# Patient Record
Sex: MALE | Race: BLACK OR AFRICAN AMERICAN
[De-identification: names, ages, dates, MRNs, and addresses within clinical notes are randomized per-mention and may not be internally consistent; named-entity substitution may affect disease eponyms.]

---

## 2018-03-05 ENCOUNTER — HOSPITAL ENCOUNTER (OUTPATIENT)
Dept: HOSPITAL 17 - NEPD | Age: 63
Setting detail: OBSERVATION
LOS: 1 days | Discharge: HOME | End: 2018-03-06
Attending: HOSPITALIST | Admitting: HOSPITALIST
Payer: OTHER GOVERNMENT

## 2018-03-05 VITALS
RESPIRATION RATE: 17 BRPM | SYSTOLIC BLOOD PRESSURE: 102 MMHG | OXYGEN SATURATION: 98 % | DIASTOLIC BLOOD PRESSURE: 82 MMHG | HEART RATE: 74 BPM

## 2018-03-05 VITALS — HEART RATE: 72 BPM | SYSTOLIC BLOOD PRESSURE: 94 MMHG | DIASTOLIC BLOOD PRESSURE: 73 MMHG

## 2018-03-05 VITALS — SYSTOLIC BLOOD PRESSURE: 90 MMHG | DIASTOLIC BLOOD PRESSURE: 61 MMHG

## 2018-03-05 VITALS
SYSTOLIC BLOOD PRESSURE: 215 MMHG | HEART RATE: 92 BPM | DIASTOLIC BLOOD PRESSURE: 110 MMHG | TEMPERATURE: 98.7 F | OXYGEN SATURATION: 96 % | RESPIRATION RATE: 18 BRPM

## 2018-03-05 VITALS
TEMPERATURE: 98.7 F | RESPIRATION RATE: 18 BRPM | SYSTOLIC BLOOD PRESSURE: 101 MMHG | OXYGEN SATURATION: 97 % | HEART RATE: 89 BPM | DIASTOLIC BLOOD PRESSURE: 64 MMHG

## 2018-03-05 VITALS — DIASTOLIC BLOOD PRESSURE: 72 MMHG | HEART RATE: 68 BPM | SYSTOLIC BLOOD PRESSURE: 113 MMHG

## 2018-03-05 VITALS
HEART RATE: 86 BPM | RESPIRATION RATE: 17 BRPM | DIASTOLIC BLOOD PRESSURE: 49 MMHG | SYSTOLIC BLOOD PRESSURE: 78 MMHG | OXYGEN SATURATION: 97 %

## 2018-03-05 VITALS
SYSTOLIC BLOOD PRESSURE: 108 MMHG | HEART RATE: 80 BPM | OXYGEN SATURATION: 99 % | DIASTOLIC BLOOD PRESSURE: 69 MMHG | RESPIRATION RATE: 16 BRPM

## 2018-03-05 DIAGNOSIS — E86.0: ICD-10-CM

## 2018-03-05 DIAGNOSIS — L84: ICD-10-CM

## 2018-03-05 DIAGNOSIS — Z86.73: ICD-10-CM

## 2018-03-05 DIAGNOSIS — E78.00: ICD-10-CM

## 2018-03-05 DIAGNOSIS — M21.612: ICD-10-CM

## 2018-03-05 DIAGNOSIS — B35.1: ICD-10-CM

## 2018-03-05 DIAGNOSIS — N40.0: ICD-10-CM

## 2018-03-05 DIAGNOSIS — E11.9: ICD-10-CM

## 2018-03-05 DIAGNOSIS — I25.10: ICD-10-CM

## 2018-03-05 DIAGNOSIS — Z95.5: ICD-10-CM

## 2018-03-05 DIAGNOSIS — Z23: ICD-10-CM

## 2018-03-05 DIAGNOSIS — I25.2: ICD-10-CM

## 2018-03-05 DIAGNOSIS — M79.671: ICD-10-CM

## 2018-03-05 DIAGNOSIS — M79.672: ICD-10-CM

## 2018-03-05 DIAGNOSIS — F10.10: ICD-10-CM

## 2018-03-05 DIAGNOSIS — I10: ICD-10-CM

## 2018-03-05 DIAGNOSIS — I95.9: Primary | ICD-10-CM

## 2018-03-05 LAB
BASOPHILS # BLD AUTO: 0.1 TH/MM3 (ref 0–0.2)
BASOPHILS NFR BLD: 0.8 % (ref 0–2)
BUN SERPL-MCNC: 24 MG/DL (ref 7–18)
CALCIUM SERPL-MCNC: 8.8 MG/DL (ref 8.5–10.1)
CHLORIDE SERPL-SCNC: 107 MEQ/L (ref 98–107)
CREAT SERPL-MCNC: 1.23 MG/DL (ref 0.6–1.3)
EOSINOPHIL # BLD: 0 TH/MM3 (ref 0–0.4)
EOSINOPHIL NFR BLD: 0.5 % (ref 0–4)
ERYTHROCYTE [DISTWIDTH] IN BLOOD BY AUTOMATED COUNT: 16.2 % (ref 11.6–17.2)
GFR SERPLBLD BASED ON 1.73 SQ M-ARVRAT: 72 ML/MIN (ref 89–?)
GLUCOSE SERPL-MCNC: 86 MG/DL (ref 74–106)
HCO3 BLD-SCNC: 29.2 MEQ/L (ref 21–32)
HCT VFR BLD CALC: 40 % (ref 39–51)
HGB BLD-MCNC: 13.1 GM/DL (ref 13–17)
LYMPHOCYTES # BLD AUTO: 2 TH/MM3 (ref 1–4.8)
LYMPHOCYTES NFR BLD AUTO: 24.1 % (ref 9–44)
MAGNESIUM SERPL-MCNC: 2.4 MG/DL (ref 1.5–2.5)
MCH RBC QN AUTO: 25 PG (ref 27–34)
MCHC RBC AUTO-ENTMCNC: 32.9 % (ref 32–36)
MCV RBC AUTO: 76.2 FL (ref 80–100)
MONOCYTE #: 0.7 TH/MM3 (ref 0–0.9)
MONOCYTES NFR BLD: 8.4 % (ref 0–8)
NEUTROPHILS # BLD AUTO: 5.5 TH/MM3 (ref 1.8–7.7)
NEUTROPHILS NFR BLD AUTO: 66.2 % (ref 16–70)
PLATELET # BLD: 232 TH/MM3 (ref 150–450)
PMV BLD AUTO: 7.8 FL (ref 7–11)
RBC # BLD AUTO: 5.24 MIL/MM3 (ref 4.5–5.9)
SODIUM SERPL-SCNC: 141 MEQ/L (ref 136–145)
WBC # BLD AUTO: 8.3 TH/MM3 (ref 4–11)

## 2018-03-05 PROCEDURE — 73630 X-RAY EXAM OF FOOT: CPT

## 2018-03-05 PROCEDURE — 80053 COMPREHEN METABOLIC PANEL: CPT

## 2018-03-05 PROCEDURE — 90686 IIV4 VACC NO PRSV 0.5 ML IM: CPT

## 2018-03-05 PROCEDURE — 90471 IMMUNIZATION ADMIN: CPT

## 2018-03-05 PROCEDURE — 99285 EMERGENCY DEPT VISIT HI MDM: CPT

## 2018-03-05 PROCEDURE — 96361 HYDRATE IV INFUSION ADD-ON: CPT

## 2018-03-05 PROCEDURE — 97162 PT EVAL MOD COMPLEX 30 MIN: CPT

## 2018-03-05 PROCEDURE — 85025 COMPLETE CBC W/AUTO DIFF WBC: CPT

## 2018-03-05 PROCEDURE — 96360 HYDRATION IV INFUSION INIT: CPT

## 2018-03-05 PROCEDURE — G0008 ADMIN INFLUENZA VIRUS VAC: HCPCS

## 2018-03-05 PROCEDURE — 80048 BASIC METABOLIC PNL TOTAL CA: CPT

## 2018-03-05 PROCEDURE — G0378 HOSPITAL OBSERVATION PER HR: HCPCS

## 2018-03-05 PROCEDURE — 96372 THER/PROPH/DIAG INJ SC/IM: CPT

## 2018-03-05 PROCEDURE — 83735 ASSAY OF MAGNESIUM: CPT

## 2018-03-05 RX ADMIN — PHENYTOIN SODIUM SCH MLS/HR: 50 INJECTION INTRAMUSCULAR; INTRAVENOUS at 20:07

## 2018-03-05 RX ADMIN — HEPARIN SODIUM SCH UNITS: 10000 INJECTION, SOLUTION INTRAVENOUS; SUBCUTANEOUS at 22:42

## 2018-03-05 RX ADMIN — Medication SCH ML: at 20:07

## 2018-03-05 RX ADMIN — STANDARDIZED SENNA CONCENTRATE AND DOCUSATE SODIUM SCH TAB: 8.6; 5 TABLET, FILM COATED ORAL at 20:07

## 2018-03-05 NOTE — PD
HPI


Chief Complaint:  Pain: Acute or Chronic


Time Seen by Provider:  13:04


Travel History


International Travel<30 days:  No


Contact w/Intl Traveler<30days:  No


Traveled to known affect area:  No





History of Present Illness


HPI


62-year-old male presents to the emergency department accompanied by his sister 

with complaint of discoloration in his toes and discoloration of his big 

toenails 5 months.  Also reports foot pain for the last 5 months.  [facility] and [facility] in regards to this complaint.  He 

said he received injections into his feet for his pain which helped.  He tried 

[facility] and was told to come to the ER.  

Denies injury.  Is ambulatory on the affected extremities.  Has a callus on his 

left foot at the joint of the first toe that he says is painful and worse when 

he wears shoes when it rubs up against the shoe.  He denies paresthesias, loss 

of sensation, decreased range of motion, decreased strength to bilateral lower 

extremities.  Denies history of neuropathy or diabetes.  Has not taken any 

medication or tried any treatments to alleviate his symptoms.  Worse with 

ambulation.  Better at rest.  Symptoms are mild in severity.  History of stroke 

4 years ago, cardiac stents, hypertension.  His blood pressure is elevated in 

triage and he denies chest pain, shortness of breath, abdominal pain, vomiting, 

change in vision, diaphoresis, syncope.  Says he has not been taking any of his 

medications for the past 5-6 months because his doctor told him to stop taking 

it.  His primary care [facility].  No known 

allergies.  Has no other medical complaints.  No other modifying factors or 

associated signs and symptoms.





PFSH


Past Medical History


Arthritis:  No


Asthma:  No


Autoimmune Disease:  No


Blood Disorders:  No


Heart Rhythm Problems:  No


Cancer:  No


Cardiac Catheterization:  Yes


Cardiovascular Problems:  Yes


High Cholesterol:  Yes


Chemotherapy:  No


Congestive Heart Failure:  No


COPD:  No


Cerebrovascular Accident:  Yes


Diabetes:  Yes


Diminished Hearing:  No


Endocrine:  No


Gastrointestinal Disorders:  Yes


GERD:  No


Genitourinary:  Yes (BPH)


Hiatal Hernia:  No


Hypertension:  Yes


Immune Disorder:  No


Kidney Stones:  No


Musculoskeletal:  No


Neurologic:  No


Psychiatric:  No


Reproductive:  No


Respiratory:  No


Myocardial Infarction:  Yes


Radiation Therapy:  No


Renal Failure:  No


Sickle Cell Disease:  No


Sleep Apnea:  No


Thyroid Disease:  No


Ulcer:  No





Past Surgical History


Abdominal Surgery:  No


AICD:  No


Cardiac Surgery:  Yes (angioplasty/stent)


Coronary Stent:  Yes


Ear Surgery:  No


Endocrine Surgery:  No


Eye Surgery:  No


Genitourinary Surgery:  No


Gynecologic Surgery:  No


Insulin Pump:  No


Joint Replacement:  No


Oral Surgery:  No


Pacemaker:  No


Thoracic Surgery:  No


Other Surgery:  Yes





Social History


Alcohol Use:  No


Tobacco Use:  No (QUIT OCTOBER 2013)


Substance Use:  No





Allergies-Medications


(Allergen,Severity, Reaction):  


Coded Allergies:  


     No Known Allergies (Verified  Allergy, Unknown, 3/5/18)


Reported Meds & Prescriptions





Reported Meds & Active Scripts


Active








Review of Systems


Except as stated in HPI:  all other systems reviewed are Neg





Physical Exam


Narrative


GENERAL: Thin, elderly, black male patient, in no acute distress


SKIN: Warm and dry.


HEAD: Atraumatic. Normocephalic. 


EYES: Pupils equal and round. No scleral icterus. No injection or drainage.


ENT: Mucosa pink and moist.  Airway patent.  


NECK: Trachea midline.  


CARDIOVASCULAR: Regular rate and rhythm.  No murmur appreciated. 


RESPIRATORY: No accessory muscle use. Clear to auscultation. Breath sounds 

equal bilaterally. 


GASTROINTESTINAL: Abdomen soft, non-tender, nondistended. Hepatic and splenic 

margins not palpable.  Bowel sounds are active 4 quadrants.  


MUSCULOSKELETAL: Bilateral feet are nonedematous, nonerythematous and without 

ecchymosis; without tenderness on palpation; toes are warm and was sensory 

intact; no cyanosis; 2+ pedal pulses; onychomycosis noted to bilateral great 

toenails. No obvious deformities. No clubbing.  No cyanosis.  No edema. 


NEUROLOGICAL: Awake and alert.  Oriented 3.  No obvious cranial nerve 

deficits.  Motor grossly within normal limits. Normal speech. 


PSYCHIATRIC: Appropriate mood and affect; insight and judgment normal.





Data


Data


Last Documented VS





Vital Signs








  Date Time  Temp Pulse Resp B/P (MAP) Pulse Ox O2 Delivery O2 Flow Rate FiO2


 


3/5/18 18:00  68  113/72 (86)    


 


3/5/18 17:00   17  97 Room Air  


 


3/5/18 12:19 98.7       








Orders





 Orders


Basic Metabolic Panel (Bmp) (3/5/18 13:47)


Complete Blood Count With Diff (3/5/18 13:47)


Iv Access Insert/Monitor (3/5/18 13:47)


Sodium Chloride 0.9% Flush (Ns Flush) (3/5/18 14:00)


Orthostatic Vital Signs (3/5/18 13:47)


Magnesium (Mg) (3/5/18 13:47)


Sodium Chlor 0.9% 1000 Ml Inj (Ns 1000 M (3/5/18 16:30)


Admit Order (Ed Use Only) (3/5/18 17:58)


Consult Podiatry (3/5/18 )





Labs





Laboratory Tests








Test


  3/5/18


14:45


 


White Blood Count 8.3 TH/MM3 


 


Red Blood Count 5.24 MIL/MM3 


 


Hemoglobin 13.1 GM/DL 


 


Hematocrit 40.0 % 


 


Mean Corpuscular Volume 76.2 FL 


 


Mean Corpuscular Hemoglobin 25.0 PG 


 


Mean Corpuscular Hemoglobin


Concent 32.9 % 


 


 


Red Cell Distribution Width 16.2 % 


 


Platelet Count 232 TH/MM3 


 


Mean Platelet Volume 7.8 FL 


 


Neutrophils (%) (Auto) 66.2 % 


 


Lymphocytes (%) (Auto) 24.1 % 


 


Monocytes (%) (Auto) 8.4 % 


 


Eosinophils (%) (Auto) 0.5 % 


 


Basophils (%) (Auto) 0.8 % 


 


Neutrophils # (Auto) 5.5 TH/MM3 


 


Lymphocytes # (Auto) 2.0 TH/MM3 


 


Monocytes # (Auto) 0.7 TH/MM3 


 


Eosinophils # (Auto) 0.0 TH/MM3 


 


Basophils # (Auto) 0.1 TH/MM3 


 


CBC Comment DIFF FINAL 


 


Differential Comment  


 


Blood Urea Nitrogen 24 MG/DL 


 


Creatinine 1.23 MG/DL 


 


Random Glucose 86 MG/DL 


 


Calcium Level 8.8 MG/DL 


 


Magnesium Level 2.4 MG/DL 


 


Sodium Level 141 MEQ/L 


 


Potassium Level 4.0 MEQ/L 


 


Chloride Level 107 MEQ/L 


 


Carbon Dioxide Level 29.2 MEQ/L 


 


Anion Gap 5 MEQ/L 


 


Estimat Glomerular Filtration


Rate 72 ML/MIN 


 











[facility]


Medical Decision Making


Medical Screen Exam Complete:  Yes


Emergency Medical Condition:  Yes


Medical Record Reviewed:  Yes


Differential Diagnosis


neuropathy, plantar fasciitis, callus, bunion, onychomycosis, orthostatic 

hypotension


Narrative Course


62-year-old male with bilateral foot pain, onychomycosis, and a callus to the 

left foot.  His blood pressure is elevated in triage and he is asymptomatic.  

On blood pressure recheck his blood pressure readings were right upper 

extremity 82/52; left upper extremity 71/43.  He reports history of 

hypertension and says he has not taken any of his medications for the past 5-6 

months.  He denies lightheadedness, dizziness, syncope.  I discussed the 

patient with Dr. Enriquez and he recommended CBC, BMP, orthostatic vital 

signs.  With orthostatic vital signs the patient blood pressure remains low and 

when he stands up reports dizziness.  This was discussed with Dr. Enriquez and 

he recommended a fluid bolus and recheck orthostatic vital signs after bolus 

and if continues to feel dizzy with standing up then to admit the patient for 

observation.  Normal saline bolus ordered.


1614: CBC unremarkable.  BMP unremarkable.  BUN 24.  Magnesium 2.4.


1757: I spoke with KINGSTON Rinaldi and the patient will be admitted for 23 

observation for hypotension.  Report given.  Consult podiatry ordered.





Physician Communication


Physician Communication


KINGSTON Rinaldi





Diagnosis





 Primary Impression:  


 Hypotension


 Qualified Codes:  I95.9 - Hypotension, unspecified


 Additional Impressions:  


 Foot pain, bilateral


 Callus of foot


 Onychomycosis of left great toe


 Onychomycosis of right great toe





Admitting Information


Admitting Physician Requests:  Observation


Referrals:  


Podiatrist











Mable Reno Mar 5, 2018 13:06

## 2018-03-05 NOTE — HHI.HP
__________________________________________________





[facility]


Service


[facility]


Primary Care Physician


[facility]


Admission Diagnosis





Hypotension, bilateral foot pain


Diagnoses:  


Travel History


International Travel<30 Days:  No


Contact w/Intl Traveler <30 Da:  No


Traveled to Known Affected Are:  No


History of Present Illness


62-year-old male with a past medical history of previous CVA, CAD, history of 

hypertension presents to the emergency department for evaluation of bilateral 

foot pain.  The patient reports that he has pain on the medial side of his left 

foot, worse near his toe and on the fifth digit of his right foot.  He states 

the pain has gotten so great that he came to the emergency department for 

further evaluation.  Upon arrival to the emergency department he was found to 

be hypertensive in triage.  The patient was previously treated for hypertension 

however he reports he has not taken any antihypertensives and 4-5 months.  In 

the emergency department he was found to be hypotensive with a blood pressure 

of 78/49.  Multiple repeated measures were in the 80s systolic.  The patient 

reports dizziness upon standing.  He denies any chest pain, shortness of breath

, nausea/vomiting, lateralizing signs/symptoms.  He reports some residual hand 

 weakness in his left hand.  No fever/chills.





Review of Systems


Except as stated in HPI:  all other systems reviewed are Neg





Past Family Social History


Past Medical History


CVA in 2014


Coronary artery disease 


History of hypertension


Past Surgical History


Stent placement in 


Reported Medications





Reported Meds & Active Scripts


Active


Allergies:  


Coded Allergies:  


     No Known Allergies (Verified  Allergy, Unknown, 3/5/18)


Family History


Mother with diabetes mellitus


Social History


Sox proximally 1.5 packs per day.  Drinks a 4 pack of 16 ounce beers daily.  

Denies illicit drugs.





Physical Exam


Vital Signs





Vital Signs








  Date Time  Temp Pulse Resp B/P (MAP) Pulse Ox O2 Delivery O2 Flow Rate FiO2


 


3/5/18 19:41  80 16 108/69 (82) 99   


 


3/5/18 18:00  68  113/72 (86)    


 


3/5/18 17:30  72  94/73 (80)    


 


3/5/18 17:00  86 17 78/49 (59) 97 Room Air  


 


3/5/18 16:00  74 17 102/82 (89) 98 Room Air  


 


3/5/18 14:45  86  90/65 (73)    





  82  88/61 (70)    





  103  80/62 (68)    


 


3/5/18 12:19 98.7 92 18 215/110 (145) 96   








Physical Exam


GENERAL:  male lying in bed


SKIN: No rashes, ecchymoses or lesions. Cool and dry.


HEAD: Atraumatic. Normocephalic. No temporal or scalp tenderness.


EYES: Pupils equal round and reactive. Extraocular motions intact. No scleral 

icterus. No injection or drainage. 


ENT: Nose without bleeding, purulent drainage or septal hematoma. Throat 

without erythema, tonsillar hypertrophy or exudate. Uvula midline. Airway 

patent.


NECK: Trachea midline. No JVD or lymphadenopathy. Supple, nontender, no 

meningeal signs.


CARDIOVASCULAR: Regular rate and rhythm without murmurs, gallops, or rubs. 


RESPIRATORY: Clear to auscultation. Breath sounds equal bilaterally. No wheezes

, rales, or rhonchi.  


GASTROINTESTINAL: Abdomen soft, non-tender, nondistended. No hepato-splenomegaly

, or palpable masses. No guarding.


MUSCULOSKELETAL: Extremities without clubbing, cyanosis, or edema. No joint 

tenderness, effusion, or edema noted. No calf tenderness. Negative Homans sign 

bilaterally.


NEUROLOGICAL: Awake and alert. Cranial nerves II through XII intact.  Motor and 

sensory grossly within normal limits. Five out of 5 muscle strength in all 

muscle groups.  Slurred speech, at baseline.


Laboratory





Laboratory Tests








Test


  3/5/18


14:45


 


White Blood Count 8.3 


 


Red Blood Count 5.24 


 


Hemoglobin 13.1 


 


Hematocrit 40.0 


 


Mean Corpuscular Volume 76.2 


 


Mean Corpuscular Hemoglobin 25.0 


 


Mean Corpuscular Hemoglobin


Concent 32.9 


 


 


Red Cell Distribution Width 16.2 


 


Platelet Count 232 


 


Mean Platelet Volume 7.8 


 


Neutrophils (%) (Auto) 66.2 


 


Lymphocytes (%) (Auto) 24.1 


 


Monocytes (%) (Auto) 8.4 


 


Eosinophils (%) (Auto) 0.5 


 


Basophils (%) (Auto) 0.8 


 


Neutrophils # (Auto) 5.5 


 


Lymphocytes # (Auto) 2.0 


 


Monocytes # (Auto) 0.7 


 


Eosinophils # (Auto) 0.0 


 


Basophils # (Auto) 0.1 


 


CBC Comment DIFF FINAL 


 


Differential Comment  


 


Blood Urea Nitrogen 24 


 


Creatinine 1.23 


 


Random Glucose 86 


 


Calcium Level 8.8 


 


Magnesium Level 2.4 


 


Sodium Level 141 


 


Potassium Level 4.0 


 


Chloride Level 107 


 


Carbon Dioxide Level 29.2 


 


Anion Gap 5 


 


Estimat Glomerular Filtration


Rate 72 


 








Result Diagram:  


3/5/18 1445                                                                    

            3/5/18 1445








Caprini VTE Risk Assessment


Caprini VTE Risk Assessment:  Mod/High Risk (score >= 2)


Caprini Risk Assessment Model











 Point Value = 1          Point Value = 2  Point Value = 3  Point Value = 5


 


Age 41-60


Minor surgery


BMI > 25 kg/m2


Swollen legs


Varicose veins


Pregnancy or postpartum


History of unexplained or recurrent


   spontaneous 


Oral contraceptives or hormone


   replacement


Sepsis (< 1 month)


Serious lung disease, including


   pneumonia (< 1 month)


Abnormal pulmonary function


Acute myocardial infarction


Congestive heart failure (< 1 month)


History of inflammatory bowel disease


Medical patient at bed rest Age 61-74


Arthroscopic surgery


Major open surgery (> 45 min)


Laparoscopic surgery (> 45 min)


Malignancy


Confined to bed (> 72 hours)


Immobilizing plaster cast


Central venous access Age >= 75


History of VTE


Family history of VTE


Factor V Leiden


Prothrombin 10401O


Lupus anticoagulant


Anticardiolipin antibodies


Elevated serum homocysteine


Heparin-induced thrombocytopenia


Other congenital or acquired


   thrombophilia Stroke (< 1 month)


Elective arthroplasty


Hip, pelvis, or leg fracture


Acute spinal cord injury (< 1 month)








Prophylaxis Regimen











   Total Risk


Factor Score Risk Level Prophylaxis Regimen


 


0-1      Low Early ambulation


 


2 Moderate Order ONE of the following:


*Sequential Compression Device (SCD)


*Heparin 5000 units SQ BID


 


3-4 Higher Order ONE of the following medications:


*Heparin 5000 units SQ TID


*Enoxaparin/Lovenox 40 mg SQ daily (WT < 150 kg, CrCl > 30 mL/min)


*Enoxaparin/Lovenox 30 mg SQ daily (WT < 150 kg, CrCl > 10-29 mL/min)


*Enoxaparin/Lovenox 30 mg SQ BID (WT < 150 kg, CrCl > 30 mL/min)


AND/OR


*Sequential Compression Device (SCD)


 


5 or more Highest Order ONE of the following medications:


*Heparin 5000 units SQ TID (Preferred with Epidurals)


*Enoxaparin/Lovenox 40 mg SQ daily (WT < 150 kg, CrCl > 30 mL/min)


*Enoxaparin/Lovenox 30 mg SQ daily (WT < 150 kg, CrCl > 10-29 mL/min)


*Enoxaparin/Lovenox 30 mg SQ BID (WT < 150 kg, CrCl > 30 mL/min)


AND


*Sequential Compression Device (SCD)











Assessment and Plan


Assessment and Plan


Assessment/plan:





1.  Symptomatic hypotension


Unclear etiology - may be secondary to alcohol abuse and dehydration


Orthostatics pending


PT consulted, appreciate recommendations


IV fluids





2.  Bilateral foot pain


X-rays pending


Podiatry consulted, appreciate recommendations





3.  CAD


Patient will follow-up with his PCP





4.  Alcohol abuse


Cessation counseling provided


Thiamine/folate/multivitamin


CIWA protocol





FEN


NS at 100 cc/hr


Heart healthy diet


Electrolytes: monitor and replete prn


Heparin











Ashley Chow MD Mar 5, 2018 21:51

## 2018-03-05 NOTE — RADRPT
EXAM DATE/TIME:  03/05/2018 21:57 

 

[facility] COMPARISON:     

No previous studies available for comparison.

 

                     

INDICATIONS :     

Right foot pain with no injury for 2 week.

                     

 

MEDICAL HISTORY :     

Hypertension.  Hypercholesterolemia.  Myocardial infarction.   CVA.   

 

SURGICAL HISTORY :     

Coronary artery stent.   

 

ENCOUNTER:     

Initial                                        

 

ACUITY:     

2 weeks      

 

PAIN SCORE:     

4/10

 

LOCATION:     

Right  foot.

 

FINDINGS:     

Three view examination of the right foot demonstrates no soft tissue swelling, dislocation, or fractu
re.   The tarsal bones appear intact.  The interphalangeal and metatarsophalangeal joints are intact.
  The calcaneus is intact.  Bony mineralization is mildly decreased.

 

CONCLUSION:     Negative exam.

 

 

 

 Santiago Vasquez MD on March 05, 2018 at 22:39           

Board Certified Radiologist.

 This report was verified electronically.

## 2018-03-06 VITALS
DIASTOLIC BLOOD PRESSURE: 73 MMHG | TEMPERATURE: 98.3 F | OXYGEN SATURATION: 98 % | RESPIRATION RATE: 20 BRPM | HEART RATE: 81 BPM | SYSTOLIC BLOOD PRESSURE: 114 MMHG

## 2018-03-06 VITALS
HEART RATE: 88 BPM | DIASTOLIC BLOOD PRESSURE: 86 MMHG | SYSTOLIC BLOOD PRESSURE: 110 MMHG | RESPIRATION RATE: 18 BRPM | OXYGEN SATURATION: 99 % | TEMPERATURE: 98.5 F

## 2018-03-06 VITALS
OXYGEN SATURATION: 99 % | HEART RATE: 74 BPM | DIASTOLIC BLOOD PRESSURE: 58 MMHG | TEMPERATURE: 98.7 F | SYSTOLIC BLOOD PRESSURE: 106 MMHG | RESPIRATION RATE: 20 BRPM

## 2018-03-06 LAB
ALBUMIN SERPL-MCNC: 3.2 GM/DL (ref 3.4–5)
ALP SERPL-CCNC: 96 U/L (ref 45–117)
ALT SERPL-CCNC: 13 U/L (ref 12–78)
AST SERPL-CCNC: 10 U/L (ref 15–37)
BASOPHILS # BLD AUTO: 0.1 TH/MM3 (ref 0–0.2)
BASOPHILS NFR BLD: 1.4 % (ref 0–2)
BILIRUB SERPL-MCNC: 0.3 MG/DL (ref 0.2–1)
BUN SERPL-MCNC: 18 MG/DL (ref 7–18)
CALCIUM SERPL-MCNC: 8.4 MG/DL (ref 8.5–10.1)
CHLORIDE SERPL-SCNC: 112 MEQ/L (ref 98–107)
CREAT SERPL-MCNC: 1.03 MG/DL (ref 0.6–1.3)
EOSINOPHIL # BLD: 0.1 TH/MM3 (ref 0–0.4)
EOSINOPHIL NFR BLD: 0.9 % (ref 0–4)
ERYTHROCYTE [DISTWIDTH] IN BLOOD BY AUTOMATED COUNT: 15.8 % (ref 11.6–17.2)
GFR SERPLBLD BASED ON 1.73 SQ M-ARVRAT: 88 ML/MIN (ref 89–?)
GLUCOSE SERPL-MCNC: 84 MG/DL (ref 74–106)
HCO3 BLD-SCNC: 26.4 MEQ/L (ref 21–32)
HCT VFR BLD CALC: 36.1 % (ref 39–51)
HGB BLD-MCNC: 11.8 GM/DL (ref 13–17)
LYMPHOCYTES # BLD AUTO: 2.6 TH/MM3 (ref 1–4.8)
LYMPHOCYTES NFR BLD AUTO: 35.1 % (ref 9–44)
MCH RBC QN AUTO: 24.6 PG (ref 27–34)
MCHC RBC AUTO-ENTMCNC: 32.7 % (ref 32–36)
MCV RBC AUTO: 75.2 FL (ref 80–100)
MONOCYTE #: 0.7 TH/MM3 (ref 0–0.9)
MONOCYTES NFR BLD: 9.5 % (ref 0–8)
NEUTROPHILS # BLD AUTO: 3.9 TH/MM3 (ref 1.8–7.7)
NEUTROPHILS NFR BLD AUTO: 53.1 % (ref 16–70)
PLATELET # BLD: 193 TH/MM3 (ref 150–450)
PMV BLD AUTO: 7.3 FL (ref 7–11)
PROT SERPL-MCNC: 6.5 GM/DL (ref 6.4–8.2)
RBC # BLD AUTO: 4.8 MIL/MM3 (ref 4.5–5.9)
SODIUM SERPL-SCNC: 143 MEQ/L (ref 136–145)
WBC # BLD AUTO: 7.3 TH/MM3 (ref 4–11)

## 2018-03-06 RX ADMIN — Medication SCH ML: at 09:22

## 2018-03-06 RX ADMIN — PHENYTOIN SODIUM SCH MLS/HR: 50 INJECTION INTRAMUSCULAR; INTRAVENOUS at 05:06

## 2018-03-06 RX ADMIN — STANDARDIZED SENNA CONCENTRATE AND DOCUSATE SODIUM SCH TAB: 8.6; 5 TABLET, FILM COATED ORAL at 09:21

## 2018-03-06 RX ADMIN — HEPARIN SODIUM SCH UNITS: 10000 INJECTION, SOLUTION INTRAVENOUS; SUBCUTANEOUS at 05:08

## 2018-03-06 NOTE — HHI.PR
Subjective


Remarks


Follow-up foot pain and hypotension.  Patient has chronic bilateral foot pain 

from left bunion and right fifth toe. Dw RN





Objective


Vitals





Vital Signs








  Date Time  Temp Pulse Resp B/P (MAP) Pulse Ox O2 Delivery O2 Flow Rate FiO2


 


3/6/18 12:08 98.7 74 20 106/58 (74) 99   


 


3/6/18 07:25 98.3 81 20 114/73 (87) 98   


 


3/6/18 03:21 98.5 88 18 110/86 (94) 99   


 


3/5/18 23:22 98.7 89 18 101/64 (76) 97   


 


3/5/18 19:41  80 16 108/69 (82) 99   


 


3/5/18 18:00  68  113/72 (86)    


 


3/5/18 17:30  72  94/73 (80)    


 


3/5/18 17:00  86 17 78/49 (59) 97 Room Air  


 


3/5/18 16:00  74 17 102/82 (89) 98 Room Air  


 


3/5/18 14:45  86  90/65 (73)    





  82  88/61 (70)    





  103  80/62 (68)    








Result Diagram:  


3/6/18 0351                                                                    

            3/6/18 0351





Imaging





Last Impressions








Foot X-Ray 3/5/18 0000 Signed





Impressions: 





 Service Date/Time:  Monday, March 5, 2018 21:57 - CONCLUSION: Negative exam.  

   





 Santiago Vasquez MD 








Objective Remarks


GENERAL:  male lying in bed


SKIN: No rashes, ecchymoses or lesions. Cool and dry.


CARDIOVASCULAR: Regular rate and rhythm without murmurs, gallops, or rubs. 


RESPIRATORY: Clear to auscultation. Breath sounds equal bilaterally. No wheezes

, rales, or rhonchi.  


GASTROINTESTINAL: Abdomen soft, non-tender, nondistended. No guarding.


MUSCULOSKELETAL: Extremities without clubbing, cyanosis, or edema. No joint 

tenderness, effusion, or edema noted. No calf tenderness. Negative Homans sign 

bilaterally.  Bunion left foot and onychomycosis right fifth toenail which is 

digging into the skin no signs of cellulitis


NEUROLOGICAL: Awake and alert. Cranial nerves II through XII intact.  Motor and 

sensory grossly within normal limits. Five out of 5 muscle strength in all 

muscle groups.  Slurred speech, at baseline.


Procedures


none





A/P


Problem List:  


(1) Hypotension


ICD Code:  I95.9 - Hypotension


Status:  Acute


Assessment and Plan


1.  Symptomatic hypotension from dehydration secondary to intermittent loose 

stools for 1 month.  This is improved after IV hydration.  Obtain stool 

studies.  PT has cleared patient for discharge


2.  Bilateral foot pain.  X-rays negative for bony injuries.  He has bunion on 

the left and onychomycosis involving the right fifth small toe.  Will cancel 

podiatry consult patient can be seen outpatient


3.  CAD.  Stable


4.  Alcohol abuse.  Counseled


Discharge Planning


Discharge patient to home if stool studies negative and no diarrhea for 24 hours

(none overnight)


Condition on discharge: Improved


Regular Diet as tolerated


Ad Alis activity


Rx written: None


Follow-up with primary care physician and podiatry





Problem Qualifiers





(1) Hypotension:  


Qualified Codes:  I95.9 - Hypotension, unspecified








Naman Ku MD Mar 6, 2018 13:47

## 2018-03-06 NOTE — HHI.DCPOC
Discharge Care Plan


Diagnosis:  


(1) Foot pain, bilateral


(2) Hypotension


Goals to Promote Your Health


* To prevent worsening of your condition and complications


* To maintain your health at the optimal level


Directions to Meet Your Goals


*** Take your medications as prescribed


*** Follow your dietary instruction


*** Follow activity as directed








*** Keep your appointments as scheduled


*** Take your immunizations and boosters as scheduled


*** If your symptoms worsen call your PCP, if no PCP go to Urgent Care Center 

or Emergency Room***


*** Smoking is Dangerous to Your Health. Avoid second hand smoke***


***Call the 24-hour hour crisis hotline for domestic abuse at 1-572.922.8577***











Suad Mary PA-C Mar 6, 2018 09:00

## 2018-05-18 ENCOUNTER — HOSPITAL ENCOUNTER (INPATIENT)
Dept: HOSPITAL 17 - NEPD | Age: 63
LOS: 22 days | Discharge: NURSING, REHAB OR LONG-TERM CARE | DRG: 854 | End: 2018-06-09
Attending: HOSPITALIST | Admitting: HOSPITALIST
Payer: OTHER GOVERNMENT

## 2018-05-18 VITALS
OXYGEN SATURATION: 99 % | SYSTOLIC BLOOD PRESSURE: 100 MMHG | DIASTOLIC BLOOD PRESSURE: 54 MMHG | HEART RATE: 98 BPM | TEMPERATURE: 98.6 F | RESPIRATION RATE: 18 BRPM

## 2018-05-18 VITALS — WEIGHT: 128.53 LBS | HEIGHT: 65 IN | BODY MASS INDEX: 21.41 KG/M2

## 2018-05-18 DIAGNOSIS — L97.529: ICD-10-CM

## 2018-05-18 DIAGNOSIS — Z95.5: ICD-10-CM

## 2018-05-18 DIAGNOSIS — I25.2: ICD-10-CM

## 2018-05-18 DIAGNOSIS — B96.5: ICD-10-CM

## 2018-05-18 DIAGNOSIS — R39.12: ICD-10-CM

## 2018-05-18 DIAGNOSIS — R31.0: ICD-10-CM

## 2018-05-18 DIAGNOSIS — E11.51: ICD-10-CM

## 2018-05-18 DIAGNOSIS — I70.202: ICD-10-CM

## 2018-05-18 DIAGNOSIS — I74.5: ICD-10-CM

## 2018-05-18 DIAGNOSIS — R47.81: ICD-10-CM

## 2018-05-18 DIAGNOSIS — I95.9: ICD-10-CM

## 2018-05-18 DIAGNOSIS — I70.291: ICD-10-CM

## 2018-05-18 DIAGNOSIS — L03.116: ICD-10-CM

## 2018-05-18 DIAGNOSIS — E11.69: ICD-10-CM

## 2018-05-18 DIAGNOSIS — E11.621: ICD-10-CM

## 2018-05-18 DIAGNOSIS — M86.60: ICD-10-CM

## 2018-05-18 DIAGNOSIS — I10: ICD-10-CM

## 2018-05-18 DIAGNOSIS — M10.9: ICD-10-CM

## 2018-05-18 DIAGNOSIS — N40.1: ICD-10-CM

## 2018-05-18 DIAGNOSIS — N32.0: ICD-10-CM

## 2018-05-18 DIAGNOSIS — E78.5: ICD-10-CM

## 2018-05-18 DIAGNOSIS — I25.10: ICD-10-CM

## 2018-05-18 DIAGNOSIS — F17.210: ICD-10-CM

## 2018-05-18 DIAGNOSIS — I69.354: ICD-10-CM

## 2018-05-18 DIAGNOSIS — A41.01: Primary | ICD-10-CM

## 2018-05-18 DIAGNOSIS — N39.0: ICD-10-CM

## 2018-05-18 DIAGNOSIS — J44.9: ICD-10-CM

## 2018-05-18 LAB
ALBUMIN SERPL-MCNC: 4.4 GM/DL (ref 3.4–5)
ALP SERPL-CCNC: 80 U/L (ref 45–117)
ALT SERPL-CCNC: 19 U/L (ref 12–78)
AST SERPL-CCNC: 32 U/L (ref 15–37)
BASOPHILS # BLD AUTO: 0.1 TH/MM3 (ref 0–0.2)
BASOPHILS NFR BLD: 0.8 % (ref 0–2)
BILIRUB SERPL-MCNC: 1 MG/DL (ref 0.2–1)
BUN SERPL-MCNC: 14 MG/DL (ref 7–18)
CALCIUM SERPL-MCNC: 9.2 MG/DL (ref 8.5–10.1)
CHLORIDE SERPL-SCNC: 105 MEQ/L (ref 98–107)
CREAT SERPL-MCNC: 1.18 MG/DL (ref 0.6–1.3)
CRP SERPL-MCNC: 2 MG/DL (ref 0–0.3)
EOSINOPHIL # BLD: 0 TH/MM3 (ref 0–0.4)
EOSINOPHIL NFR BLD: 0.2 % (ref 0–4)
ERYTHROCYTE [DISTWIDTH] IN BLOOD BY AUTOMATED COUNT: 16.5 % (ref 11.6–17.2)
GFR SERPLBLD BASED ON 1.73 SQ M-ARVRAT: 76 ML/MIN (ref 89–?)
GLUCOSE SERPL-MCNC: 80 MG/DL (ref 74–106)
HCO3 BLD-SCNC: 27.2 MEQ/L (ref 21–32)
HCT VFR BLD CALC: 42.2 % (ref 39–51)
HGB BLD-MCNC: 13.5 GM/DL (ref 13–17)
INR PPP: 1.1 RATIO
LYMPHOCYTES # BLD AUTO: 2.4 TH/MM3 (ref 1–4.8)
LYMPHOCYTES NFR BLD AUTO: 16.6 % (ref 9–44)
MCH RBC QN AUTO: 23.8 PG (ref 27–34)
MCHC RBC AUTO-ENTMCNC: 32.1 % (ref 32–36)
MCV RBC AUTO: 74.1 FL (ref 80–100)
MONOCYTE #: 1.7 TH/MM3 (ref 0–0.9)
MONOCYTES NFR BLD: 11.8 % (ref 0–8)
NEUTROPHILS # BLD AUTO: 10.3 TH/MM3 (ref 1.8–7.7)
NEUTROPHILS NFR BLD AUTO: 70.6 % (ref 16–70)
PLATELET # BLD: 204 TH/MM3 (ref 150–450)
PMV BLD AUTO: 8.4 FL (ref 7–11)
PROT SERPL-MCNC: 8 GM/DL (ref 6.4–8.2)
PROTHROMBIN TIME: 10.9 SEC (ref 9.8–11.6)
RBC # BLD AUTO: 5.7 MIL/MM3 (ref 4.5–5.9)
SODIUM SERPL-SCNC: 143 MEQ/L (ref 136–145)
WBC # BLD AUTO: 14.5 TH/MM3 (ref 4–11)

## 2018-05-18 PROCEDURE — 87493 C DIFF AMPLIFIED PROBE: CPT

## 2018-05-18 PROCEDURE — 93923 UPR/LXTR ART STDY 3+ LVLS: CPT

## 2018-05-18 PROCEDURE — 86920 COMPATIBILITY TEST SPIN: CPT

## 2018-05-18 PROCEDURE — 88307 TISSUE EXAM BY PATHOLOGIST: CPT

## 2018-05-18 PROCEDURE — 86901 BLOOD TYPING SEROLOGIC RH(D): CPT

## 2018-05-18 PROCEDURE — A9579 GAD-BASE MR CONTRAST NOS,1ML: HCPCS

## 2018-05-18 PROCEDURE — 83036 HEMOGLOBIN GLYCOSYLATED A1C: CPT

## 2018-05-18 PROCEDURE — 86403 PARTICLE AGGLUT ANTBDY SCRN: CPT

## 2018-05-18 PROCEDURE — 87077 CULTURE AEROBIC IDENTIFY: CPT

## 2018-05-18 PROCEDURE — 87641 MR-STAPH DNA AMP PROBE: CPT

## 2018-05-18 PROCEDURE — 87206 SMEAR FLUORESCENT/ACID STAI: CPT

## 2018-05-18 PROCEDURE — 88305 TISSUE EXAM BY PATHOLOGIST: CPT

## 2018-05-18 PROCEDURE — 86430 RHEUMATOID FACTOR TEST QUAL: CPT

## 2018-05-18 PROCEDURE — 80053 COMPREHEN METABOLIC PANEL: CPT

## 2018-05-18 PROCEDURE — 87015 SPECIMEN INFECT AGNT CONCNTJ: CPT

## 2018-05-18 PROCEDURE — 86140 C-REACTIVE PROTEIN: CPT

## 2018-05-18 PROCEDURE — P9045 ALBUMIN (HUMAN), 5%, 250 ML: HCPCS

## 2018-05-18 PROCEDURE — 85027 COMPLETE CBC AUTOMATED: CPT

## 2018-05-18 PROCEDURE — 87040 BLOOD CULTURE FOR BACTERIA: CPT

## 2018-05-18 PROCEDURE — 85025 COMPLETE CBC W/AUTO DIFF WBC: CPT

## 2018-05-18 PROCEDURE — 87070 CULTURE OTHR SPECIMN AEROBIC: CPT

## 2018-05-18 PROCEDURE — L3260 AMBULATORY SURGICAL BOOT EAC: HCPCS

## 2018-05-18 PROCEDURE — 36569 INSJ PICC 5 YR+ W/O IMAGING: CPT

## 2018-05-18 PROCEDURE — 85610 PROTHROMBIN TIME: CPT

## 2018-05-18 PROCEDURE — 87116 MYCOBACTERIA CULTURE: CPT

## 2018-05-18 PROCEDURE — 86900 BLOOD TYPING SEROLOGIC ABO: CPT

## 2018-05-18 PROCEDURE — 73630 X-RAY EXAM OF FOOT: CPT

## 2018-05-18 PROCEDURE — 86038 ANTINUCLEAR ANTIBODIES: CPT

## 2018-05-18 PROCEDURE — 80048 BASIC METABOLIC PNL TOTAL CA: CPT

## 2018-05-18 PROCEDURE — 76937 US GUIDE VASCULAR ACCESS: CPT

## 2018-05-18 PROCEDURE — 87086 URINE CULTURE/COLONY COUNT: CPT

## 2018-05-18 PROCEDURE — 81001 URINALYSIS AUTO W/SCOPE: CPT

## 2018-05-18 PROCEDURE — 84439 ASSAY OF FREE THYROXINE: CPT

## 2018-05-18 PROCEDURE — 75635 CT ANGIO ABDOMINAL ARTERIES: CPT

## 2018-05-18 PROCEDURE — 84550 ASSAY OF BLOOD/URIC ACID: CPT

## 2018-05-18 PROCEDURE — 93005 ELECTROCARDIOGRAM TRACING: CPT

## 2018-05-18 PROCEDURE — 83735 ASSAY OF MAGNESIUM: CPT

## 2018-05-18 PROCEDURE — 88304 TISSUE EXAM BY PATHOLOGIST: CPT

## 2018-05-18 PROCEDURE — 86850 RBC ANTIBODY SCREEN: CPT

## 2018-05-18 PROCEDURE — 87102 FUNGUS ISOLATION CULTURE: CPT

## 2018-05-18 PROCEDURE — 73720 MRI LWR EXTREMITY W/O&W/DYE: CPT

## 2018-05-18 PROCEDURE — 93306 TTE W/DOPPLER COMPLETE: CPT

## 2018-05-18 PROCEDURE — 85730 THROMBOPLASTIN TIME PARTIAL: CPT

## 2018-05-18 PROCEDURE — 83605 ASSAY OF LACTIC ACID: CPT

## 2018-05-18 PROCEDURE — 84443 ASSAY THYROID STIM HORMONE: CPT

## 2018-05-18 PROCEDURE — 85652 RBC SED RATE AUTOMATED: CPT

## 2018-05-18 PROCEDURE — 84100 ASSAY OF PHOSPHORUS: CPT

## 2018-05-18 PROCEDURE — 87186 SC STD MICRODIL/AGAR DIL: CPT

## 2018-05-18 PROCEDURE — 87147 CULTURE TYPE IMMUNOLOGIC: CPT

## 2018-05-18 PROCEDURE — 88311 DECALCIFY TISSUE: CPT

## 2018-05-18 PROCEDURE — 87205 SMEAR GRAM STAIN: CPT

## 2018-05-18 NOTE — PD
HPI


Chief Complaint:  Skin Problem


Time Seen by Provider:  21:20


Travel History


International Travel<30 days:  No


Contact w/Intl Traveler<30days:  No


Traveled to known affect area:  No





History of Present Illness


HPI


62-year-old male with history of CVA with left-sided weakness, here with his 

home health caretaker for evaluation of pain and swelling to left foot with 

purulent drainage.  The patient has had pain to the base of his left great toe 

for over a month now.  Pain significantly worsened since yesterday and today 

there is purulent drainage at the site.  [facility] 

to present to [facility] emergency department for evaluation.  Patient rates the pain 

is 15 out of 10, constant, sharp/pressure, worse with movements and palpation.  

He denies fevers or chills.  No known trauma.  He smokes about half pack of 

cigarettes daily.  He was on antihypertensives, however no longer takes any 

medications.





PFSH


Past Medical History


Arthritis:  No


Asthma:  No


Autoimmune Disease:  No


Blood Disorders:  No


Heart Rhythm Problems:  No


Cancer:  No


Cardiac Catheterization:  Yes


Cardiovascular Problems:  Yes


High Cholesterol:  Yes


Chemotherapy:  No


Chest Pain:  Yes (few months ago)


Congestive Heart Failure:  No


COPD:  No


Cerebrovascular Accident:  Yes


Diabetes:  Yes


Diminished Hearing:  No


Endocrine:  No


Gastrointestinal Disorders:  Yes


GERD:  No


Genitourinary:  Yes (BPH)


Headaches:  Yes


Hiatal Hernia:  No


Hypertension:  Yes


Immune Disorder:  No


Kidney Stones:  No


Musculoskeletal:  No


Neurologic:  No


Psychiatric:  No


Reproductive:  No


Respiratory:  No


Myocardial Infarction:  Yes


Radiation Therapy:  No


Renal Failure:  No


Seizures:  Yes (cva)


Sickle Cell Disease:  No


Sleep Apnea:  No


Thyroid Disease:  No


Ulcer:  No





Past Surgical History


Abdominal Surgery:  No


AICD:  No


Arteriovenous Shunt:  Yes (cardiac stent 1992)


Cardiac Surgery:  Yes (angioplasty/stent)


Coronary Stent:  Yes


Ear Surgery:  No


Endocrine Surgery:  No


Eye Surgery:  No


Genitourinary Surgery:  No


Gynecologic Surgery:  No


Insulin Pump:  No


Joint Replacement:  No


Oral Surgery:  No


Pacemaker:  No


Thoracic Surgery:  No


Other Surgery:  Yes





Social History


Alcohol Use:  No


Tobacco Use:  No (QUIT OCTOBER 2013)


Substance Use:  No





Allergies-Medications


(Allergen,Severity, Reaction):  


Coded Allergies:  


     No Known Allergies (Verified  Allergy, Unknown, 3/5/18)


Reported Meds & Prescriptions





Reported Meds & Active Scripts


Active


Hydrocodone-Acetaminophen 5-325 mg Tab 1 Tab PO Q6H PRN


Clindamycin (Clindamycin HCl) 150 Mg Cap 450 Mg PO Q6H 10 Days








Review of Systems


Except as stated in HPI:  all other systems reviewed are Neg





Physical Exam


Narrative


GENERAL: Well-developed, well-nourished, awake, alert, comfortable, no apparent 

distress.


SKIN: Focused skin assessment warm/dry.  Medial aspect of the base of the left 

great toe there is moderate edema with overlying warmth with moderate purulent 

drainage.  This area is significantly tender.  There is no crepitus.


HEAD: Atraumatic. Normocephalic. 


EYES: Pupils equal and round. No scleral icterus. No injection or drainage. 


ENT: Mucous membranes pink and moist.


NECK: Trachea midline. No JVD. 


CARDIOVASCULAR: Regular rate and rhythm.  1+ bilateral dorsalis pedis pulses.


RESPIRATORY: No accessory muscle use. Clear to auscultation. Breath sounds 

equal bilaterally. 


GASTROINTESTINAL: Abdomen soft, non-tender, nondistended. 


MUSCULOSKELETAL: Skin exam as above.  The rest of his joints and extremities 

are without deformity, without tenderness, with normal range of motion.


NEUROLOGICAL: Awake and alert. No obvious cranial nerve deficits.  Motor 

grossly within normal limits. Normal speech.


PSYCHIATRIC: Appropriate mood and affect; insight and judgment normal.





Data


Data


Last Documented VS





Vital Signs








  Date Time  Temp Pulse Resp B/P (MAP) Pulse Ox O2 Delivery O2 Flow Rate FiO2


 


5/19/18 01:32 98.9 88 20 170/68 (102) 99 Room Air  








Orders





 Orders


Complete Blood Count With Diff (5/18/18 21:30)


Comprehensive Metabolic Panel (5/18/18 21:30)


Prothrombin Time / Inr (Pt) (5/18/18 21:30)


Act Partial Throm Time (Ptt) (5/18/18 21:30)


Iv Access Insert/Monitor (5/18/18 21:30)


Ecg Monitoring (5/18/18 21:30)


Oximetry (5/18/18 21:30)


Sodium Chloride 0.9% Flush (Ns Flush) (5/18/18 21:30)


Westergren Sedimentation Rate (5/18/18 21:30)


C-Reactive Protein (Crp) (5/18/18 21:30)


Blood Culture (5/18/18 21:30)


Wound Culture And Gram Stain (5/18/18 21:30)


Foot, Complete ([id number]) (5/18/18 )


Clindamycin 900 Mg/Ns Premix (Cleocin 90 (5/18/18 21:30)


Morphine Inj (Morphine Inj) (5/18/18 22:00)


Sodium Chlor 0.9% 1000 Ml Inj (Ns 1000 M (5/18/18 22:00)


Mri Foot W&W/O Contrast (5/18/18 )


Morphine Inj (Morphine Inj) (5/18/18 23:45)


Gadodiamide Pf Inj (Omniscan Pf Inj) (5/18/18 18:00)





Labs





Laboratory Tests








Test


  5/18/18


21:40


 


White Blood Count 14.5 TH/MM3 


 


Red Blood Count 5.70 MIL/MM3 


 


Hemoglobin 13.5 GM/DL 


 


Hematocrit 42.2 % 


 


Mean Corpuscular Volume 74.1 FL 


 


Mean Corpuscular Hemoglobin 23.8 PG 


 


Mean Corpuscular Hemoglobin


Concent 32.1 % 


 


 


Red Cell Distribution Width 16.5 % 


 


Platelet Count 204 TH/MM3 


 


Mean Platelet Volume 8.4 FL 


 


Neutrophils (%) (Auto) 70.6 % 


 


Lymphocytes (%) (Auto) 16.6 % 


 


Monocytes (%) (Auto) 11.8 % 


 


Eosinophils (%) (Auto) 0.2 % 


 


Basophils (%) (Auto) 0.8 % 


 


Neutrophils # (Auto) 10.3 TH/MM3 


 


Lymphocytes # (Auto) 2.4 TH/MM3 


 


Monocytes # (Auto) 1.7 TH/MM3 


 


Eosinophils # (Auto) 0.0 TH/MM3 


 


Basophils # (Auto) 0.1 TH/MM3 


 


CBC Comment DIFF FINAL 


 


Differential Comment  


 


Erythrocyte Sedimentation Rate 4 mm/hr 


 


Prothrombin Time 10.9 SEC 


 


Prothromb Time International


Ratio 1.1 RATIO 


 


 


Activated Partial


Thromboplast Time 23.6 SEC 


 


 


Blood Urea Nitrogen 14 MG/DL 


 


Creatinine 1.18 MG/DL 


 


Random Glucose 80 MG/DL 


 


Total Protein 8.0 GM/DL 


 


Albumin 4.4 GM/DL 


 


Calcium Level 9.2 MG/DL 


 


Alkaline Phosphatase 80 U/L 


 


Aspartate Amino Transf


(AST/SGOT) 32 U/L 


 


 


Alanine Aminotransferase


(ALT/SGPT) 19 U/L 


 


 


Total Bilirubin 1.0 MG/DL 


 


Sodium Level 143 MEQ/L 


 


Potassium Level 4.0 MEQ/L 


 


Chloride Level 105 MEQ/L 


 


Carbon Dioxide Level 27.2 MEQ/L 


 


Anion Gap 11 MEQ/L 


 


Estimat Glomerular Filtration


Rate 76 ML/MIN 


 


 


C-Reactive Protein 2.00 MG/DL 











MDM


Medical Decision Making


Medical Screen Exam Complete:  Yes


Emergency Medical Condition:  Yes


Differential Diagnosis


Osteomyelitis, abscess, cellulitis, gouty arthritis


Narrative Course


Initial vital signs show heart rate 98, blood pressure 100/54, pulse ox 99% on 

room air, oral temperature 98.6F.





CBC: WBC 14.5, hemoglobin 13.5, hematocrit 42.2, platelets 204, neutrophils 71%.


CMP is unremarkable.





CRP is 2.


ESR is 4.





Left foot x-ray:


CONCLUSION:     


Suspected chronic deformity of the hindfoot.





Patient was made aware of all findings.  He still complaining of significant 

pain in his left forefoot medially at the base of his great toe despite 

receiving 4 mg of IV morphine.  He was also provided 600 mg of clindamycin.  He 

has significant tenderness and swelling with purulent drainage and a small open 

wound.  MRI will be performed to evaluate for possible osteomyelitis.





MRI of the left foot:


CONCLUSION:     


1. No definite evidence for osteomyelitis. No discrete abscess.


2. Unusual multifocal subchondral marrow edema throughout the left foot and 

ankle as above. The finding is nonspecific. It can be related to immobilization 

of the foot or limited use of the foot.





Patient and the patient's caretaker were made aware of all findings.  He is 

resting comfortably.  Pain is improved with morphine.  He was given a dose of 

900 mg of clindamycin here in the emergency department.  On initial 

presentation the patient has an area of tenderness and mild edema to the left 

medial foot at the base of the first toe with small amount of purulent drainage 

from a superficial/circular wound that is approximately 2 mm in diameter.  

There is no crepitus.  No fluctuance or induration.  The left foot seems well 

perfused.  The patient and the patient's caretaker report that this drainage 

started today.  He is afebrile.  His ESR is less than 4.  At this point he more 

than likely has cellulitis and less likely has osteomyelitis.  The multifocal 

subchondral marrow edema makes sense as the patient has history of CVA with 

left upper and left lower extremity weakness as well as limited mobility 

secondary to the stroke, requiring home health care.  Patient is stable for 

discharge home, the patient's caretaker feels comfortable taking care of him at 

home with strict return instructions.  I will give her the information to the on

-call podiatrist with him to follow-up with this week.  The patient can also 

follow[facility]  They 

were advised on when to return to the emergency department.  They verbalized 

understanding and agreement with plan.





Upon discharge the patient complains of worsening pain to his left foot 

particularly over the first MTP joint medially where he has the warmth and 

erythema as well as small open wound that presented initially with purulent 

drainage.  Because of ongoing pain, the patient will be admitted for further 

antibiotic therapy for left foot infection/cellulitis, rule out osteomyelitis.  

Case discussed with hospitalist Dr. Mcdonald who will admit the patient to her 

service.





Diagnosis





 Primary Impression:  


 Cellulitis of left foot


 Additional Impressions:  


 Intractable pain


 R/O Osteomyelitis





Admitting Information


Admitting Physician Requests:  Observation


Referrals:  


Ad Gracia DPM


3 days


Podiatrist





Primary Care Physician


3 days





***Additional Instructions:  


Follow-up with your primary care physician this week.


Follow-up with podiatrist Dr. Gracia or a podiatrist of your choice this week.


Take antibiotic as prescribed.


Return to the emergency department for worsening symptoms or any other concerns.


Scripts


Hydrocodone-Acetaminophen (Hydrocodone-Acetaminophen) 5-325 mg Tab


1 TAB PO Q6H Y for PAIN, #15 TAB 0 Refills


   Prov: Reece Mulligan MD         5/19/18 


Clindamycin (Clindamycin) 150 Mg Cap


450 MG PO Q6H for Infection for 10 Days, #120 CAP 0 Refills


   Prov: Reece Mulligan MD         5/19/18











Reece Mulligan MD May 18, 2018 21:35

## 2018-05-18 NOTE — RADRPT
EXAM DATE/TIME:  05/18/2018 21:46 

 

[facility] COMPARISON:     

FOOT LEFT COMPLETE ([id number]), March 05, 2018, 21:52.

 

                     

INDICATIONS :     

Pain.

                     

 

MEDICAL HISTORY :            

Hypertension. Hypercholesterolemia. Myocardial infarction. CVA.   

 

SURGICAL HISTORY :        

Coronary artery stent.

 

ENCOUNTER:     

Initial                                        

 

ACUITY:     

1 week      

 

PAIN SCORE:     

5/10

 

LOCATION:     

Left  entire foot.

 

FINDINGS:     

The sub-talar joint is not visualized. Some degree of collapse of the calcaneus may be present. The c
onfiguration of the hindfoot is unchanged from the prior exam. The mid and forefoot appear grossly no
rmal. There is a minimal hallux valgus deformity.

 

CONCLUSION:     

Suspected chronic deformity of the hindfoot.

 

 

 

 Iván Shields MD on May 18, 2018 at 22:27           

Board Certified Radiologist.

 This report was verified electronically.

## 2018-05-19 VITALS
DIASTOLIC BLOOD PRESSURE: 69 MMHG | HEART RATE: 80 BPM | SYSTOLIC BLOOD PRESSURE: 96 MMHG | OXYGEN SATURATION: 98 % | TEMPERATURE: 99.7 F | RESPIRATION RATE: 18 BRPM

## 2018-05-19 VITALS
DIASTOLIC BLOOD PRESSURE: 68 MMHG | SYSTOLIC BLOOD PRESSURE: 170 MMHG | RESPIRATION RATE: 20 BRPM | OXYGEN SATURATION: 99 % | HEART RATE: 88 BPM | TEMPERATURE: 98.9 F

## 2018-05-19 VITALS
TEMPERATURE: 99.1 F | HEART RATE: 68 BPM | DIASTOLIC BLOOD PRESSURE: 68 MMHG | RESPIRATION RATE: 18 BRPM | SYSTOLIC BLOOD PRESSURE: 116 MMHG

## 2018-05-19 VITALS
OXYGEN SATURATION: 98 % | TEMPERATURE: 98 F | RESPIRATION RATE: 16 BRPM | HEART RATE: 68 BPM | SYSTOLIC BLOOD PRESSURE: 124 MMHG | DIASTOLIC BLOOD PRESSURE: 66 MMHG

## 2018-05-19 VITALS
OXYGEN SATURATION: 95 % | DIASTOLIC BLOOD PRESSURE: 59 MMHG | TEMPERATURE: 99.4 F | SYSTOLIC BLOOD PRESSURE: 105 MMHG | RESPIRATION RATE: 18 BRPM | HEART RATE: 92 BPM

## 2018-05-19 VITALS
HEART RATE: 105 BPM | RESPIRATION RATE: 16 BRPM | TEMPERATURE: 98 F | SYSTOLIC BLOOD PRESSURE: 95 MMHG | DIASTOLIC BLOOD PRESSURE: 51 MMHG | OXYGEN SATURATION: 98 %

## 2018-05-19 LAB
CRP SERPL-MCNC: 12.1 MG/DL (ref 0–0.3)
RHEUMATOID FACT SER QL LA: NEGATIVE

## 2018-05-19 RX ADMIN — Medication SCH ML: at 20:37

## 2018-05-19 RX ADMIN — VANCOMYCIN HYDROCHLORIDE SCH MLS/HR: 1 INJECTION, SOLUTION INTRAVENOUS at 12:07

## 2018-05-19 RX ADMIN — HYDROCODONE BITARTRATE AND ACETAMINOPHEN PRN TAB: 5; 325 TABLET ORAL at 15:38

## 2018-05-19 RX ADMIN — HYDROCODONE BITARTRATE AND ACETAMINOPHEN PRN TAB: 5; 325 TABLET ORAL at 21:58

## 2018-05-19 RX ADMIN — STANDARDIZED SENNA CONCENTRATE AND DOCUSATE SODIUM SCH TAB: 8.6; 5 TABLET, FILM COATED ORAL at 20:36

## 2018-05-19 RX ADMIN — Medication SCH ML: at 12:06

## 2018-05-19 RX ADMIN — STANDARDIZED SENNA CONCENTRATE AND DOCUSATE SODIUM SCH TAB: 8.6; 5 TABLET, FILM COATED ORAL at 09:00

## 2018-05-19 NOTE — HHI.HP
__________________________________________________





[facility]


Service


[facility]


Primary Care Physician


[facility]


Admission Diagnosis





Left foot cellulitis, intractable foot pain, R/O osteomyelitis


Diagnoses:  


(1) Cellulitis of left foot


Diagnosis:  Principal





(2) Intractable pain


Diagnosis:  Principal





Travel History


International Travel<30 Days:  No


Contact w/Intl Traveler <30 Da:  No


Traveled to Known Affected Are:  No


History of Present Illness


This is a 62-year-old male with a PMH of HTN, Hyperlipidemia, BPH and h/o CVA w

/ Left-Sided Weakness who was brought to the ER for left foot pain and 

swelling.  Notes ongoing swelling and tenderness to left great toe for approx 

1mo, however symptoms progressively worse since yesterday.  Now w/ foul-

smelling drainage.  Pain is constant, severe, 8/10, non-radiating, worse w/ 

ambulation.  [facility].  Denies 

fever or chills.  On arrival, /54, HR 98, O2 sat 99% RA, Afebrile.  WBC 

14.5.  Chemistry unremarkable.  CRP 2.0.  INR 1.1.  Foot X-ray suspected 

chronic deformity of hindfoot.  MRI Foot with no definitive evidence of 

osteomyelitis however unusual multifocal marrow edema throughout left foot and 

ankle, nonspecific.  S/p Clinda in ER in addition to multiple doses of pain 

medication w/ minimal improvement.





Review of Systems


Except as stated in HPI:  all other systems reviewed are Neg


ROS: 14 point review of systems otherwise negative.





Past Family Social History


Past Medical History


PMH:  HTN, Hyperlipidemia, BPH and h/o CVA w/ Left-Sided Weakness


Past Surgical History


PAST SURGICAL HISTORY: Cardiac Stent


Allergies:  


Coded Allergies:  


     No Known Allergies (Verified  Allergy, Unknown, 3/5/18)


Family History


PAST FAMILY HISTORY:  Reviewed.  No h/o DM or CAD


Social History


PAST SOCIAL HISTORY: Negative for alcohol, tobacco or drugs.





Physical Exam


Vital Signs





Vital Signs








  Date Time  Temp Pulse Resp B/P (MAP) Pulse Ox O2 Delivery O2 Flow Rate FiO2


 


18 02:42        


 


18 01:32 98.9 88 20 170/68 (102) 99 Room Air  


 


18 18:16 98.6 98 18 100/54 (69) 99   








Physical Exam


PE:


GENERAL: Very pleasant middle-aged black male in no acute distress.


HEENT: PERRLA, EOMI. No scleral icterus or conjunctival pallor. No lid lag or 

facial droop.  


CARDIOVASCULAR: Regular rate and rhythm.  No obvious murmurs to auscultation. 

No chest tenderness to palpation. 


RESPIRATORY: No obvious rhonchi or wheezing. Clear to auscultation. Breath 

sounds equal bilaterally. 


GASTROINTESTINAL: Abdomen soft, non-tender, nondistended. BS normal. 


MUSCULOSKELETAL: Extremities without clubbing, cyanosis, or edema. No obvious 

deformities. Left foot w/ +edema/warmth, +purulent drainage, +tenderness to 

palpation.


NEUROLOGICAL: Awake, alert and oriented x4. No focal neurologic deficits. 

Moving both upper and lower extremities spontaneously.


Laboratory





Laboratory Tests








Test


  18


21:40


 


White Blood Count 14.5 


 


Red Blood Count 5.70 


 


Hemoglobin 13.5 


 


Hematocrit 42.2 


 


Mean Corpuscular Volume 74.1 


 


Mean Corpuscular Hemoglobin 23.8 


 


Mean Corpuscular Hemoglobin


Concent 32.1 


 


 


Red Cell Distribution Width 16.5 


 


Platelet Count 204 


 


Mean Platelet Volume 8.4 


 


Neutrophils (%) (Auto) 70.6 


 


Lymphocytes (%) (Auto) 16.6 


 


Monocytes (%) (Auto) 11.8 


 


Eosinophils (%) (Auto) 0.2 


 


Basophils (%) (Auto) 0.8 


 


Neutrophils # (Auto) 10.3 


 


Lymphocytes # (Auto) 2.4 


 


Monocytes # (Auto) 1.7 


 


Eosinophils # (Auto) 0.0 


 


Basophils # (Auto) 0.1 


 


CBC Comment DIFF FINAL 


 


Differential Comment  


 


Erythrocyte Sedimentation Rate 4 


 


Prothrombin Time 10.9 


 


Prothromb Time International


Ratio 1.1 


 


 


Activated Partial


Thromboplast Time 23.6 


 


 


Blood Urea Nitrogen 14 


 


Creatinine 1.18 


 


Random Glucose 80 


 


Total Protein 8.0 


 


Albumin 4.4 


 


Calcium Level 9.2 


 


Alkaline Phosphatase 80 


 


Aspartate Amino Transf


(AST/SGOT) 32 


 


 


Alanine Aminotransferase


(ALT/SGPT) 19 


 


 


Total Bilirubin 1.0 


 


Sodium Level 143 


 


Potassium Level 4.0 


 


Chloride Level 105 


 


Carbon Dioxide Level 27.2 


 


Anion Gap 11 


 


Estimat Glomerular Filtration


Rate 76 


 


 


C-Reactive Protein 2.00 














 Date/Time


Source Procedure


Growth Status


 


 


 18 21:40


Blood Peripheral Aerobic Blood Culture


Pending Received


 


 18 21:40


Blood Peripheral Anaerobic Blood Culture


Pending Received





 18 21:30


Wound Foot Gram Stain


Pending Received


 


 18 21:30


Wound Foot Wound Culture


Pending Received








Result Diagram:  


18








Caprini VTE Risk Assessment


Caprini VTE Risk Assessment:  No/Low Risk (score <= 1)


Caprini Risk Assessment Model











 Point Value = 1          Point Value = 2  Point Value = 3  Point Value = 5


 


Age 41-60


Minor surgery


BMI > 25 kg/m2


Swollen legs


Varicose veins


Pregnancy or postpartum


History of unexplained or recurrent


   spontaneous 


Oral contraceptives or hormone


   replacement


Sepsis (< 1 month)


Serious lung disease, including


   pneumonia (< 1 month)


Abnormal pulmonary function


Acute myocardial infarction


Congestive heart failure (< 1 month)


History of inflammatory bowel disease


Medical patient at bed rest Age 61-74


Arthroscopic surgery


Major open surgery (> 45 min)


Laparoscopic surgery (> 45 min)


Malignancy


Confined to bed (> 72 hours)


Immobilizing plaster cast


Central venous access Age >= 75


History of VTE


Family history of VTE


Factor V Leiden


Prothrombin 49151V


Lupus anticoagulant


Anticardiolipin antibodies


Elevated serum homocysteine


Heparin-induced thrombocytopenia


Other congenital or acquired


   thrombophilia Stroke (< 1 month)


Elective arthroplasty


Hip, pelvis, or leg fracture


Acute spinal cord injury (< 1 month)








Prophylaxis Regimen











   Total Risk


Factor Score Risk Level Prophylaxis Regimen


 


0-1      Low Early ambulation


 


2 Moderate Order ONE of the following:


*Sequential Compression Device (SCD)


*Heparin 5000 units SQ BID


 


3-4 Higher Order ONE of the following medications:


*Heparin 5000 units SQ TID


*Enoxaparin/Lovenox 40 mg SQ daily (WT < 150 kg, CrCl > 30 mL/min)


*Enoxaparin/Lovenox 30 mg SQ daily (WT < 150 kg, CrCl > 10-29 mL/min)


*Enoxaparin/Lovenox 30 mg SQ BID (WT < 150 kg, CrCl > 30 mL/min)


AND/OR


*Sequential Compression Device (SCD)


 


5 or more Highest Order ONE of the following medications:


*Heparin 5000 units SQ TID (Preferred with Epidurals)


*Enoxaparin/Lovenox 40 mg SQ daily (WT < 150 kg, CrCl > 30 mL/min)


*Enoxaparin/Lovenox 30 mg SQ daily (WT < 150 kg, CrCl > 10-29 mL/min)


*Enoxaparin/Lovenox 30 mg SQ BID (WT < 150 kg, CrCl > 30 mL/min)


AND


*Sequential Compression Device (SCD)











Assessment and Plan


Problem List:  


(1) Cellulitis of left foot


ICD Code:  L03.116 - Cellulitis of left lower limb


Status:  Acute


(2) Intractable pain


ICD Code:  R52 - Pain, unspecified


Status:  Acute


Assessment and Plan


A/P:


1.  Left Foot Cellulitis:  x1 month, X-ray w/ suspected chronic deformity 

hindfoot, MRI Foot w/ no definite osteomyelitis, however unusual marrow edema 

throughout left foot and ankle, images reviewed by me.  Consult Podiatry for 

further evaluation, possible intervention.  S/p Clinda in ER, will continue w/ 

IV Abx. 


2.  Intractable Pain:  secondary to above, continue analgesics/antiemetics as 

needed. 


3.  DVT Prophylaxis:  Mechanical contraindication secondary to wound/cellulitis


4.  Social work for d/c planning as needed.  


5.  Case discussed w/ ER physician at length, labs/records/imaging reviewed by 

me.











Macie Mcdonald MD May 19, 2018 03:11

## 2018-05-19 NOTE — MB
cc:

Ad Gracia DPM

****

 

 

DATE:

05/19/2018

 

REASON FOR CONSULTATION:

Left first MPJ pain, cellulitis, rule out osteomyelitis.

 

HISTORY OF PRESENT ILLNESS:

This is a 62-year-old male who has a history of increasing pain for 

the greater part of 1 month of the left great toe.  The pain is 

constant; however, it is decreased over the last 24 hours since he has

been in the hospital.  The patient's caregiver serves as a historian. 

Apparently, they were worked up for gout, cannot remember if there is 

any blood work, but that the pain has only been worsening up until 

just recently.

 

PAST MEDICAL HISTORY:

Positive for left-sided weakness, cardiac stenting, BPH, and CVA.

 

ALLERGIES:

NO KNOWN DRUG ALLERGIES.

 

INPATIENT MEDICATIONS:

Reviewed.  The patient is receiving Clindamycin.

 

PHYSICAL EXAMINATION:

VITAL SIGNS:  Temperature 98, pulse rate 68, respiratory rate 16, 

blood pressure 124/66.  He is sating 98% on room air.

GENERAL:  This is an alert and oriented gentleman.  It appears that he

has somewhat slurred speech, but per caregiver, this is baseline.

EXTREMITIES:  The patient has mild contractures of the left foot and 

ankle.  There appears to be significant pain.  Mild induration of skin

and a shallow scabbed superficial ulcer of the medial aspect of the 

first MPJ.  Upon trying to touch this area, it is significantly 

tender.  Upon palpating the midfoot, hindfoot and ankle, there is no 

obvious focal tenderness, erythema, inflammation or ulceration.  

Pulses are hard to palpate, but they are audible via Doppler.  

Sensation appears to be intact to light touch and deep pressure.  

There is limited range of motion of the hindfoot and ankle.  Right 

lower extremity, there are no obvious open lesions of concern.

 

LABORATORY STUDIES:

WBC 14.  Hemoglobin and hematocrit, 13 and 42.  Platelet count 204.  

Chem-7:  Sodium 143, potassium 4.0, chloride 105, CO2 of 27.2.  BUN is

14.  Random glucose is 80, creatinine 1.18.  Coagulation profile:  PT 

10.9, INR 1.1.   Microbial findings:  Wound culture appears to show 

gram-positive cocci in pairs and clusters.  Blood culture ordered and 

pending.

 

IMAGING FINDINGS:

Foot x-ray appears to be relatively within normal limits, without any 

obvious bony erosive process; however, there is obliteration of the 

subtalar joint, possible some kind of hindfoot fusion or coalition of 

some kind.  MRI is more impressive.  It shows significant edema, more 

multifocal within the distal tibia, base of the first metatarsal; 

metatarsal heads 2, 3, and 4.  At the level of concern clinically, the

first MPJ there is a very small pinhole ulcer that may communicate 

with the joint capsule, but at this level there is no obvious 

significant enhancement to show signs of a septic first MPJ or bony 

edema of the first MPJ.  It is more a finding consistent with 

arthritis.

 

ASSESSMENT AND PLAN:

Left first metatarsophalangeal joint cellulitis, superficial wound.  

My recommendation is routine blood work, rule out gout and other 

rheumatoid type process.  Continue IV antibiotics.  Consider switching

to vancomycin.  We will discuss with medicine.  A segmental arterial 

Doppler ordered.  I do not anticipate the patient will need any 

debridement; however, early capsulitis or capsular infection or early 

septic first MPJ cannot be excluded at this point.  I will continue to

follow the patient.  I will see him within the next day or so to 

monitor his clinical progress and advise.

 

 

__________________________________

JENIFFER Lopez/CRYSTAL

D: 05/19/2018, 09:58 AM

T: 05/19/2018, 10:56 AM

Visit #: [id number]

Job #: [id number]

SHAAN

## 2018-05-19 NOTE — RADRPT
EXAM DATE/TIME:  05/19/2018 00:00 

 

[facility] COMPARISON:     

No previous studies available for comparison.

        

 

INDICATIONS :     

Left foot cellulitis

 

 

TECHNIQUE:    

Five-station segmental examination of the lower extremities was performed.

Pulsed-cuff waveform tracings and pressures were recorded.  Ankle-brachial indices and

toe-brachial indices were calculated.

 

PRESSURES (mmHg):     

 

Brachial (arm):         

Right  IV SITE     Left   71

 

Lower Thigh:                       

Right  60     Left   74

 

Calf:             

Right  32     Left   63

 

Ankle:     

Right  40     Left   54

 

Toe:      

Right  0     Left   0

 

JUVENAL:     

Right  0.56     Left   0.76

 

TBI:     

Right  0.00     Left   0.00

 

PULSED CUFF WAVEFORMS:     

Severely reduced amplitude throughout the lower extremities, more pronounced on the right.

 

CONCLUSION:     

1. Findings consistent with severe right and moderate left lower extremity peripheral arterial disejimbo Sanon MD on May 19, 2018 at 12:39           

Board Certified Radiologist.

 This report was verified electronically.

## 2018-05-19 NOTE — HHI.PR
Subjective


Remarks


Follow up for left foot wound/infection. The patient reports continue pain 

throughout the entire left foot, worse at the wound on the medial first MTP 

joint. Denies fevers/chills. He reports some white purulent drainage from the 

wound yesterday but none today. He reports some associated swelling and warmth 

of the left foot. Denies any other medical complaints.





Objective


Vitals





Vital Signs








  Date Time  Temp Pulse Resp B/P (MAP) Pulse Ox O2 Delivery O2 Flow Rate FiO2


 


5/19/18 08:30 98.0 68 16 124/66 (85) 98   


 


5/19/18 03:14 99.1 68 18 116/68 (84)    


 


5/19/18 02:42        


 


5/19/18 01:32 98.9 88 20 170/68 (102) 99 Room Air  


 


5/18/18 18:16 98.6 98 18 100/54 (69) 99   








Result Diagram:  


5/18/18 2140 5/18/18 2140





Imaging





Last Impressions








Foot X-Ray 5/18/18 0000 Signed





Impressions: 





 Service Date/Time:  Friday, May 18, 2018 21:46 - CONCLUSION:  Suspected 

chronic 





 deformity of the hindfoot.     Iván Shields MD 


 


Foot MRI 5/18/18 0000 Signed





Impressions: 





 Service Date/Time:  Saturday, May 19, 2018 00:12 - CONCLUSION:  1. No definite 





 evidence for osteomyelitis. No discrete abscess. 2. Unusual multifocal 





 subchondral marrow edema throughout the left foot and ankle as above. The 





 finding is nonspecific. It can be related to immobilization of the foot or 





 limited use of the foot.     Sumit Arteaga MD 








Objective Remarks


GENERAL: Well-nourished, well-developed male patient in NAD.


SKIN: Warm and dry. No rash.


HEENT:  Normocephalic. Atraumatic. Pupils equal and round.  Mucous membranes 

pink and moist.


CARDIOVASCULAR: Regular rate and rhythm.  No murmur appreciated. 


RESPIRATORY: No accessory muscle use. Clear to auscultation. Breath sounds 

equal bilaterally.  


GASTROINTESTINAL: Abdomen soft, non-tender, nondistended. Normoactive bowel 

sounds x4.


MUSCULOSKELETAL: Superficial 1.5cm ulcer at left first medial MTP with 

induration and scabbed centrally. Left foot diffusely tender to touch with mild 

diffuse warmth and edema. 


NEUROLOGICAL: Awake and alert. No obvious cranial nerve deficits.  Motor 

grossly within normal limits. Moving all extremities spontaneously. Normal 

speech.


PSYCHIATRIC: Appropriate mood and affect; insight and judgment normal.


Medications and IVs





Current Medications








 Medications


  (Trade)  Dose


 Ordered  Sig/Antony


 Route  Start Time


 Stop Time Status Last Admin


 


  (NS Flush)  2 ml  UNSCH  PRN


 IV FLUSH  5/19/18 02:00


     


 


 


  (NS Flush)  2 ml  BID


 IV FLUSH  5/19/18 09:00


    5/19/18 12:06


 


 


  (Reglan Inj)  5 mg  Q6H  PRN


 IV PUSH  5/19/18 02:00


     


 


 


  (Tylenol)  650 mg  Q6H  PRN


 PO  5/19/18 02:00


     


 


 


  (Norco  5-325 Mg)  1 tab  Q4H  PRN


 PO  5/19/18 02:00


     


 


 


  (Morphine Inj)  2 mg  Q3H  PRN


 IV PUSH  5/19/18 02:15


    5/19/18 12:07


 


 


  (Yeimi-Colace)  1 tab  BID


 PO  5/19/18 09:00


     


 


 


  (Milk Of


 Magnesia Liq)  30 ml  Q12H  PRN


 PO  5/19/18 02:00


     


 


 


  (Senokot)  17.2 mg  Q12H  PRN


 PO  5/19/18 02:00


     


 


 


  (Dulcolax Supp)  10 mg  DAILY  PRN


 RECTAL  5/19/18 02:00


     


 


 


  (Lactulose Liq)  30 ml  DAILY  PRN


 PO  5/19/18 02:00


     


 


 


 Pharmacy Profile


 Note  0 ml @ 0


 mls/hr  UNSCH


 OTHER  5/19/18 10:45


     


 


 


 Vancomycin HCl


 1000 mg/Sodium


 Chloride  250 ml @ 


 250 mls/hr  Q24H


 IV  5/19/18 12:00


    5/19/18 12:07


 


 


  ([facility]


 Ordered Lab Info)  SPECIFIC


 LAB TO BE


 ...  ONCE  ONCE


 .XX  5/22/18 11:45


 5/22/18 11:46   


 











A/P


Problem List:  


(1) Cellulitis of left foot


ICD Code:  L03.116 - Cellulitis of left lower limb


Status:  Acute


(2) Intractable pain


ICD Code:  R52 - Pain, unspecified


Status:  Acute


Assessment and Plan


62-year-old male with a PMH of HTN, HLD, BPH and h/o CVA w/ Left-Sided Weakness 

who was brought to the ER for left foot pain and swelling. 





Sepsis with Left Foot Cellulitis: Concern for septic joint vs osteomyelitis. 

Symptoms x1 month with worsening wound at left medial MTP and diffuse pain/

warmth/edema. WBC 14.5K, tachycardic, with suspected source-cellulitis vs 

septic joint. 


   -X-ray reviewed, shows suspected chronic deformity hindfoot


   -MRI Foot reviewed, no definite osteomyelitis, however unusual marrow edema 

throughout left foot and ankle


   -Blood cultures collect with NGTD


   -Wound culture collected and pending


   -Consult Podiatry for further evaluation, possible intervention.  


   -Discussed with Dr. Gracia, recommends changing antibiotics from IV Clinda 

to IV Vanco (pharmacy consulted)


   -Check arterial doppler, uric acid, RF, TOMY screen


   -Admit to inpatient, will continue to monitor





Intractable Pain:  secondary to above


   -continue analgesics/antiemetics as needed. 





All other medical conditions stable, continue home meds as appropriate. 





DVT Prophylaxis:  Mechanical contraindication secondary to wound/cellulitis. 

Chemoprophylaxis on hold with possible upcoming procedure











Suad Mary PA-C May 19, 2018 09:13

## 2018-05-19 NOTE — RADRPT
EXAM DATE/TIME:  05/19/2018 00:12 

 

[facility] COMPARISON:     

No previous studies available for comparison.

       

 

 

INDICATIONS :     

Osteomyelitis. Left medial great toe wound by MTPJ.

                     

 

CONTRAST:     

10 cc Omniscan (gadodiamide) IV

                     

 

MEDICAL HISTORY :     

Myocardial infarction. Stroke Hypertension. 

 

SURGICAL HISTORY :     

Coronary artery stent.     

 

ENCOUNTER:     

Initial

 

ACUITY:     

2 day

 

PAIN SCORE:     

10/10

 

LOCATION:     

Left   great toe

 

TECHNIQUE:     

Multiplanar, multisequence MRI examination was performed without contrast and after the intravenous a
dministration of gadolinium.

 

FINDINGS:     

There is multifocal subchondral bone marrow edema in the ankle and left foot. This includes in the di
stal tibia and fibula and to a lesser extent in the talar dome. There is also focal involvement of th
e proximal first metatarsal and minimally in the proximal second metatarsal. There is also multifocal
 edema involving the phalanges in the subchondral regions and in the second, third, fourth and fifth 
metatarsal heads. 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

 

 Postcontrast images reveal some very minimal contrast enhancement in these areas of edema. No focal 
intense contrast-enhancement is identified at the first MCP. No definite evidence for osteomyelitis.

 

CONCLUSION:     

1. No definite evidence for osteomyelitis. No discrete abscess.

2. Unusual multifocal subchondral marrow edema throughout the left foot and ankle as above. The findi
ng is nonspecific. It can be related to immobilization of the foot or limited use of the foot.

 

 

 

 Sumit Arteaga MD on May 19, 2018 at 0:52           

Board Certified Radiologist.

 This report was verified electronically.

## 2018-05-20 VITALS
HEART RATE: 94 BPM | DIASTOLIC BLOOD PRESSURE: 52 MMHG | TEMPERATURE: 98.7 F | OXYGEN SATURATION: 97 % | SYSTOLIC BLOOD PRESSURE: 83 MMHG | RESPIRATION RATE: 20 BRPM

## 2018-05-20 VITALS
HEART RATE: 67 BPM | RESPIRATION RATE: 20 BRPM | SYSTOLIC BLOOD PRESSURE: 113 MMHG | DIASTOLIC BLOOD PRESSURE: 64 MMHG | TEMPERATURE: 98.6 F | OXYGEN SATURATION: 98 %

## 2018-05-20 VITALS
OXYGEN SATURATION: 97 % | DIASTOLIC BLOOD PRESSURE: 67 MMHG | TEMPERATURE: 100.5 F | HEART RATE: 75 BPM | SYSTOLIC BLOOD PRESSURE: 108 MMHG | RESPIRATION RATE: 20 BRPM

## 2018-05-20 VITALS
RESPIRATION RATE: 19 BRPM | TEMPERATURE: 99.6 F | DIASTOLIC BLOOD PRESSURE: 67 MMHG | HEART RATE: 88 BPM | OXYGEN SATURATION: 97 % | SYSTOLIC BLOOD PRESSURE: 111 MMHG

## 2018-05-20 VITALS
SYSTOLIC BLOOD PRESSURE: 97 MMHG | TEMPERATURE: 100 F | HEART RATE: 81 BPM | RESPIRATION RATE: 17 BRPM | OXYGEN SATURATION: 98 % | DIASTOLIC BLOOD PRESSURE: 68 MMHG

## 2018-05-20 VITALS
OXYGEN SATURATION: 97 % | HEART RATE: 93 BPM | RESPIRATION RATE: 19 BRPM | TEMPERATURE: 99.3 F | SYSTOLIC BLOOD PRESSURE: 109 MMHG | DIASTOLIC BLOOD PRESSURE: 68 MMHG

## 2018-05-20 VITALS — SYSTOLIC BLOOD PRESSURE: 108 MMHG | DIASTOLIC BLOOD PRESSURE: 67 MMHG

## 2018-05-20 LAB
ALBUMIN SERPL-MCNC: 3.6 GM/DL (ref 3.4–5)
ALP SERPL-CCNC: 65 U/L (ref 45–117)
ALT SERPL-CCNC: 15 U/L (ref 12–78)
AST SERPL-CCNC: 19 U/L (ref 15–37)
BASOPHILS # BLD AUTO: 0.1 TH/MM3 (ref 0–0.2)
BASOPHILS NFR BLD: 0.4 % (ref 0–2)
BILIRUB SERPL-MCNC: 1.1 MG/DL (ref 0.2–1)
BUN SERPL-MCNC: 16 MG/DL (ref 7–18)
CALCIUM SERPL-MCNC: 8.5 MG/DL (ref 8.5–10.1)
CHLORIDE SERPL-SCNC: 105 MEQ/L (ref 98–107)
CREAT SERPL-MCNC: 1.08 MG/DL (ref 0.6–1.3)
EOSINOPHIL # BLD: 0 TH/MM3 (ref 0–0.4)
EOSINOPHIL NFR BLD: 0.2 % (ref 0–4)
ERYTHROCYTE [DISTWIDTH] IN BLOOD BY AUTOMATED COUNT: 16.7 % (ref 11.6–17.2)
GFR SERPLBLD BASED ON 1.73 SQ M-ARVRAT: 84 ML/MIN (ref 89–?)
GLUCOSE SERPL-MCNC: 87 MG/DL (ref 74–106)
HCO3 BLD-SCNC: 25.5 MEQ/L (ref 21–32)
HCT VFR BLD CALC: 35.5 % (ref 39–51)
HGB BLD-MCNC: 11.7 GM/DL (ref 13–17)
LYMPHOCYTES # BLD AUTO: 2.4 TH/MM3 (ref 1–4.8)
LYMPHOCYTES NFR BLD AUTO: 15 % (ref 9–44)
MCH RBC QN AUTO: 24.4 PG (ref 27–34)
MCHC RBC AUTO-ENTMCNC: 33 % (ref 32–36)
MCV RBC AUTO: 73.8 FL (ref 80–100)
MONOCYTE #: 1.8 TH/MM3 (ref 0–0.9)
MONOCYTES NFR BLD: 11.2 % (ref 0–8)
NEUTROPHILS # BLD AUTO: 11.7 TH/MM3 (ref 1.8–7.7)
NEUTROPHILS NFR BLD AUTO: 73.2 % (ref 16–70)
PLATELET # BLD: 162 TH/MM3 (ref 150–450)
PMV BLD AUTO: 8.2 FL (ref 7–11)
PROT SERPL-MCNC: 7.1 GM/DL (ref 6.4–8.2)
RBC # BLD AUTO: 4.8 MIL/MM3 (ref 4.5–5.9)
SODIUM SERPL-SCNC: 140 MEQ/L (ref 136–145)
WBC # BLD AUTO: 16 TH/MM3 (ref 4–11)

## 2018-05-20 RX ADMIN — CEFAZOLIN SODIUM IN SODIUM CHLORIDE 0.9% IV SOLN 2 GM/100ML SCH MLS/HR: 2-0.9/1 SOLUTION at 22:23

## 2018-05-20 RX ADMIN — HYDROCODONE BITARTRATE AND ACETAMINOPHEN PRN TAB: 5; 325 TABLET ORAL at 04:52

## 2018-05-20 RX ADMIN — Medication SCH ML: at 08:38

## 2018-05-20 RX ADMIN — STANDARDIZED SENNA CONCENTRATE AND DOCUSATE SODIUM SCH TAB: 8.6; 5 TABLET, FILM COATED ORAL at 08:38

## 2018-05-20 RX ADMIN — VANCOMYCIN HYDROCHLORIDE SCH MLS/HR: 1 INJECTION, SOLUTION INTRAVENOUS at 11:48

## 2018-05-20 RX ADMIN — STANDARDIZED SENNA CONCENTRATE AND DOCUSATE SODIUM SCH TAB: 8.6; 5 TABLET, FILM COATED ORAL at 21:00

## 2018-05-20 RX ADMIN — HYDROCODONE BITARTRATE AND ACETAMINOPHEN PRN TAB: 5; 325 TABLET ORAL at 10:55

## 2018-05-20 RX ADMIN — Medication SCH ML: at 22:22

## 2018-05-20 NOTE — HHI.PR
Subjective


Remarks


This is a 62-year-old male with a PMH of HTN, Hyperlipidemia, BPH and h/o CVA w

/ Left-Sided Weakness who was brought to the ER for left foot pain and 

swelling.  Notes ongoing swelling and tenderness to left great toe for approx 

1mo, however symptoms progressively worse since yesterday.  Now w/ foul-

smelling drainage.  Pain is constant, severe, 8/10, non-radiating, worse w/ 

ambulation.  [facility].  Denies 

fever or chills.  On arrival, /54, HR 98, O2 sat 99% RA, Afebrile.  WBC 

14.5.  Chemistry unremarkable.  CRP 2.0.  INR 1.1.  Foot X-ray suspected 

chronic deformity of hindfoot.  MRI Foot with no definitive evidence of 

osteomyelitis however unusual multifocal marrow edema throughout left foot and 

ankle, nonspecific.  S/p Clinda in ER in addition to multiple doses of pain 

medication w/ minimal improvement.





5-19 Follow up for left foot wound/infection. The patient reports continue pain 

throughout the entire left foot, worse at the wound on the medial first MTP 

joint. Denies fevers/chills. He reports some white purulent drainage from the 

wound yesterday but none today. He reports some associated swelling and warmth 

of the left foot. Denies any other medical complaints.





5-20 SEEN BY PODIATRY YESTERDAY


[facility] AND RN AND PT


[facility]


STILL HAS PAIN IN LEFT FOOT AREA


COMPLAINS OF LEFT FOOT PAIN





Objective


Vitals





Vital Signs








  Date Time  Temp Pulse Resp B/P (MAP) Pulse Ox O2 Delivery O2 Flow Rate FiO2


 


5/20/18 08:56      Room Air  


 


5/20/18 08:00 98.6 67 20 113/64 (80) 98   


 


5/20/18 04:00 99.3 93 19 109/68 (82) 97   


 


5/20/18 00:01 99.6 88 19 111/67 (82) 97   


 


5/20/18 00:00      Room Air  


 


5/19/18 20:18 99.4 92 18 105/59 (74) 95   


 


5/19/18 20:01 99.7 80 18 96/69 (78) 98   


 


5/19/18 15:30 98.0 105 16 95/51 (66) 98   














I/O      


 


 5/19/18 5/19/18 5/19/18 5/20/18 5/20/18 5/20/18





 07:00 15:00 23:00 07:00 15:00 23:00


 


Intake Total   500 ml   


 


Balance   500 ml   


 


      


 


Intake Oral   500 ml   


 


# Voids   4 3  








Result Diagram:  


5/20/18 0416                                                                   

             5/20/18 0416





Other Results





 Laboratory Tests








Test


  5/18/18


21:40 5/19/18


13:54 5/20/18


04:16


 


White Blood Count 14.5 TH/MM3   16.0 TH/MM3 


 


Red Blood Count 5.70 MIL/MM3   4.80 MIL/MM3 


 


Hemoglobin 13.5 GM/DL   11.7 GM/DL 


 


Hematocrit 42.2 %   35.5 % 


 


Mean Corpuscular Volume 74.1 FL   73.8 FL 


 


Mean Corpuscular Hemoglobin 23.8 PG   24.4 PG 


 


Mean Corpuscular Hemoglobin


Concent 32.1 % 


  


  33.0 % 


 


 


Red Cell Distribution Width 16.5 %   16.7 % 


 


Platelet Count 204 TH/MM3   162 TH/MM3 


 


Mean Platelet Volume 8.4 FL   8.2 FL 


 


Neutrophils (%) (Auto) 70.6 %   73.2 % 


 


Lymphocytes (%) (Auto) 16.6 %   15.0 % 


 


Monocytes (%) (Auto) 11.8 %   11.2 % 


 


Eosinophils (%) (Auto) 0.2 %   0.2 % 


 


Basophils (%) (Auto) 0.8 %   0.4 % 


 


Neutrophils # (Auto) 10.3 TH/MM3   11.7 TH/MM3 


 


Lymphocytes # (Auto) 2.4 TH/MM3   2.4 TH/MM3 


 


Monocytes # (Auto) 1.7 TH/MM3   1.8 TH/MM3 


 


Eosinophils # (Auto) 0.0 TH/MM3   0.0 TH/MM3 


 


Basophils # (Auto) 0.1 TH/MM3   0.1 TH/MM3 


 


CBC Comment DIFF FINAL   DIFF FINAL 


 


Differential Comment     


 


Erythrocyte Sedimentation Rate 4 mm/hr   


 


Prothrombin Time 10.9 SEC   


 


Prothromb Time International


Ratio 1.1 RATIO 


  


  


 


 


Activated Partial


Thromboplast Time 23.6 SEC 


  


  


 


 


Blood Urea Nitrogen 14 MG/DL   16 MG/DL 


 


Creatinine 1.18 MG/DL   1.08 MG/DL 


 


Random Glucose 80 MG/DL   87 MG/DL 


 


Total Protein 8.0 GM/DL   7.1 GM/DL 


 


Albumin 4.4 GM/DL   3.6 GM/DL 


 


Calcium Level 9.2 MG/DL   8.5 MG/DL 


 


Alkaline Phosphatase 80 U/L   65 U/L 


 


Aspartate Amino Transf


(AST/SGOT) 32 U/L 


  


  19 U/L 


 


 


Alanine Aminotransferase


(ALT/SGPT) 19 U/L 


  


  15 U/L 


 


 


Total Bilirubin 1.0 MG/DL   1.1 MG/DL 


 


Sodium Level 143 MEQ/L   140 MEQ/L 


 


Potassium Level 4.0 MEQ/L   3.9 MEQ/L 


 


Chloride Level 105 MEQ/L   105 MEQ/L 


 


Carbon Dioxide Level 27.2 MEQ/L   25.5 MEQ/L 


 


Anion Gap 11 MEQ/L   10 MEQ/L 


 


Estimat Glomerular Filtration


Rate 76 ML/MIN 


  


  84 ML/MIN 


 


 


C-Reactive Protein 2.00 MG/DL  12.10 MG/DL  


 


Uric Acid  5.3 MG/DL  


 


Rheumatoid Factor Screen  NEGATIVE  


 


Rheumatoid Factor Titer   IU/ML  








Imaging





Last Impressions








Foot X-Ray 5/18/18 0000 Signed





Impressions: 





 Service Date/Time:  Friday, May 18, 2018 21:46 - CONCLUSION:  Suspected 

chronic 





 deformity of the hindfoot.     Iván Shields MD 


 


Foot MRI 5/18/18 0000 Signed





Impressions: 





 Service Date/Time:  Saturday, May 19, 2018 00:12 - CONCLUSION:  1. No definite 





 evidence for osteomyelitis. No discrete abscess. 2. Unusual multifocal 





 subchondral marrow edema throughout the left foot and ankle as above. The 





 finding is nonspecific. It can be related to immobilization of the foot or 





 limited use of the foot.     Sumit Arteaga MD 








Objective Remarks


GENERAL: AWAKE alert and oriented 3 talkative and cooperative


SKIN: Warm and dry.  Some darker spots on the medial malleoli of the left foot


HEAD: Atraumatic. Normocephalic. 


EYES: Pupils equal and round. No scleral icterus. No injection or drainage.  

Extraocular muscles intact


ENT: No nasal bleeding or discharge.  Mucous membranes pink and moist.  Tongue 

is midline


NECK: Trachea midline. No JVD.  Supple


CARDIOVASCULAR: Regular rate and rhythm.  S1-S2 no S3 or S4


RESPIRATORY: No accessory muscle use. Clear to auscultation. Breath sounds 

equal bilaterally. 


GASTROINTESTINAL: Abdomen soft, non-tender, nondistended. Hepatic and splenic 

margins not palpable. 


MUSCULOSKELETAL: Extremities without clubbing, cyanosis, or edema. No obvious 

deformities.  Tender left foot area


NEUROLOGICAL: Awake and alert. No obvious cranial nerve deficits.  Motor 

grossly within normal limits. Five out of 5 muscle strength in the arms and 

legs.  Normal speech.


PSYCHIATRIC: Appropriate mood and affect; insight and judgment normal.


Medications and IVs





Current Medications


Sodium Chloride (NS Flush) 2 ml UNSCH  PRN IV FLUSH FLUSH AFTER USING IV ACCESS

;  Start 5/18/18 at 21:30;  Stop 5/19/18 at 02:05;  Status DC


Clindamycin/ Sodium Chloride 50 ml @  100 mls/hr ONCE  ONCE IV  Last 

administered on 5/18/18at 22:28;  Start 5/18/18 at 21:30;  Stop 5/18/18 at 21:59

;  Status DC


Morphine Sulfate (Morphine Inj) 4 mg ONCE  ONCE IV PUSH  Last administered on 5/ 18/18at 22:18;  Start 5/18/18 at 22:00;  Stop 5/18/18 at 22:01;  Status DC


Sodium Chloride 1,000 ml @  999 mls/hr BOLUS  ONCE IV  Last administered on 5/18 /18at 22:18;  Start 5/18/18 at 22:00;  Stop 5/18/18 at 23:01;  Status DC


Morphine Sulfate (Morphine Inj) 4 mg ONCE  ONCE IV PUSH  Last administered on 5/ 18/18at 23:35;  Start 5/18/18 at 23:45;  Stop 5/18/18 at 23:46;  Status DC


Gadodiamide (Omniscan Pf Inj) 10 ml STK-MED ONCE IVCONTRAST  Last administered 

on 5/19/18at 00:36;  Start 5/18/18 at 18:00;  Stop 5/19/18 at 00:35;  Status DC


Clindamycin/ Sodium Chloride 50 ml @  100 mls/hr Q8H IV  Last administered on 5/ 19/18at 05:50;  Start 5/19/18 at 06:00;  Stop 5/19/18 at 10:37;  Status DC


Sodium Chloride (NS Flush) 2 ml UNSCH  PRN IV FLUSH FLUSH AFTER USING IV ACCESS

;  Start 5/19/18 at 02:00


Sodium Chloride (NS Flush) 2 ml BID IV FLUSH  Last administered on 5/20/18at 08:

38;  Start 5/19/18 at 09:00


Metoclopramide HCl (Reglan Inj) 5 mg Q6H  PRN IV PUSH NAUSEA OR VOMITING;  

Start 5/19/18 at 02:00


Acetaminophen (Tylenol) 650 mg Q6H  PRN PO FEVER/PAIN SCALE 1 TO 2;  Start 5/19/ 18 at 02:00


Acetaminophen/ Hydrocodone Bitart (Norco  5-325 Mg) 1 tab Q4H  PRN PO PAIN 

SCALE 3 TO 5 Last administered on 5/20/18at 10:55;  Start 5/19/18 at 02:00


Morphine Sulfate (Morphine Inj) 2 mg Q3H  PRN IV PUSH Pain 6-10 Last 

administered on 5/19/18at 12:07;  Start 5/19/18 at 02:15


Senna/Docusate Sodium (Yeimi-Colace) 1 tab BID PO ;  Start 5/19/18 at 09:00


Magnesium Hydroxide (Milk Of Magnesia Liq) 30 ml Q12H  PRN PO Mild constipation

;  Start 5/19/18 at 02:00


Sennosides (Senokot) 17.2 mg Q12H  PRN PO Moderate constipation;  Start 5/19/18 

at 02:00


Bisacodyl (Dulcolax Supp) 10 mg DAILY  PRN RECTAL SEVERE CONSITIPATION;  Start 5 /19/18 at 02:00


Lactulose (Lactulose Liq) 30 ml DAILY  PRN PO SEVERE CONSITIPATION;  Start 5/19/ 18 at 02:00


Pharmacy Profile Note 0 ml @ 0 mls/hr UNSCH OTHER ;  Start 5/19/18 at 10:45


Vancomycin HCl 1000 mg/Sodium Chloride 250 ml @  250 mls/hr Q24H IV  Last 

administered on 5/19/18at 12:07;  Start 5/19/18 at 12:00


Miscellaneous Information ([facility] Ordered Lab Info) SPECIFIC LAB TO BE 

... ONCE  ONCE .XX ;  Start 5/22/18 at 11:45;  Stop 5/22/18 at 11:46





A/P


Problem List:  


(1) Cellulitis of left foot


ICD Code:  L03.116 - Cellulitis of left lower limb


Status:  Acute


(2) Intractable pain


ICD Code:  R52 - Pain, unspecified


Status:  Acute


Assessment and Plan


62-year-old male with a PMH of HTN, HLD, BPH and h/o CVA w/ Left-Sided Weakness 

who was brought to the ER for left foot pain and swelling. 





Sepsis with Left Foot Cellulitis: Concern for septic joint vs osteomyelitis. 

Symptoms x1 month with worsening wound at left medial MTP and diffuse pain/

warmth/edema. WBC 14.5K, tachycardic, with suspected source-cellulitis vs 

septic joint. 


   -X-ray reviewed, shows suspected chronic deformity hindfoot


   -MRI Foot reviewed, no definite osteomyelitis, however unusual marrow edema 

throughout left foot and ankle


   -Blood cultures collect with NGTD


   -Wound culture collected and pending


   -Consult Podiatry for further evaluation, possible intervention.  


   -Discussed with Dr. Gracia, recommends changing antibiotics from IV Clinda 

to IV Vanco (pharmacy consulted)


   -Check arterial doppler, uric acid, RF, TOMY screen


   -Admit to inpatient, will continue to monitor





Intractable Pain:  secondary to above


   -continue analgesics/antiemetics as needed. 





All other medical conditions stable, continue home meds as appropriate. 





DVT Prophylaxis:  Mechanical contraindication secondary to wound/cellulitis. 

Chemoprophylaxis on hold with possible upcoming procedure





Staph aureus.  Per cultures await sensitivities





Not seen by infectious disease yet we await their  evaluate


Discharge Planning


Pending podiatry clearance and improvement of the foot











Marin Zamora DO May 20, 2018 11:20

## 2018-05-20 NOTE — PD.ID.CON
History of Present Illness


Service


ID


Consult Requested By


Dr Mary


Reason for Consult


MSSA bacteremia and foot infection


Primary Care Physician


[facility]


Diagnoses:  


History of Present Illness


61 yo male tobacco+,  poor historian, h/o stroke


he told me haley t he has a wound on L 1st metatarsal area for a while 


he presented with worsnening pain, swelling redness and inability to weight 

bear on his L foot


He grew MSSA from the wound asnd from 1/4 blood clx


MRI  not cw osteo


Arterila studies  with severe right and moderate left lower extremity 

peripheral arterial disease.


Dr Andrade is planning CTA





Review of Systems


ROS Limitations:  Poor Historian





Past Family Social History


Allergies:  


Coded Allergies:  


     No Known Allergies (Verified  Allergy, Unknown, 3/5/18)


Past Medical History


CVA with residual  left-sided weakness, cardiac stenting, BPH, and CVA.


Past Surgical History


cardiac stents


Active Ordered Medications


Medications where reviewed in EMR


Antibiotics Include:  vancomycin


Family History


poor historian


Social History


+ tobacco positive 1/2 ppd


no ETOH'


no drugs  no IVDU





Physical Exam


Vital Signs





Vital Signs








  Date Time  Temp Pulse Resp B/P (MAP) Pulse Ox O2 Delivery O2 Flow Rate FiO2


 


5/20/18 16:56    108/67 (81)    


 


5/20/18 16:00 100.5 75 20 108/67 (81) 97   


 


5/20/18 12:00 98.7 94 20 83/52 (62) 97   


 


5/20/18 08:56      Room Air  


 


5/20/18 08:00 98.6 67 20 113/64 (80) 98   


 


5/20/18 04:00 99.3 93 19 109/68 (82) 97   


 


5/20/18 00:01 99.6 88 19 111/67 (82) 97   


 


5/20/18 00:00      Room Air  


 


5/19/18 20:18 99.4 92 18 105/59 (74) 95   


 


5/19/18 20:01 99.7 80 18 96/69 (78) 98   








Physical Exam


CONSTITUTIONAL/GENERAL: This is a thin elderly male  patient, in no apparent 

distress.


TUBES/LINES/DRAINS:


SKIN: No jaundice, rashes, or lesions. Ecchymoses on upper extremities. No 

wounds seen anteriorly. Skin temperature appropriate. Not diaphoretic. 


HEAD: Atraumatic. Normocephalic.


EYES: Pupils equal and round and reactive. Extraocular motions intact. No 

scleral icterus. No injection or drainage. Fundi not examined.


ENT: Hearing grossly normal. Nose without bleeding or purulent drainage. Throat 

without visible erythema, exudates, masses, or lesions.


NECK: Trachea midline. Supple, nontender. No palpable thyroid enlargement or 

nodularity. 


CARDIOVASCULAR: Regular rate and rhythm without murmurs, gallops, or rubs. No 

JVD. Peripheral pulses symmetric.


RESPIRATORY/CHEST: Symmetric, unlabored respirations. Clear to auscultation. 

Breath sounds equal bilaterally. No wheezes, rales, or rhonchi.  


GASTROINTESTINAL: Abdomen soft, non-tender, nondistended. No hepato-splenomegaly

, or palpable masses. No guarding. Bowel sounds present.


GENITOURINARY: Without palpable bladder distension.  


MUSCULOSKELETAL: Extremities without clubbing, cyanosis, 


L foot with  prominent edema of great toe and 1 st MT area, dark discoloratiom, 

prominent tenderness to palpatio and open wound with no active draiange. 

Forefoot is milfldly edematous, but no ascending cellulitis, lymphangitis


Pedal pulses non palpable


No joint tenderness or effusion noted. No calf tenderness. No mottling or 

clubbing.


LYMPHATICS: No palpable cervical or supraclavicular adenopathy.


NEUROLOGICAL: Awake and alert. Baseline L hemiparesis . Follows commands. 

Confused. Moves all extremities.


PSYCHIATRIC: No obvious anxiety/depression. no apparent hallucinations or other 

psychotic thought process.


Laboratory





Laboratory Tests








Test


  5/20/18


04:16


 


White Blood Count 16.0 


 


Red Blood Count 4.80 


 


Hemoglobin 11.7 


 


Hematocrit 35.5 


 


Mean Corpuscular Volume 73.8 


 


Mean Corpuscular Hemoglobin 24.4 


 


Mean Corpuscular Hemoglobin


Concent 33.0 


 


 


Red Cell Distribution Width 16.7 


 


Platelet Count 162 


 


Mean Platelet Volume 8.2 


 


Neutrophils (%) (Auto) 73.2 


 


Lymphocytes (%) (Auto) 15.0 


 


Monocytes (%) (Auto) 11.2 


 


Eosinophils (%) (Auto) 0.2 


 


Basophils (%) (Auto) 0.4 


 


Neutrophils # (Auto) 11.7 


 


Lymphocytes # (Auto) 2.4 


 


Monocytes # (Auto) 1.8 


 


Eosinophils # (Auto) 0.0 


 


Basophils # (Auto) 0.1 


 


CBC Comment DIFF FINAL 


 


Differential Comment  


 


Blood Urea Nitrogen 16 


 


Creatinine 1.08 


 


Random Glucose 87 


 


Total Protein 7.1 


 


Albumin 3.6 


 


Calcium Level 8.5 


 


Alkaline Phosphatase 65 


 


Aspartate Amino Transf


(AST/SGOT) 19 


 


 


Alanine Aminotransferase


(ALT/SGPT) 15 


 


 


Total Bilirubin 1.1 


 


Sodium Level 140 


 


Potassium Level 3.9 


 


Chloride Level 105 


 


Carbon Dioxide Level 25.5 


 


Anion Gap 10 


 


Estimat Glomerular Filtration


Rate 84 


 














 Date/Time


Source Procedure


Growth Status


 


 


 5/19/18 17:24


Blood Peripheral Aerobic Blood Culture - Preliminary


NO GROWTH IN 1 DAY Resulted


 


 5/19/18 17:24


Blood Peripheral Anaerobic Blood Culture - Preliminary


NO GROWTH IN 1 DAY Resulted





 5/18/18 21:30


Wound Foot Gram Stain - Final Complete


 


 5/18/18 21:30 Wound Culture - Final


Staphylococcus Aureus Complete








Result Diagram:  


5/20/18 0416                                                                   

             5/20/18 0416





Imaging





Last Impressions








Foot X-Ray 5/18/18 0000 Signed





Impressions: 





 Service Date/Time:  Friday, May 18, 2018 21:46 - CONCLUSION:  Suspected 

chronic 





 deformity of the hindfoot.     Iván Shields MD 


 


Foot MRI 5/18/18 0000 Signed





Impressions: 





 Service Date/Time:  Saturday, May 19, 2018 00:12 - CONCLUSION:  1. No definite 





 evidence for osteomyelitis. No discrete abscess. 2. Unusual multifocal 





 subchondral marrow edema throughout the left foot and ankle as above. The 





 finding is nonspecific. It can be related to immobilization of the foot or 





 limited use of the foot.     Sumit Arteaga MD 











Assessment and Plan


Assessment and Plan


MSSA infected 1st MT wound 


? gout 


MSSA sepsis - 2/2 foot infection


Underlying moderate to severe PVD





    cefaozlin


   dc vancomycin


    repeat BC


   2 D echo











Charu Stanley MD May 20, 2018 19:36

## 2018-05-20 NOTE — PD.POD
Subjective


Pain score:  5


Remarks


Left foot pain continues only some improvement no events overnight





Past Med/Surg/Social History


Past Medical History


Cardiovascular:  REPORTS HX OF: Hypertension


Psychiatric:  REPORTS HX OF: Other psychiatric history (substance abuse)





Social History


Smoking Status:  Current Every Day Smoker





Objective


Vital Signs





Vital Signs








  Date Time  Temp Pulse Resp B/P (MAP) Pulse Ox O2 Delivery O2 Flow Rate FiO2


 


5/20/18 08:56      Room Air  


 


5/20/18 08:00 98.6 67 20 113/64 (80) 98   


 


5/20/18 04:00 99.3 93 19 109/68 (82) 97   


 


5/20/18 00:01 99.6 88 19 111/67 (82) 97   


 


5/20/18 00:00      Room Air  


 


5/19/18 20:18 99.4 92 18 105/59 (74) 95   


 


5/19/18 20:01 99.7 80 18 96/69 (78) 98   


 


5/19/18 15:30 98.0 105 16 95/51 (66) 98   








Coded Allergies:  


     No Known Allergies (Verified  Allergy, Unknown, 3/5/18)


Medications and IVs





Administered Medications








 Medications


  (Trade)  Dose


 Ordered  Sig/Antony


 Route


 PRN Reason  Start Time


 Stop Time Status Last Admin


Dose Admin


 


 Sodium Chloride


  (NS Flush)  2 ml  BID


 IV FLUSH


   5/19/18 09:00


    5/20/18 08:38


 


 


 Acetaminophen/


 Hydrocodone Bitart


  (Norco  5-325 Mg)  1 tab  Q4H  PRN


 PO


 PAIN SCALE 3 TO 5  5/19/18 02:00


    5/20/18 10:55


 


 


 Morphine Sulfate


  (Morphine Inj)  2 mg  Q3H  PRN


 IV PUSH


 Pain 6-10  5/19/18 02:15


    5/19/18 12:07


 


 


 Vancomycin HCl


 1000 mg/Sodium


 Chloride  250 ml @ 


 250 mls/hr  Q24H


 IV


   5/19/18 12:00


    5/20/18 11:48


 








Other Results





Laboratory Tests








Test


  5/18/18


21:40 5/20/18


04:16


 


White Blood Count 14.5 TH/MM3  16.0 TH/MM3 


 


Red Blood Count 5.70 MIL/MM3  4.80 MIL/MM3 


 


Hemoglobin 13.5 GM/DL  11.7 GM/DL 


 


Hematocrit 42.2 %  35.5 % 


 


Mean Corpuscular Volume 74.1 FL  73.8 FL 


 


Mean Corpuscular Hemoglobin 23.8 PG  24.4 PG 


 


Mean Corpuscular Hemoglobin


Concent 32.1 % 


  33.0 % 


 


 


Red Cell Distribution Width 16.5 %  16.7 % 


 


Platelet Count 204 TH/MM3  162 TH/MM3 


 


Mean Platelet Volume 8.4 FL  8.2 FL 


 


Neutrophils (%) (Auto) 70.6 %  73.2 % 


 


Lymphocytes (%) (Auto) 16.6 %  15.0 % 


 


Monocytes (%) (Auto) 11.8 %  11.2 % 


 


Eosinophils (%) (Auto) 0.2 %  0.2 % 


 


Basophils (%) (Auto) 0.8 %  0.4 % 


 


Neutrophils # (Auto) 10.3 TH/MM3  11.7 TH/MM3 


 


Lymphocytes # (Auto) 2.4 TH/MM3  2.4 TH/MM3 


 


Monocytes # (Auto) 1.7 TH/MM3  1.8 TH/MM3 


 


Eosinophils # (Auto) 0.0 TH/MM3  0.0 TH/MM3 


 


Basophils # (Auto) 0.1 TH/MM3  0.1 TH/MM3 


 


CBC Comment DIFF FINAL  DIFF FINAL 


 


Differential Comment    


 


Erythrocyte Sedimentation Rate 4 mm/hr  








Laboratory Tests








Test


  5/18/18


21:40 5/19/18


13:54 5/20/18


04:16


 


Blood Urea Nitrogen 14 MG/DL   16 MG/DL 


 


Creatinine 1.18 MG/DL   1.08 MG/DL 


 


Random Glucose 80 MG/DL   87 MG/DL 


 


Total Protein 8.0 GM/DL   7.1 GM/DL 


 


Albumin 4.4 GM/DL   3.6 GM/DL 


 


Calcium Level 9.2 MG/DL   8.5 MG/DL 


 


Alkaline Phosphatase 80 U/L   65 U/L 


 


Aspartate Amino Transf


(AST/SGOT) 32 U/L 


  


  19 U/L 


 


 


Alanine Aminotransferase


(ALT/SGPT) 19 U/L 


  


  15 U/L 


 


 


Total Bilirubin 1.0 MG/DL   1.1 MG/DL 


 


Sodium Level 143 MEQ/L   140 MEQ/L 


 


Potassium Level 4.0 MEQ/L   3.9 MEQ/L 


 


Chloride Level 105 MEQ/L   105 MEQ/L 


 


Carbon Dioxide Level 27.2 MEQ/L   25.5 MEQ/L 


 


Anion Gap 11 MEQ/L   10 MEQ/L 


 


Estimat Glomerular Filtration


Rate 76 ML/MIN 


  


  84 ML/MIN 


 


 


C-Reactive Protein 2.00 MG/DL  12.10 MG/DL  


 


Uric Acid  5.3 MG/DL  








Microbiology








 Date/Time


Source Procedure


Growth Status


 


 


 5/19/18 17:24


Blood Peripheral Aerobic Blood Culture - Preliminary


NO GROWTH IN 1 DAY Resulted


 


 5/19/18 17:24


Blood Peripheral Anaerobic Blood Culture - Preliminary


NO GROWTH IN 1 DAY Resulted





 5/18/18 21:40


Blood Peripheral Aerobic Blood Culture - Preliminary


NO GROWTH IN 2 DAYS Resulted


 


 5/18/18 21:40 Anaerobic Blood Culture - Preliminary


Staphylococcus Aureus Resulted





 5/18/18 21:30


Blood Peripheral Aerobic Blood Culture - Preliminary


NO GROWTH IN 2 DAYS Resulted


 


 5/18/18 21:30


Blood Peripheral Anaerobic Blood Culture - Preliminary


NO GROWTH IN 2 DAYS Resulted





 5/18/18 21:30


Wound Foot Gram Stain - Final Complete


 


 5/18/18 21:30 Wound Culture - Final


Staphylococcus Aureus Complete








Last 72 hours Impressions








Foot X-Ray 5/18/18 0000 Signed





Impressions: 





 Service Date/Time:  Friday, May 18, 2018 21:46 - CONCLUSION:  Suspected 

chronic 





 deformity of the hindfoot.     Iván Shields MD 


 


Foot MRI 5/18/18 0000 Signed





Impressions: 





 Service Date/Time:  Saturday, May 19, 2018 00:12 - CONCLUSION:  1. No definite 





 evidence for osteomyelitis. No discrete abscess. 2. Unusual multifocal 





 subchondral marrow edema throughout the left foot and ankle as above. The 





 finding is nonspecific. It can be related to immobilization of the foot or 





 limited use of the foot.     MD LEANDRO Francis,BERTHA RAO JR











 **Signed**





 EXAM DATE/TIME:  05/19/2018 00:00 


 


[facility] COMPARISON:     


No previous studies available for comparison.


        


 


INDICATIONS :     


Left foot cellulitis


 


 


TECHNIQUE:    


Five-station segmental examination of the lower extremities was performed.


Pulsed-cuff waveform tracings and pressures were recorded.  Ankle-brachial 

indices and


toe-brachial indices were calculated.


 


PRESSURES (mmHg):     


 


Brachial (arm):         


Right  IV SITE     Left   71


 


Lower Thigh:                       


Right  60     Left   74


 


Calf:             


Right  32     Left   63


 


Ankle:     


Right  40     Left   54


 


Toe:      


Right  0     Left   0


 


JUVENAL:     


Right  0.56     Left   0.76


 


TBI:     


Right  0.00     Left   0.00


 


PULSED CUFF WAVEFORMS:     


Severely reduced amplitude throughout the lower extremities, more pronounced on 

the right.


 


CONCLUSION:     


1. Findings consistent with severe right and moderate left lower extremity 

peripheral arterial disease.


 


 


 Yvan Sanon MD on May 19, 2018 at 12:39           


Board Certified Radiologist.


 This report was verified electronically.





Exam-Podiatry


Remarks


Left foot moderate swelling mild darkness of the medial first MPJ slight 

decrease in swelling no obvious drainage limited range of motion of the foot 

and ankle pulses hard to palpate sensation appears to be intact, mild clinical 

improvement noted





Physical Exam


General appearance:  uncomfortable


Nutritional status:  underweight


Orientation:  alert and oriented x3


Neck:  FINDINGS: normal


Chest appearance:  normal


Respiratory effort:  FINDINGS: normal


Mental status:  grossly normal


Affect:  normal





Assessment & Plan


A/P


Left foot first MPJ capsulitis with likely infection some improvement, PVD.





Patient's clinical findings show some improvement however laboratory findings 

are concerning increasing WBC with MSSA .  Continue IV antibiotics recommend 

vascular evaluation.  Will hold off on any surgical intervention at this time 

continue to follow. Will Follow 2-3 days- Dr Pool to follow.











Ad Gracia DPM May 20, 2018 13:54

## 2018-05-20 NOTE — RADRPT
EXAM DATE/TIME:  05/20/2018 17:02 

 

[facility] COMPARISON:     

No previous studies available for comparison.

 

 

INDICATIONS :     

Left foot pain for one year.

                      

 

IV CONTRAST:     

99 cc Omnipaque 350 (iohexol) IV 

 

 

RADIATION DOSE:     

17.32 CTDIvol (mGy) 

 

 

MEDICAL HISTORY :     

Stroke. Hypertension. Cardiovascular disease

 

SURGICAL HISTORY :       

AV shunt placement, angioplasty, stent placement

 

ENCOUNTER:      

Initial

 

ACUITY:      

1 yr

 

PAIN SCALE:      

7/10

 

LOCATION:       

Left  foot

 

TECHNIQUE:     

Volumetric scanning was performed using a multi-row detector CT scanner.  The data was post processed
 with a variety of visualization algorithms including full volume maximum intensity projection, multi
-planar sliding thin slab reformation, curved planar reformation, and surface rendering techniques.  
Using automated exposure control and adjustment of the mA and/or kV according to patient size, radiat
ion dose was kept as low as reasonably achievable to obtain optimal diagnostic quality images.   DICO
M format image data is available electronically for review and comparison.  

 

FINDINGS:     

 

AORTA:     

Moderate distal aortic calcifications without significant flow-limiting stenosis.

 

VISCERAL ARTERIES:     

Single bilateral renal arteries without significant stenosis. Moderate stenosis of the celiac origin.
 SMA is patent. JOHN is patent.

 

RIGHT LEG:

 

INFLOW:     

Occlusion of the proximal right common iliac artery just beyond the origin. Reconstitution of the rig
ht common femoral artery via inferior epigastric and circumflex collaterals. Heavily calcified distal
 common femoral artery at the bifurcation with resultant severe distal stenosis.

 

OUTFLOW:     

Critical stenosis of the profunda origin. Severe stenosis versus focal occlusion of the SFA origin se
condary to heavy calcified plaque. SFA is diffusely diseased in the mid to distal thigh with multiple
 tandem severe stenoses particularly at the adductor canal. Popliteal artery is patent.

 

RUNOFF:     

Anterior tibial artery is the dominant runoff vessel. The appear truncated heavily diseased and likel
y occluded distally. There is reconstitution of the posterior to the artery at the origin which exten
ds to the plantar arch. Peroneal artery is occluded.

 

LEFT LEG:     

 

INFLOW:     

Heavily calcified common iliac artery with long segment oderate stenosis. Internal iliac is patent bu
t diffusely diseased. Moderate stenosis of the proximal external iliac artery secondary to mixed plaq
ue. Bulky calcifications in the distal common femoral artery with resultant tandem moderate stenoses.


 

OUTFLOW:     

Diffusely diseased profunda with severe stenosis at the origin. Diffusely diseased SFA with short seg
ment occlusion in the distal thigh near the adductor canal. Popliteal artery is patent.

 

RUNOFF:     

Anterior tibial artery is occluded just beyond the origin. Tibioperoneal trunk is patent. There is mo
derate to severe focal stenosis of the posterior tibial artery origin secondary to eccentric plaque. 
Posterior tibial artery extends to the plantar arch.

 

GENERAL FINDINGS:

Visualized lung bases are clear. Evaluation of the abdominal viscera is limited due to arterial phase
 technique. Liver, spleen, adrenal glands, gallbladder, and pancreas are grossly unremarkable. Kidney
s demonstrate symmetrical enhancement without evidence for radiopaque renal calculi or hydronephrosis
. No significant renal mass. The bowel appears unremarkable without evidence for obstruction. No sign
ificant free fluid or drainable fluid collection.

 

Bladder is severely distended. Prostate and seminal vesicles are within normal limits. Degenerative s
pondylosis of the lumbar spine.

 

 

CONCLUSION:     

1. Bladder is severely distended which may reflect bladder outlet obstruction or neurogenic bladder. 
Patient may benefit from Roman decompression.

2. No significant aortic stenosis.

3. Occluded right common iliac origin with reconstitution of the common femoral artery.

4. Moderate long segment stenosis of the left common iliac artery and proximal left external iliac ar
cristian.

5. Heavily calcified common femoral bifurcations bilaterally with diffusely diseased profunda.

6. Diffusely diseased bilateral SFA with multiple severe stenoses of the right SFA in the mid to dist
al thigh and short segment occlusion of the left SFA at the adductor canal.

7. Limited two-vessel runoff bilaterally, as above. 

 

 

 Yvan Sanon MD on May 20, 2018 at 20:54           

Board Certified Radiologist.

 This report was verified electronically.

## 2018-05-20 NOTE — MB
cc:

Orly Andrade MD

****

 

 

DATE:

05/20/2018

 

REASON FOR CONSULTATION:

Ischemia of both legs, coronary artery disease, hypertension, 

peripheral vascular disease and incipient gangrene of the left toe.

 

HISTORY OF PRESENT ILLNESS:

This unfortunate 62-year-old black male with multiple medical problems

presents to the hospital with several weeks of left foot pain and 

swelling over the left hallux.  The patient states that this is 

getting worse for the last few days and some foul drainage from it is 

noted.  Questions arise about his vascular status and hence the 

consultation.

 

PAST MEDICAL HISTORY:

Hypertension, hyperlipidemia, stroke with left-sided hemiparesis, 

coronary artery disease.

 

PAST SURGICAL HISTORY:

Coronary artery angioplasty and stenting.

 

MEDICATIONS:

Can be found in the record.

 

SOCIAL HISTORY:

The patient is home.  He smoked about a pack a day most of his adult 

life.  Continues to smoke at this time, according to him; however, he 

tries to keep that way from the caretaker.  In addition, it should be 

noted that the patient is not mobile outside home.  He can move himself 

from bed to chair and from chair to a wheelchair and walks several steps 

with a walker; however, has not walked outside, according to himself, for 

about 3 years.

 

PHYSICAL EXAMINATION:

GENERAL:  Reveals pleasant 62-year-old.

HEENT:  Normocephalic.  No trauma the head.  Pupils are equal, 

reactive.  Extraocular muscles intact.

NECK:  Supple.  Bilateral carotid pulses.  No bruits.

CHEST:  Bilateral breath sounds, decreased over both lung fields, 

consistent with moderate degree of COPD and moderate degree of 

pulmonary cachexia with chest wall musculature loss.

HEART:  Regular rate and rhythm.

ABDOMEN:  Soft, patulous.  No rebound, no guarding, no masses.

EXTREMITIES:  The patient has weak palpable left femoral pulse, no palpable 
right femoral pulse and

also very calcified vessels, which feel like lead to pipes in the 

groin.  Distal pulses are not palpable; however, I can Doppler 

bilateral weak popliteal pulses and then left posterior tibial, and right

dorsalis pedis pulse.  The patient indeed has a

draining ulcer of the left hallux at the metatarsophalangeal junction 

and some white milky white fluid is escaping.

NEUROLOGIC:  The patient is impaired due to previous stroke.  He has a

left-sided hemiparesis, although moves his arms and legs.

 

IMPRESSION AND RECOMMENDATION:

I reviewed laboratory and diagnostic procedures.  The patient has a 

significant degree of vascular disease based on exam.  He has never 

had a CTA with runoff, but his segmental Doppler confirms the same 

with ABIs which are critically decreased.  In my opinion patient probably 

has a very significant inflow disease especially on the right and outflow 

disease on both sides probably just about equal with likely SFA occlusions 

and one vessel runoff to the foot on each side. CTA with runoff is ordered.  

We will see what the patient has and how we can  help him.  Obviously, 

everything we do has to be within the reason  what the patient can tolerate 

and be tailored specifically to his care.

 

Thank you very much for referral.  I will continue to follow the 

patient with you.

 

 

__________________________________

MD LITO Dougherty/CRYSTAL

D: 05/20/2018, 02:59 PM

T: 05/20/2018, 03:36 PM

Visit #: [id number]

Job #: [id number]

SHAAN

## 2018-05-20 NOTE — PD.CAR.PN
CVT Progress Note


Subjective/Hospital Course:


Patient seen


Full consult dictated


CTA with runoff ordered and pending


Thanks


J


Objective:





Vital Signs








  Date Time  Temp Pulse Resp B/P (MAP) Pulse Ox O2 Delivery O2 Flow Rate FiO2


 


5/20/18 08:56      Room Air  


 


5/20/18 08:00 98.6 67 20 113/64 (80) 98   


 


5/20/18 04:00 99.3 93 19 109/68 (82) 97   


 


5/20/18 00:01 99.6 88 19 111/67 (82) 97   


 


5/20/18 00:00      Room Air  


 


5/19/18 20:18 99.4 92 18 105/59 (74) 95   


 


5/19/18 20:01 99.7 80 18 96/69 (78) 98   


 


5/19/18 15:30 98.0 105 16 95/51 (66) 98   








Labs:





Laboratory Tests








Test


  5/20/18


04:16


 


White Blood Count


  16.0 TH/MM3


(4.0-11.0)


 


Red Blood Count


  4.80 MIL/MM3


(4.50-5.90)


 


Hemoglobin


  11.7 GM/DL


(13.0-17.0)


 


Hematocrit


  35.5 %


(39.0-51.0)


 


Mean Corpuscular Volume


  73.8 FL


(80.0-100.0)


 


Mean Corpuscular Hemoglobin


  24.4 PG


(27.0-34.0)


 


Mean Corpuscular Hemoglobin


Concent 33.0 %


(32.0-36.0)


 


Red Cell Distribution Width


  16.7 %


(11.6-17.2)


 


Platelet Count


  162 TH/MM3


(150-450)


 


Mean Platelet Volume


  8.2 FL


(7.0-11.0)


 


Neutrophils (%) (Auto)


  73.2 %


(16.0-70.0)


 


Lymphocytes (%) (Auto)


  15.0 %


(9.0-44.0)


 


Monocytes (%) (Auto)


  11.2 %


(0.0-8.0)


 


Eosinophils (%) (Auto)


  0.2 %


(0.0-4.0)


 


Basophils (%) (Auto)


  0.4 %


(0.0-2.0)


 


Neutrophils # (Auto)


  11.7 TH/MM3


(1.8-7.7)


 


Lymphocytes # (Auto)


  2.4 TH/MM3


(1.0-4.8)


 


Monocytes # (Auto)


  1.8 TH/MM3


(0-0.9)


 


Eosinophils # (Auto)


  0.0 TH/MM3


(0-0.4)


 


Basophils # (Auto)


  0.1 TH/MM3


(0-0.2)


 


CBC Comment DIFF FINAL 


 


Differential Comment  


 


Blood Urea Nitrogen


  16 MG/DL


(7-18)


 


Creatinine


  1.08 MG/DL


(0.60-1.30)


 


Random Glucose


  87 MG/DL


()


 


Total Protein


  7.1 GM/DL


(6.4-8.2)


 


Albumin


  3.6 GM/DL


(3.4-5.0)


 


Calcium Level


  8.5 MG/DL


(8.5-10.1)


 


Alkaline Phosphatase


  65 U/L


()


 


Aspartate Amino Transf


(AST/SGOT) 19 U/L (15-37) 


 


 


Alanine Aminotransferase


(ALT/SGPT) 15 U/L (12-78) 


 


 


Total Bilirubin


  1.1 MG/DL


(0.2-1.0)


 


Sodium Level


  140 MEQ/L


(136-145)


 


Potassium Level


  3.9 MEQ/L


(3.5-5.1)


 


Chloride Level


  105 MEQ/L


()


 


Carbon Dioxide Level


  25.5 MEQ/L


(21.0-32.0)


 


Anion Gap


  10 MEQ/L


(5-15)


 


Estimat Glomerular Filtration


Rate 84 ML/MIN


(>89)








Result Diagram:  


5/20/18 0416                                                                   

             5/20/18 0416














Orly Andrade MD May 20, 2018 15:04

## 2018-05-21 VITALS
RESPIRATION RATE: 16 BRPM | OXYGEN SATURATION: 96 % | DIASTOLIC BLOOD PRESSURE: 42 MMHG | HEART RATE: 91 BPM | SYSTOLIC BLOOD PRESSURE: 109 MMHG | TEMPERATURE: 99.2 F

## 2018-05-21 VITALS
SYSTOLIC BLOOD PRESSURE: 118 MMHG | RESPIRATION RATE: 15 BRPM | OXYGEN SATURATION: 99 % | TEMPERATURE: 98 F | DIASTOLIC BLOOD PRESSURE: 74 MMHG | HEART RATE: 66 BPM

## 2018-05-21 VITALS
SYSTOLIC BLOOD PRESSURE: 93 MMHG | HEART RATE: 98 BPM | TEMPERATURE: 99.4 F | RESPIRATION RATE: 18 BRPM | DIASTOLIC BLOOD PRESSURE: 58 MMHG | OXYGEN SATURATION: 98 %

## 2018-05-21 VITALS
SYSTOLIC BLOOD PRESSURE: 129 MMHG | RESPIRATION RATE: 16 BRPM | TEMPERATURE: 98.9 F | OXYGEN SATURATION: 99 % | HEART RATE: 66 BPM | DIASTOLIC BLOOD PRESSURE: 61 MMHG

## 2018-05-21 VITALS
TEMPERATURE: 98.5 F | DIASTOLIC BLOOD PRESSURE: 71 MMHG | RESPIRATION RATE: 18 BRPM | OXYGEN SATURATION: 100 % | HEART RATE: 76 BPM | SYSTOLIC BLOOD PRESSURE: 130 MMHG

## 2018-05-21 VITALS
HEART RATE: 90 BPM | OXYGEN SATURATION: 98 % | SYSTOLIC BLOOD PRESSURE: 115 MMHG | TEMPERATURE: 98.6 F | DIASTOLIC BLOOD PRESSURE: 61 MMHG | RESPIRATION RATE: 16 BRPM

## 2018-05-21 VITALS
RESPIRATION RATE: 20 BRPM | TEMPERATURE: 98.9 F | HEART RATE: 98 BPM | DIASTOLIC BLOOD PRESSURE: 60 MMHG | OXYGEN SATURATION: 100 % | SYSTOLIC BLOOD PRESSURE: 126 MMHG

## 2018-05-21 LAB
ALBUMIN SERPL-MCNC: 3.4 GM/DL (ref 3.4–5)
ALP SERPL-CCNC: 63 U/L (ref 45–117)
ALT SERPL-CCNC: 18 U/L (ref 12–78)
AST SERPL-CCNC: 19 U/L (ref 15–37)
BASOPHILS # BLD AUTO: 0.2 TH/MM3 (ref 0–0.2)
BASOPHILS NFR BLD: 1.1 % (ref 0–2)
BILIRUB SERPL-MCNC: 0.4 MG/DL (ref 0.2–1)
BUN SERPL-MCNC: 13 MG/DL (ref 7–18)
CALCIUM SERPL-MCNC: 8.8 MG/DL (ref 8.5–10.1)
CHLORIDE SERPL-SCNC: 104 MEQ/L (ref 98–107)
CREAT SERPL-MCNC: 1.11 MG/DL (ref 0.6–1.3)
EOSINOPHIL # BLD: 0.1 TH/MM3 (ref 0–0.4)
EOSINOPHIL NFR BLD: 0.5 % (ref 0–4)
ERYTHROCYTE [DISTWIDTH] IN BLOOD BY AUTOMATED COUNT: 16.5 % (ref 11.6–17.2)
GFR SERPLBLD BASED ON 1.73 SQ M-ARVRAT: 81 ML/MIN (ref 89–?)
GLUCOSE SERPL-MCNC: 100 MG/DL (ref 74–106)
HBA1C MFR BLD: 5.8 % (ref 4.3–6)
HCO3 BLD-SCNC: 27 MEQ/L (ref 21–32)
HCT VFR BLD CALC: 36.6 % (ref 39–51)
HGB BLD-MCNC: 12.2 GM/DL (ref 13–17)
LYMPHOCYTES # BLD AUTO: 2.5 TH/MM3 (ref 1–4.8)
LYMPHOCYTES NFR BLD AUTO: 17.2 % (ref 9–44)
MAGNESIUM SERPL-MCNC: 2.1 MG/DL (ref 1.5–2.5)
MCH RBC QN AUTO: 24.3 PG (ref 27–34)
MCHC RBC AUTO-ENTMCNC: 33.2 % (ref 32–36)
MCV RBC AUTO: 73.2 FL (ref 80–100)
MONOCYTE #: 1.6 TH/MM3 (ref 0–0.9)
MONOCYTES NFR BLD: 10.5 % (ref 0–8)
NEUTROPHILS # BLD AUTO: 10.5 TH/MM3 (ref 1.8–7.7)
NEUTROPHILS NFR BLD AUTO: 70.7 % (ref 16–70)
PHOSPHATE SERPL-MCNC: 3.9 MG/DL (ref 2.5–4.9)
PLATELET # BLD: 172 TH/MM3 (ref 150–450)
PMV BLD AUTO: 8 FL (ref 7–11)
PROT SERPL-MCNC: 7.3 GM/DL (ref 6.4–8.2)
RBC # BLD AUTO: 5 MIL/MM3 (ref 4.5–5.9)
SODIUM SERPL-SCNC: 140 MEQ/L (ref 136–145)
T4 FREE SERPL-MCNC: 1.15 NG/DL (ref 0.76–1.46)
WBC # BLD AUTO: 14.8 TH/MM3 (ref 4–11)

## 2018-05-21 RX ADMIN — HYDROCODONE BITARTRATE AND ACETAMINOPHEN PRN TAB: 5; 325 TABLET ORAL at 22:19

## 2018-05-21 RX ADMIN — CEFAZOLIN SODIUM IN SODIUM CHLORIDE 0.9% IV SOLN 2 GM/100ML SCH MLS/HR: 2-0.9/1 SOLUTION at 20:16

## 2018-05-21 RX ADMIN — Medication SCH ML: at 20:17

## 2018-05-21 RX ADMIN — HYDROCODONE BITARTRATE AND ACETAMINOPHEN PRN TAB: 5; 325 TABLET ORAL at 01:00

## 2018-05-21 RX ADMIN — Medication SCH ML: at 08:17

## 2018-05-21 RX ADMIN — CEFAZOLIN SODIUM IN SODIUM CHLORIDE 0.9% IV SOLN 2 GM/100ML SCH MLS/HR: 2-0.9/1 SOLUTION at 05:17

## 2018-05-21 RX ADMIN — STANDARDIZED SENNA CONCENTRATE AND DOCUSATE SODIUM SCH TAB: 8.6; 5 TABLET, FILM COATED ORAL at 08:16

## 2018-05-21 RX ADMIN — CEFAZOLIN SODIUM IN SODIUM CHLORIDE 0.9% IV SOLN 2 GM/100ML SCH MLS/HR: 2-0.9/1 SOLUTION at 12:57

## 2018-05-21 NOTE — HHI.PR
Subjective


Remarks


in no acute distress.


pain is controlled.


Tmax 100.5.


d/w the RN and no acute issues over night.





Objective


Vitals





Vital Signs








  Date Time  Temp Pulse Resp B/P (MAP) Pulse Ox O2 Delivery O2 Flow Rate FiO2


 


5/21/18 04:00 98.0 66 15 118/74 (89) 99   


 


5/21/18 00:00 98.6 90 16 115/61 (79) 98   


 


5/20/18 20:00 100.0 81 17 97/68 (78) 98   


 


5/20/18 20:00      Room Air  


 


5/20/18 16:56    108/67 (81)    


 


5/20/18 16:00 100.5 75 20 108/67 (81) 97   


 


5/20/18 12:00 98.7 94 20 83/52 (62) 97   


 


5/20/18 08:56      Room Air  














I/O      


 


 5/20/18 5/20/18 5/20/18 5/21/18 5/21/18 5/21/18





 07:00 15:00 23:00 07:00 15:00 23:00


 


Intake Total   600 ml 420 ml  


 


Balance   600 ml 420 ml  


 


      


 


Intake Oral   600 ml 420 ml  


 


# Voids 3  2 2  


 


# Bowel Movements   0 3  








Result Diagram:  


5/21/18 0311                                                                   

             5/21/18 0311





Imaging





Last Impressions








Aorta w/Runoff CTA 5/20/18 0000 Signed





Impressions: 





 Service Date/Time:  Ron, May 20, 2018 17:02 - CONCLUSION:  1. Bladder is 





 severely distended which may reflect bladder outlet obstruction or neurogenic 





 bladder. Patient may benefit from Roman decompression. 2. No significant 

aortic 





 stenosis. 3. Occluded right common iliac origin with reconstitution of the 





 common femoral artery. 4. Moderate long segment stenosis of the left common 





 iliac artery and proximal left external iliac artery. 5. Heavily calcified 





 common femoral bifurcations bilaterally with diffusely diseased profunda. 6. 





 Diffusely diseased bilateral SFA with multiple severe stenoses of the right 

SFA 





 in the mid to distal thigh and short segment occlusion of the left SFA at the 





 adductor canal. 7. Limited two-vessel runoff bilaterally, as above.     

Yvan Sanon MD 


 


Foot X-Ray 5/18/18 0000 Signed





Impressions: 





 Service Date/Time:  Friday, May 18, 2018 21:46 - CONCLUSION:  Suspected 

chronic 





 deformity of the hindfoot.     Iván Shields MD 


 


Foot MRI 5/18/18 0000 Signed





Impressions: 





 Service Date/Time:  Saturday, May 19, 2018 00:12 - CONCLUSION:  1. No definite 





 evidence for osteomyelitis. No discrete abscess. 2. Unusual multifocal 





 subchondral marrow edema throughout the left foot and ankle as above. The 





 finding is nonspecific. It can be related to immobilization of the foot or 





 limited use of the foot.     Sumit Arteaga MD 








Objective Remarks


GENERAL: This is a well-nourished, well-developed patient, in no apparent 

distress.


CARDIOVASCULAR: Regular rate and regular rhythm without murmurs, gallops, or 

rubs. 


RESPIRATORY: Clear to auscultation. Breath sounds equal bilaterally. No wheezes

, rales, or rhonchi.  


GASTROINTESTINAL: Abdomen soft, non-tender, nondistended. 


Normal, active bowel sounds


MUSCULOSKELETAL: left foot is swollen.


NEURO:  Alert & Oriented x4 to person, place, time, situation.  Moves all ext x4


Medications and IVs





Inpatient Medications


Acetaminophen (Tylenol) 650 mg Q6H  PRN PO FEVER/PAIN SCALE 1 TO 2;  Start 5/19/ 18 at 02:00


Acetaminophen/ Hydrocodone Bitart (Norco  5-325 Mg) 1 tab Q4H  PRN PO PAIN 

SCALE 3 TO 5 Last administered on 5/21/18at 01:00;  Start 5/19/18 at 02:00


Bisacodyl (Dulcolax Supp) 10 mg DAILY  PRN RECTAL SEVERE CONSITIPATION;  Start 5 /19/18 at 02:00


Cefazolin/Sodium Chloride 100 ml @  200 mls/hr Q8H IV  Last administered on 5/21 /18at 05:17;  Start 5/20/18 at 20:00


Clindamycin/ Sodium Chloride 50 ml @  100 mls/hr Q8H IV  Last administered on 5/ 19/18at 05:50;  Start 5/19/18 at 06:00;  Stop 5/19/18 at 10:37;  Status DC


Lactulose (Lactulose Liq) 30 ml DAILY  PRN PO SEVERE CONSITIPATION;  Start 5/19/ 18 at 02:00


Magnesium Hydroxide (Milk Of Magnesia Liq) 30 ml Q12H  PRN PO Mild constipation

;  Start 5/19/18 at 02:00


Metoclopramide HCl (Reglan Inj) 5 mg Q6H  PRN IV PUSH NAUSEA OR VOMITING;  

Start 5/19/18 at 02:00


Miscellaneous Information ([facility] Ordered Lab Info) SPECIFIC LAB TO BE 

... ONCE  ONCE .XX ;  Start 5/22/18 at 11:45;  Stop 5/22/18 at 11:45;  

Status DC


Morphine Sulfate (Morphine Inj) 2 mg Q3H  PRN IV PUSH Pain 6-10 Last 

administered on 5/19/18at 12:07;  Start 5/19/18 at 02:15


Pharmacy Profile Note 0 ml @ 0 mls/hr UNSCH OTHER ;  Start 5/19/18 at 10:45;  

Stop 5/20/18 at 19:38;  Status DC


Senna/Docusate Sodium (Yeimi-Colace) 1 tab BID PO ;  Start 5/19/18 at 09:00


Sennosides (Senokot) 17.2 mg Q12H  PRN PO Moderate constipation;  Start 5/19/18 

at 02:00


Sodium Chloride (NS Flush) 2 ml BID IV FLUSH  Last administered on 5/21/18at 08:

17;  Start 5/19/18 at 09:00


Vancomycin HCl 1000 mg/Sodium Chloride 250 ml @  250 mls/hr Q24H IV  Last 

administered on 5/20/18at 11:48;  Start 5/19/18 at 12:00;  Stop 5/20/18 at 19:38

;  Status DC





A/P


Problem List:  


(1) Cellulitis of left foot


ICD Code:  L03.116 - Cellulitis of left lower limb


Status:  Acute


(2) Intractable pain


ICD Code:  R52 - Pain, unspecified


Status:  Acute


Assessment and Plan


Sepsis with Left Foot Cellulitis: Concern for septic joint vs osteomyelitis. 

Symptoms x1 month with worsening wound at left medial MTP and diffuse pain/

warmth/edema. WBC 14.5K, tachycardic, with suspected source-cellulitis vs 

septic joint. 


   -X-ray reviewed, shows suspected chronic deformity hindfoot


   -MRI Foot reviewed, no definite osteomyelitis, however unusual marrow edema 

throughout left foot and ankle


   -initial Blood cultures with one bottle positive for staph Aureus


   -Wound culture with MSSA


   -podiatry consult appreciated and no plan for surgical intervention at this 

time.


   -will follow the repeated blood cultures and echo.


   -ID following.


   


PVD


   - CTA runoff as noted above.


   -awaiting vascular surgery evaluation/recommendations.











All other medical conditions stable, continue home meds as appropriate. 





DVT Prophylaxis:  Mechanical contraindication secondary to wound/cellulitis. 

Chemoprophylaxis on hold with possible upcoming procedure











Enmanuel Dutta MD May 21, 2018 08:51

## 2018-05-21 NOTE — PD.CAR.PN
CVT Progress Note


Subjective/Hospital Course:


Patient seen


Full consult dictated


CTA with runoff ordered and pending


Thanks


LORI


5/21/2018


CTA with a runoff was performed and reviewed it.  CTA confirms the clinical 

findings and gives more detail as to the nature of patient's disease


Patient has severe peripheral vascular disease with inflow and outflow 

occlusive problems.


Right common iliac artery is occluded and then reconstitutes as external iliac 

artery to run into the groin.


Right common iliac profunda and SFA are occluded proximally and then there are 

several narrowings distally in the SFA


Patient has one-vessel runoff to the foot on the right the anterior tibial 

artery


Left external iliac is significantly stenosed but patent and then in the groin 

there is common femoral deep femoral and SFA near occlusion


Distal SFA is occluded in the adductor canal and patient has again one-vessel 

runoff to the foot via a posterior tibial


Summarized patient has severe peripheral vascular disease bilateral and in a 

deal case scenario patient with undergo aortobifemoral bypass with femoral 

endarterectomies and possible angioplasties of SFA.


Patient is clearly not a candidate for a surgical procedure of this size and 

extent.


Therefore patient will be taken to the operating room for a bilateral femoral 

endarterectomy and femorofemoral bypass with left external iliac balloon 

angioplasty and stent and left SFA balloon angioplasty


We will take patient to the operating room on Wednesday and I have explained 

the risks and benefits to the patient.


Objective:





Vital Signs








  Date Time  Temp Pulse Resp B/P (MAP) Pulse Ox O2 Delivery O2 Flow Rate FiO2


 


5/21/18 12:30 98.5 76 18 130/71 (90) 100   


 


5/21/18 08:20 98.9 98 20 126/60 (82) 100   


 


5/21/18 08:00      Room Air  


 


5/21/18 04:00 98.0 66 15 118/74 (89) 99   


 


5/21/18 00:00 98.6 90 16 115/61 (79) 98   


 


5/20/18 20:00 100.0 81 17 97/68 (78) 98   


 


5/20/18 20:00      Room Air  


 


5/20/18 16:56    108/67 (81)    


 


5/20/18 16:00 100.5 75 20 108/67 (81) 97   








Result Diagram:  


5/21/18 0311                                                                   

             5/21/18 0311














Orly Andrade MD May 21, 2018 15:13

## 2018-05-22 VITALS
HEART RATE: 81 BPM | TEMPERATURE: 98.8 F | RESPIRATION RATE: 20 BRPM | DIASTOLIC BLOOD PRESSURE: 56 MMHG | SYSTOLIC BLOOD PRESSURE: 103 MMHG | OXYGEN SATURATION: 97 %

## 2018-05-22 VITALS
HEART RATE: 86 BPM | RESPIRATION RATE: 20 BRPM | SYSTOLIC BLOOD PRESSURE: 91 MMHG | OXYGEN SATURATION: 98 % | TEMPERATURE: 97.6 F | DIASTOLIC BLOOD PRESSURE: 63 MMHG

## 2018-05-22 VITALS
SYSTOLIC BLOOD PRESSURE: 96 MMHG | TEMPERATURE: 98.6 F | OXYGEN SATURATION: 100 % | RESPIRATION RATE: 16 BRPM | DIASTOLIC BLOOD PRESSURE: 61 MMHG | HEART RATE: 81 BPM

## 2018-05-22 VITALS
RESPIRATION RATE: 16 BRPM | OXYGEN SATURATION: 99 % | DIASTOLIC BLOOD PRESSURE: 73 MMHG | HEART RATE: 113 BPM | TEMPERATURE: 98.5 F | SYSTOLIC BLOOD PRESSURE: 138 MMHG

## 2018-05-22 VITALS
TEMPERATURE: 98 F | HEART RATE: 63 BPM | DIASTOLIC BLOOD PRESSURE: 55 MMHG | OXYGEN SATURATION: 99 % | RESPIRATION RATE: 20 BRPM | SYSTOLIC BLOOD PRESSURE: 98 MMHG

## 2018-05-22 VITALS
DIASTOLIC BLOOD PRESSURE: 64 MMHG | SYSTOLIC BLOOD PRESSURE: 117 MMHG | RESPIRATION RATE: 16 BRPM | HEART RATE: 75 BPM | TEMPERATURE: 99 F | OXYGEN SATURATION: 98 %

## 2018-05-22 RX ADMIN — CEFAZOLIN SODIUM IN SODIUM CHLORIDE 0.9% IV SOLN 2 GM/100ML SCH MLS/HR: 2-0.9/1 SOLUTION at 12:04

## 2018-05-22 RX ADMIN — CEFAZOLIN SODIUM IN SODIUM CHLORIDE 0.9% IV SOLN 2 GM/100ML SCH MLS/HR: 2-0.9/1 SOLUTION at 20:46

## 2018-05-22 RX ADMIN — Medication SCH ML: at 07:00

## 2018-05-22 RX ADMIN — Medication SCH ML: at 20:46

## 2018-05-22 RX ADMIN — CEFAZOLIN SODIUM IN SODIUM CHLORIDE 0.9% IV SOLN 2 GM/100ML SCH MLS/HR: 2-0.9/1 SOLUTION at 04:00

## 2018-05-22 NOTE — HHI.IDPN
Subjective


Subjective


Remarks


Pt was found to have right common iliac occlusion and reconstitution distal to 

it while on the left side which is symptomatic patient has a severe stenosis 

throughout the short segment occlusion of SFA


He will be taken to  operating room tomorrow for femoral endarterectomy and 

common external iliac balloon angioplasty stents and SFA angioplasty


MSSA in blood, L foot wound


Repeat blood clx are negative


Antibiotics


cefazoline


Allergies:  


Coded Allergies:  


     No Known Allergies (Verified  Allergy, Unknown, 3/5/18)





Objective


.





Vital Signs








  Date Time  Temp Pulse Resp B/P (MAP) Pulse Ox O2 Delivery O2 Flow Rate FiO2


 


5/22/18 16:05 98.8 81 20 103/56 (72) 97   


 


5/22/18 12:22 97.6 86 20 91/63 (72) 98   


 


5/22/18 08:20 98.0 63 20 98/55 (69) 99   


 


5/22/18 04:15 99.0 75 16 117/64 (81) 98   


 


5/21/18 23:50 98.9 66 16 129/61 (83) 99   


 


5/21/18 23:33   18     


 


5/21/18 20:32 99.2 91 16 109/42 (64) 96   


 


5/21/18 20:00      Room Air  








.





Laboratory Tests








Test


  5/21/18


03:11


 


White Blood Count 14.8 TH/MM3 


 


Red Blood Count 5.00 MIL/MM3 


 


Hemoglobin 12.2 GM/DL 


 


Hematocrit 36.6 % 


 


Mean Corpuscular Volume 73.2 FL 


 


Mean Corpuscular Hemoglobin 24.3 PG 


 


Mean Corpuscular Hemoglobin


Concent 33.2 % 


 


 


Red Cell Distribution Width 16.5 % 


 


Platelet Count 172 TH/MM3 


 


Mean Platelet Volume 8.0 FL 


 


Neutrophils (%) (Auto) 70.7 % 


 


Lymphocytes (%) (Auto) 17.2 % 


 


Monocytes (%) (Auto) 10.5 % 


 


Eosinophils (%) (Auto) 0.5 % 


 


Basophils (%) (Auto) 1.1 % 


 


Neutrophils # (Auto) 10.5 TH/MM3 


 


Lymphocytes # (Auto) 2.5 TH/MM3 


 


Monocytes # (Auto) 1.6 TH/MM3 


 


Eosinophils # (Auto) 0.1 TH/MM3 


 


Basophils # (Auto) 0.2 TH/MM3 


 


CBC Comment DIFF FINAL 


 


Differential Comment  








Laboratory Tests








Test


  5/21/18


03:11


 


Blood Urea Nitrogen 13 MG/DL 


 


Creatinine 1.11 MG/DL 


 


Random Glucose 100 MG/DL 


 


Total Protein 7.3 GM/DL 


 


Albumin 3.4 GM/DL 


 


Calcium Level 8.8 MG/DL 


 


Phosphorus Level 3.9 MG/DL 


 


Magnesium Level 2.1 MG/DL 


 


Alkaline Phosphatase 63 U/L 


 


Aspartate Amino Transf


(AST/SGOT) 19 U/L 


 


 


Alanine Aminotransferase


(ALT/SGPT) 18 U/L 


 


 


Total Bilirubin 0.4 MG/DL 


 


Sodium Level 140 MEQ/L 


 


Potassium Level 3.9 MEQ/L 


 


Chloride Level 104 MEQ/L 


 


Carbon Dioxide Level 27.0 MEQ/L 


 


Anion Gap 9 MEQ/L 


 


Estimat Glomerular Filtration


Rate 81 ML/MIN 


 


 


Hemoglobin A1c 5.8 % 


 


Lactic Acid Level 0.9 mmol/L 


 


Free Thyroxine 1.15 NG/DL 


 


Thyroid Stimulating Hormone


3rd Gen 1.020 uIU/ML 


 








Microbiology








 Date/Time


Source Procedure


Growth Status


 


 


 5/21/18 03:28


Blood Peripheral Aerobic Blood Culture - Preliminary


NO GROWTH IN 1 DAY Resulted


 


 5/21/18 03:28


Blood Peripheral Anaerobic Blood Culture - Preliminary


NO GROWTH IN 1 DAY Resulted





 5/21/18 03:11


Blood Peripheral Aerobic Blood Culture - Preliminary


NO GROWTH IN 1 DAY Resulted


 


 5/21/18 03:11


Blood Peripheral Anaerobic Blood Culture - Preliminary


NO GROWTH IN 1 DAY Resulted








Imaging





Last Impressions








Aorta w/Runoff CTA 5/20/18 0000 Signed





Impressions: 





 Service Date/Time:  Ron, May 20, 2018 17:02 - CONCLUSION:  1. Bladder is 





 severely distended which may reflect bladder outlet obstruction or neurogenic 





 bladder. Patient may benefit from Roman decompression. 2. No significant 

aortic 





 stenosis. 3. Occluded right common iliac origin with reconstitution of the 





 common femoral artery. 4. Moderate long segment stenosis of the left common 





 iliac artery and proximal left external iliac artery. 5. Heavily calcified 





 common femoral bifurcations bilaterally with diffusely diseased profunda. 6. 





 Diffusely diseased bilateral SFA with multiple severe stenoses of the right 

SFA 





 in the mid to distal thigh and short segment occlusion of the left SFA at the 





 adductor canal. 7. Limited two-vessel runoff bilaterally, as above.     

Yvan Sanon MD 


 


Foot X-Ray 5/18/18 0000 Signed





Impressions: 





 Service Date/Time:  Friday, May 18, 2018 21:46 - CONCLUSION:  Suspected 

chronic 





 deformity of the hindfoot.     Iván Shields MD 


 


Foot MRI 5/18/18 0000 Signed





Impressions: 





 Service Date/Time:  Saturday, May 19, 2018 00:12 - CONCLUSION:  1. No definite 





 evidence for osteomyelitis. No discrete abscess. 2. Unusual multifocal 





 subchondral marrow edema throughout the left foot and ankle as above. The 





 finding is nonspecific. It can be related to immobilization of the foot or 





 limited use of the foot.     Sumit Arteaga MD 








Physical Exam


CONSTITUTIONAL/GENERAL: This is a thin elderly male  patient, in no apparent 

distress.


TUBES/LINES/DRAINS:


SKIN: No jaundice, rashes, or lesions. Ecchymoses on upper extremities. No 

wounds seen anteriorly. Skin temperature appropriate. Not diaphoretic. 


 CARDIOVASCULAR: Regular rate and rhythm without murmurs, gallops, or rubs. No 

JVD. Peripheral pulses symmetric.


RESPIRATORY/CHEST: Symmetric, unlabored respirations. Clear to auscultation. 

Breath sounds equal bilaterally. No wheezes, rales, or rhonchi.  


GASTROINTESTINAL: Abdomen soft, non-tender, nondistended. No hepato-splenomegaly

, or palpable masses. No guarding. Bowel sounds present.


GENITOURINARY: Without palpable bladder distension.  


MUSCULOSKELETAL: Extremities without clubbing, cyanosis, 


L foot with  prominent edema of great toe and 1 st MT area, dark discoloratiom, 

prominent tenderness to palpatio and open wound with no active draiange. 

Forefoot is milfldly edematous, but no ascending cellulitis, lymphangitis


Very tender.


Essentailly unchanged


Pedal pulses non palpable


No joint tenderness or effusion noted. No calf tenderness. No mottling or 

clubbing.


 NEUROLOGICAL: Awake and alert. Baseline L hemiparesis . Follows commands. 

Confused. Moves all extremities.


PSYCHIATRIC: No obvious anxiety/depression. no apparent hallucinations or other 

psychotic thought process.





Assessment & Plan


Remarks


MSSA infected 1st MT wound 


? gout 


MSSA sepsis - 2/2 foot infection


Underlying moderate to severe PVD


? Bladder outlet obstruction





   cont  cefaozlin


   fu repeat BC


   2 D echo


    post void residuals











Charu Stanley MD May 22, 2018 19:01

## 2018-05-22 NOTE — HHI.PR
Subjective


Remarks


in no acute distress.


denies pain.


no fever.


no new complaints.





Objective


Vitals





Vital Signs








  Date Time  Temp Pulse Resp B/P (MAP) Pulse Ox O2 Delivery O2 Flow Rate FiO2


 


5/22/18 08:20 98.0 63 20 98/55 (69) 99   


 


5/22/18 04:15 99.0 75 16 117/64 (81) 98   


 


5/21/18 23:50 98.9 66 16 129/61 (83) 99   


 


5/21/18 23:33   18     


 


5/21/18 20:32 99.2 91 16 109/42 (64) 96   


 


5/21/18 20:00      Room Air  


 


5/21/18 16:19 99.4 98 18 93/58 (70) 98   


 


5/21/18 12:30 98.5 76 18 130/71 (90) 100   














I/O      


 


 5/21/18 5/21/18 5/21/18 5/22/18 5/22/18 5/22/18





 07:00 15:00 23:00 07:00 15:00 23:00


 


Intake Total 420 ml 100 ml 600 ml 320 ml  


 


Output Total    100 ml  


 


Balance 420 ml 100 ml 600 ml 220 ml  


 


      


 


Intake Oral 420 ml  600 ml 120 ml  


 


IV Total  100 ml  200 ml  


 


Output Urine Total    100 ml  


 


# Voids 2  2   


 


# Bowel Movements 3  2 1  








Result Diagram:  


5/21/18 0311                                                                   

             5/21/18 0311





Imaging





Last Impressions








Aorta w/Runoff CTA 5/20/18 0000 Signed





Impressions: 





 Service Date/Time:  Ron, May 20, 2018 17:02 - CONCLUSION:  1. Bladder is 





 severely distended which may reflect bladder outlet obstruction or neurogenic 





 bladder. Patient may benefit from Roman decompression. 2. No significant 

aortic 





 stenosis. 3. Occluded right common iliac origin with reconstitution of the 





 common femoral artery. 4. Moderate long segment stenosis of the left common 





 iliac artery and proximal left external iliac artery. 5. Heavily calcified 





 common femoral bifurcations bilaterally with diffusely diseased profunda. 6. 





 Diffusely diseased bilateral SFA with multiple severe stenoses of the right 

SFA 





 in the mid to distal thigh and short segment occlusion of the left SFA at the 





 adductor canal. 7. Limited two-vessel runoff bilaterally, as above.     

Yvan Sanon MD 


 


Foot X-Ray 5/18/18 0000 Signed





Impressions: 





 Service Date/Time:  Friday, May 18, 2018 21:46 - CONCLUSION:  Suspected 

chronic 





 deformity of the hindfoot.     Iván Shields MD 


 


Foot MRI 5/18/18 0000 Signed





Impressions: 





 Service Date/Time:  Saturday, May 19, 2018 00:12 - CONCLUSION:  1. No definite 





 evidence for osteomyelitis. No discrete abscess. 2. Unusual multifocal 





 subchondral marrow edema throughout the left foot and ankle as above. The 





 finding is nonspecific. It can be related to immobilization of the foot or 





 limited use of the foot.     Sumit Arteaga MD 








Objective Remarks


GENERAL: This is a well-nourished, well-developed patient, in no apparent 

distress.


CARDIOVASCULAR: Regular rate and regular rhythm without murmurs, gallops, or 

rubs. 


RESPIRATORY: Clear to auscultation. Breath sounds equal bilaterally. No wheezes

, rales, or rhonchi.  


GASTROINTESTINAL: Abdomen soft, non-tender, nondistended. 


Normal, active bowel sounds


MUSCULOSKELETAL: left foot is swollen.


NEURO:  Alert & Oriented x4 to person, place, time, situation.  Moves all ext x4


Medications and IVs





Inpatient Medications


Acetaminophen (Tylenol) 650 mg Q6H  PRN PO FEVER/PAIN SCALE 1 TO 2;  Start 5/19/ 18 at 02:00


Acetaminophen/ Hydrocodone Bitart (Norco  5-325 Mg) 1 tab Q4H  PRN PO PAIN 

SCALE 3 TO 5 Last administered on 5/21/18at 22:19;  Start 5/19/18 at 02:00


Bisacodyl (Dulcolax Supp) 10 mg DAILY  PRN RECTAL SEVERE CONSITIPATION;  Start 5 /19/18 at 02:00


Cefazolin/Sodium Chloride 100 ml @  200 mls/hr Q8H IV  Last administered on 5/22 /18at 04:00;  Start 5/20/18 at 20:00


Clindamycin/ Sodium Chloride 50 ml @  100 mls/hr Q8H IV  Last administered on 5/ 19/18at 05:50;  Start 5/19/18 at 06:00;  Stop 5/19/18 at 10:37;  Status DC


Lactulose (Lactulose Liq) 30 ml DAILY  PRN PO SEVERE CONSITIPATION;  Start 5/19/ 18 at 02:00


Magnesium Hydroxide (Milk Of Magnesia Liq) 30 ml Q12H  PRN PO Mild constipation

;  Start 5/19/18 at 02:00


Metoclopramide HCl (Reglan Inj) 5 mg Q6H  PRN IV PUSH NAUSEA OR VOMITING;  

Start 5/19/18 at 02:00


Miscellaneous Information ([facility] [place] [facility] Ordered Lab Info) SPECIFIC LAB TO BE 

... ONCE  ONCE .XX ;  Start 5/22/18 at 11:45;  Stop 5/22/18 at 11:45;  

Status DC


Morphine Sulfate (Morphine Inj) 2 mg Q3H  PRN IV PUSH Pain 6-10 Last 

administered on 5/19/18at 12:07;  Start 5/19/18 at 02:15


Pharmacy Profile Note 0 ml @ 0 mls/hr UNSCH OTHER ;  Start 5/19/18 at 10:45;  

Stop 5/20/18 at 19:38;  Status DC


Senna/Docusate Sodium (Yeimi-Colace) 1 tab BID PO ;  Start 5/19/18 at 09:00;  

Status Future Hold


Sennosides (Senokot) 17.2 mg Q12H  PRN PO Moderate constipation;  Start 5/19/18 

at 02:00


Sodium Chloride (NS Flush) 2 ml BID IV FLUSH  Last administered on 5/22/18at 07:

00;  Start 5/19/18 at 09:00


Vancomycin HCl 1000 mg/Sodium Chloride 250 ml @  250 mls/hr Q24H IV  Last 

administered on 5/20/18at 11:48;  Start 5/19/18 at 12:00;  Stop 5/20/18 at 19:38

;  Status DC





A/P


Problem List:  


(1) Cellulitis of left foot


ICD Code:  L03.116 - Cellulitis of left lower limb


Status:  Acute


(2) Intractable pain


ICD Code:  R52 - Pain, unspecified


Status:  Acute


Assessment and Plan


Sepsis with Left Foot Cellulitis: Concern for septic joint vs osteomyelitis. 

Symptoms x1 month with worsening wound at left medial MTP and diffuse pain/

warmth/edema. WBC 14.5K, tachycardic, with suspected source-cellulitis vs 

septic joint. 


   -X-ray reviewed, shows suspected chronic deformity hindfoot


   -MRI Foot reviewed, no definite osteomyelitis, however unusual marrow edema 

throughout left foot and ankle


   -initial Blood cultures with one bottle positive for staph Aureus


   -Wound culture with MSSA


   -podiatry consult appreciated and no plan for surgical intervention at this 

time.


   -will follow the repeated blood cultures and echo.


   -ID following.


   


PVD


   - CTA runoff as noted above.


   -vascular surgery f/u appreciated and plan for vascular intervention 

tomorrow.





All other medical conditions stable, continue home meds as appropriate. 





DVT Prophylaxis:  Mechanical contraindication secondary to wound/cellulitis. 

Chemoprophylaxis on hold with  upcoming procedure


Discharge Planning


for vascular intervention tomorrow.











Enmanuel Dutta MD May 22, 2018 11:27

## 2018-05-22 NOTE — EKG
Date Performed: 05/21/2018       Time Performed: 16:12:56

 

PTAGE:      62 years

 

EKG:      Sinus rhythm 

 

 POSSIBLE RIGHT ATRIAL ENLARGEMENT POSSIBLE LEFT ATRIAL ENLARGEMENT POSSIBLE RIGHT VENTRICULAR CONDUC
TION DELAY LEFT ANTERIOR FASCICULAR BLOCK POSSIBLE SEPTAL MYOCARDIAL INFARCTION , OF INDETERMINATE AG
E MODERATE T-WAVE ABNORMALITY, CONSIDER LATERAL ISCHEMIA MODERATE T-WAVE ABNORMALITY, CONSIDER INFERI
OR ISCHEMIA ABNORMAL ECG compared to prior ekg, right bundle branch block is no longer present. 

 

 PREVIOUS TRACING            : 03/12/2015 17.29

 

DOCTOR:   Felicia Poole  Interpretating Date/Time  05/22/2018 16:30:36

## 2018-05-22 NOTE — PD.CAR.PN
CVT Progress Note


Subjective/Hospital Course:


Patient seen


Full consult dictated


CTA with runoff ordered and pending


Thanks


LORI


5/21/2018


CTA with a runoff was performed and reviewed it.  CTA confirms the clinical 

findings and gives more detail as to the nature of patient's disease


Patient has severe peripheral vascular disease with inflow and outflow 

occlusive problems.


Right common iliac artery is occluded and then reconstitutes as external iliac 

artery to run into the groin.


Right common iliac profunda and SFA are occluded proximally and then there are 

several narrowings distally in the SFA


Patient has one-vessel runoff to the foot on the right the anterior tibial 

artery


Left external iliac is significantly stenosed but patent and then in the groin 

there is common femoral deep femoral and SFA near occlusion


Distal SFA is occluded in the adductor canal and patient has again one-vessel 

runoff to the foot via a posterior tibial


Summarized patient has severe peripheral vascular disease bilateral and in a 

deal case scenario patient with undergo aortobifemoral bypass with femoral 

endarterectomies and possible angioplasties of SFA.


Patient is clearly not a candidate for a surgical procedure of this size and 

extent.


Therefore patient will be taken to the operating room for a bilateral femoral 

endarterectomy and femorofemoral bypass with left external iliac balloon 

angioplasty and stent and left SFA balloon angioplasty


We will take patient to the operating room on Wednesday and I have explained 

the risks and benefits to the patient.


5/22/2018


As above noted patient has right common iliac occlusion and reconstitution 

distal to it while on the left side which is symptomatic patient has a severe 

stenosis throughout the short segment occlusion of SFA


We will take to the operating room tomorrow for femoral endarterectomy and 

common external iliac balloon angioplasty stents and SFA angioplasty


Objective:





Vital Signs








  Date Time  Temp Pulse Resp B/P (MAP) Pulse Ox O2 Delivery O2 Flow Rate FiO2


 


5/22/18 16:05 98.8 81 20 103/56 (72) 97   


 


5/22/18 12:22 97.6 86 20 91/63 (72) 98   


 


5/22/18 08:20 98.0 63 20 98/55 (69) 99   


 


5/22/18 04:15 99.0 75 16 117/64 (81) 98   


 


5/21/18 23:50 98.9 66 16 129/61 (83) 99   


 


5/21/18 23:33   18     


 


5/21/18 20:32 99.2 91 16 109/42 (64) 96   


 


5/21/18 20:00      Room Air  








Result Diagram:  


5/21/18 0311                                                                   

             5/21/18 0311














Orly Andrade MD May 22, 2018 17:46

## 2018-05-23 VITALS
RESPIRATION RATE: 17 BRPM | TEMPERATURE: 98.1 F | OXYGEN SATURATION: 100 % | DIASTOLIC BLOOD PRESSURE: 88 MMHG | SYSTOLIC BLOOD PRESSURE: 113 MMHG | HEART RATE: 86 BPM

## 2018-05-23 VITALS
OXYGEN SATURATION: 100 % | HEART RATE: 76 BPM | SYSTOLIC BLOOD PRESSURE: 106 MMHG | RESPIRATION RATE: 16 BRPM | DIASTOLIC BLOOD PRESSURE: 77 MMHG | TEMPERATURE: 98.5 F

## 2018-05-23 VITALS
SYSTOLIC BLOOD PRESSURE: 88 MMHG | RESPIRATION RATE: 12 BRPM | OXYGEN SATURATION: 100 % | TEMPERATURE: 97.6 F | HEART RATE: 74 BPM | DIASTOLIC BLOOD PRESSURE: 62 MMHG

## 2018-05-23 VITALS — HEART RATE: 88 BPM

## 2018-05-23 PROCEDURE — 04CK0ZZ EXTIRPATION OF MATTER FROM RIGHT FEMORAL ARTERY, OPEN APPROACH: ICD-10-PCS | Performed by: SURGERY

## 2018-05-23 PROCEDURE — 04UK0JZ SUPPLEMENT RIGHT FEMORAL ARTERY WITH SYNTHETIC SUBSTITUTE, OPEN APPROACH: ICD-10-PCS | Performed by: SURGERY

## 2018-05-23 PROCEDURE — 04CJ0ZZ EXTIRPATION OF MATTER FROM LEFT EXTERNAL ILIAC ARTERY, OPEN APPROACH: ICD-10-PCS | Performed by: SURGERY

## 2018-05-23 PROCEDURE — 0T9B70Z DRAINAGE OF BLADDER WITH DRAINAGE DEVICE, VIA NATURAL OR ARTIFICIAL OPENING: ICD-10-PCS | Performed by: SURGERY

## 2018-05-23 PROCEDURE — 04CH0ZZ EXTIRPATION OF MATTER FROM RIGHT EXTERNAL ILIAC ARTERY, OPEN APPROACH: ICD-10-PCS | Performed by: SURGERY

## 2018-05-23 PROCEDURE — 041L0JH BYPASS LEFT FEMORAL ARTERY TO RIGHT FEMORAL ARTERY WITH SYNTHETIC SUBSTITUTE, OPEN APPROACH: ICD-10-PCS | Performed by: SURGERY

## 2018-05-23 PROCEDURE — 047H3EZ DILATION OF RIGHT EXTERNAL ILIAC ARTERY WITH TWO INTRALUMINAL DEVICES, PERCUTANEOUS APPROACH: ICD-10-PCS | Performed by: SURGERY

## 2018-05-23 PROCEDURE — 04UH0JZ SUPPLEMENT RIGHT EXTERNAL ILIAC ARTERY WITH SYNTHETIC SUBSTITUTE, OPEN APPROACH: ICD-10-PCS | Performed by: SURGERY

## 2018-05-23 PROCEDURE — 047L3DZ DILATION OF LEFT FEMORAL ARTERY WITH INTRALUMINAL DEVICE, PERCUTANEOUS APPROACH: ICD-10-PCS | Performed by: SURGERY

## 2018-05-23 PROCEDURE — 047H3DZ DILATION OF RIGHT EXTERNAL ILIAC ARTERY WITH INTRALUMINAL DEVICE, PERCUTANEOUS APPROACH: ICD-10-PCS | Performed by: SURGERY

## 2018-05-23 PROCEDURE — 04CL0ZZ EXTIRPATION OF MATTER FROM LEFT FEMORAL ARTERY, OPEN APPROACH: ICD-10-PCS | Performed by: SURGERY

## 2018-05-23 RX ADMIN — Medication SCH ML: at 21:00

## 2018-05-23 RX ADMIN — CEFAZOLIN SODIUM SCH MLS/HR: 2 SOLUTION INTRAVENOUS at 21:49

## 2018-05-23 RX ADMIN — Medication SCH ML: at 07:15

## 2018-05-23 RX ADMIN — PHENYTOIN SODIUM SCH MLS/HR: 50 INJECTION INTRAMUSCULAR; INTRAVENOUS at 21:45

## 2018-05-23 RX ADMIN — CEFAZOLIN SODIUM IN SODIUM CHLORIDE 0.9% IV SOLN 2 GM/100ML SCH MLS/HR: 2-0.9/1 SOLUTION at 12:00

## 2018-05-23 RX ADMIN — CEFAZOLIN SODIUM IN SODIUM CHLORIDE 0.9% IV SOLN 2 GM/100ML SCH MLS/HR: 2-0.9/1 SOLUTION at 04:34

## 2018-05-23 RX ADMIN — CLOPIDOGREL BISULFATE SCH MG: 75 TABLET, FILM COATED ORAL at 20:58

## 2018-05-23 NOTE — HHI.PR
Subjective


Remarks


in no acute distress.


resting comfortably.


no fever.


awaiting vascular intervention today.





Objective


Vitals





Vital Signs








  Date Time  Temp Pulse Resp B/P (MAP) Pulse Ox O2 Delivery O2 Flow Rate FiO2


 


5/23/18 04:07 98.5 76 16 106/77 (87) 100   


 


5/22/18 23:48 98.5 113 16 138/73 (94) 99   


 


5/22/18 20:39 98.6 81 16 96/61 (73) 100   


 


5/22/18 20:30      Room Air  


 


5/22/18 16:05 98.8 81 20 103/56 (72) 97   


 


5/22/18 12:22 97.6 86 20 91/63 (72) 98   


 


5/22/18 08:20 98.0 63 20 98/55 (69) 99   














I/O      


 


 5/22/18 5/22/18 5/22/18 5/23/18 5/23/18 5/23/18





 07:00 15:00 23:00 07:00 15:00 23:00


 


Intake Total 320 ml  940 ml 340 ml  


 


Output Total 100 ml  200 ml 600 ml  


 


Balance 220 ml  740 ml -260 ml  


 


      


 


Intake Oral 120 ml  840 ml 240 ml  


 


IV Total 200 ml  100 ml 100 ml  


 


Output Urine Total 100 ml  200 ml 600 ml  


 


# Voids   1   


 


# Bowel Movements 1  1 4  








Result Diagram:  


5/21/18 0311                                                                   

             5/21/18 0311





Imaging





Last Impressions








Aorta w/Runoff CTA 5/20/18 0000 Signed





Impressions: 





 Service Date/Time:  Ron, May 20, 2018 17:02 - CONCLUSION:  1. Bladder is 





 severely distended which may reflect bladder outlet obstruction or neurogenic 





 bladder. Patient may benefit from Roman decompression. 2. No significant 

aortic 





 stenosis. 3. Occluded right common iliac origin with reconstitution of the 





 common femoral artery. 4. Moderate long segment stenosis of the left common 





 iliac artery and proximal left external iliac artery. 5. Heavily calcified 





 common femoral bifurcations bilaterally with diffusely diseased profunda. 6. 





 Diffusely diseased bilateral SFA with multiple severe stenoses of the right 

SFA 





 in the mid to distal thigh and short segment occlusion of the left SFA at the 





 adductor canal. 7. Limited two-vessel runoff bilaterally, as above.     

Yvan Sanon MD 


 


Foot X-Ray 5/18/18 0000 Signed





Impressions: 





 Service Date/Time:  Friday, May 18, 2018 21:46 - CONCLUSION:  Suspected 

chronic 





 deformity of the hindfoot.     Iván Shields MD 


 


Foot MRI 5/18/18 0000 Signed





Impressions: 





 Service Date/Time:  Saturday, May 19, 2018 00:12 - CONCLUSION:  1. No definite 





 evidence for osteomyelitis. No discrete abscess. 2. Unusual multifocal 





 subchondral marrow edema throughout the left foot and ankle as above. The 





 finding is nonspecific. It can be related to immobilization of the foot or 





 limited use of the foot.     Sumit Arteaga MD 








Objective Remarks


GENERAL: This is a well-nourished, well-developed patient, in no apparent 

distress.


CARDIOVASCULAR: Regular rate and regular rhythm without murmurs, gallops, or 

rubs. 


RESPIRATORY: Clear to auscultation. Breath sounds equal bilaterally. No wheezes

, rales, or rhonchi.  


GASTROINTESTINAL: Abdomen soft, non-tender, nondistended. 


Normal, active bowel sounds


MUSCULOSKELETAL: left foot is swollen.


NEURO:  Alert & Oriented x4 to person, place, time, situation.  Moves all ext x4


Medications and IVs





Inpatient Medications


Acetaminophen (Tylenol) 650 mg Q6H  PRN PO FEVER/PAIN SCALE 1 TO 2;  Start 5/19/ 18 at 02:00


Acetaminophen/ Hydrocodone Bitart (Norco  5-325 Mg) 1 tab Q4H  PRN PO PAIN 

SCALE 3 TO 5 Last administered on 5/21/18at 22:19;  Start 5/19/18 at 02:00


Bisacodyl (Dulcolax Supp) 10 mg DAILY  PRN RECTAL SEVERE CONSITIPATION;  Start 5 /19/18 at 02:00


Cefazolin/Sodium Chloride 100 ml @  200 mls/hr Q8H IV  Last administered on 5/23 /18at 04:34;  Start 5/20/18 at 20:00


Clindamycin/ Sodium Chloride 50 ml @  100 mls/hr Q8H IV  Last administered on 5/ 19/18at 05:50;  Start 5/19/18 at 06:00;  Stop 5/19/18 at 10:37;  Status DC


Lactulose (Lactulose Liq) 30 ml DAILY  PRN PO SEVERE CONSITIPATION;  Start 5/19/ 18 at 02:00


Magnesium Hydroxide (Milk Of Magnesia Liq) 30 ml Q12H  PRN PO Mild constipation

;  Start 5/19/18 at 02:00


Metoclopramide HCl (Reglan Inj) 5 mg Q6H  PRN IV PUSH NAUSEA OR VOMITING;  

Start 5/19/18 at 02:00


Miscellaneous Information ([facility] Ordered Lab Info) SPECIFIC LAB TO BE 

... ONCE  ONCE .XX ;  Start 5/22/18 at 11:45;  Stop 5/22/18 at 11:45;  

Status DC


Morphine Sulfate (Morphine Inj) 2 mg Q3H  PRN IV PUSH Pain 6-10 Last 

administered on 5/19/18at 12:07;  Start 5/19/18 at 02:15


Pharmacy Profile Note 0 ml @ 0 mls/hr UNSCH OTHER ;  Start 5/19/18 at 10:45;  

Stop 5/20/18 at 19:38;  Status DC


Senna/Docusate Sodium (Yeimi-Colace) 1 tab BID PO ;  Start 5/19/18 at 09:00;  

Status Future Hold


Sennosides (Senokot) 17.2 mg Q12H  PRN PO Moderate constipation;  Start 5/19/18 

at 02:00


Sodium Chloride (NS Flush) 2 ml BID IV FLUSH  Last administered on 5/23/18at 07:

15;  Start 5/19/18 at 09:00


Vancomycin HCl 1000 mg/Sodium Chloride 250 ml @  250 mls/hr Q24H IV  Last 

administered on 5/20/18at 11:48;  Start 5/19/18 at 12:00;  Stop 5/20/18 at 19:38

;  Status DC





A/P


Problem List:  


(1) Cellulitis of left foot


ICD Code:  L03.116 - Cellulitis of left lower limb


Status:  Acute


(2) Intractable pain


ICD Code:  R52 - Pain, unspecified


Status:  Acute


Assessment and Plan


Sepsis with Left Foot Cellulitis: Concern for septic joint vs osteomyelitis. 

Symptoms x1 month with worsening wound at left medial MTP and diffuse pain/

warmth/edema. WBC 14.5K, tachycardic, with suspected source-cellulitis vs 

septic joint. 


   -X-ray reviewed, shows suspected chronic deformity hindfoot


   -MRI Foot reviewed, no definite osteomyelitis, however unusual marrow edema 

throughout left foot and ankle


   -initial Blood cultures with one bottle positive for staph Aureus


   -Wound culture with MSSA


   -podiatry consult appreciated and no plan for surgical intervention at this 

time.


   -will follow the repeated blood cultures and echo.


   -ID following.


   


PVD


   - CTA runoff as noted above.


   -vascular surgery f/u appreciated and plan for vascular intervention today.





All other medical conditions stable, continue home meds as appropriate. 





DVT Prophylaxis:  Mechanical contraindication secondary to wound/cellulitis. 

Chemoprophylaxis on hold with  upcoming procedure


Discharge Planning


for vascular intervention today.











Enmanuel Dutta MD May 23, 2018 07:33

## 2018-05-23 NOTE — HHI.PR
Subjective


Remarks


Patient seen bedside with family present.  Reports mild pain to left first MPJ





Objective





Vital Signs








  Date Time  Temp Pulse Resp B/P (MAP) Pulse Ox O2 Delivery O2 Flow Rate FiO2


 


5/23/18 19:30 97.5 83 17 97/60 (72) 99 Nasal Cannula 2 


 


5/23/18 19:15  82 15 98/54 (69) 99 Nasal Cannula 2 


 


5/23/18 19:00  75 23 113/58 (76) 99 Nasal Cannula 2 


 


5/23/18 18:46  73 15 126/75 (92) 98 Nasal Cannula 2 


 


5/23/18 18:45 96.4 73 15 119/89 (99) 97 Nasal Cannula 2 


 


5/23/18 08:08 98.1 86 17 113/88 (96) 100   


 


5/23/18 04:07 98.5 76 16 106/77 (87) 100   


 


5/22/18 23:48 98.5 113 16 138/73 (94) 99   














I/O      


 


 5/22/18 5/22/18 5/22/18 5/23/18 5/23/18 5/23/18





 07:00 15:00 23:00 07:00 15:00 23:00


 


Intake Total 320 ml  940 ml 340 ml  1400 ml


 


Output Total 100 ml  200 ml 600 ml  450 ml


 


Balance 220 ml  740 ml -260 ml  950 ml


 


      


 


Intake Oral 120 ml  840 ml 240 ml  


 


IV Total 200 ml  100 ml 100 ml  


 


Other      1400 ml


 


Output Urine Total 100 ml  200 ml 600 ml  400 ml


 


Estimated Blood Loss      50 ml


 


# Voids   1   


 


# Bowel Movements 1  1 4  








Result Diagram:  


5/21/18 0311                                                                   

             5/21/18 0311





Imaging





Last Impressions








Aorta w/Runoff CTA 5/20/18 0000 Signed





Impressions: 





 Service Date/Time:  Ron, May 20, 2018 17:02 - CONCLUSION:  1. Bladder is 





 severely distended which may reflect bladder outlet obstruction or neurogenic 





 bladder. Patient may benefit from Roman decompression. 2. No significant 

aortic 





 stenosis. 3. Occluded right common iliac origin with reconstitution of the 





 common femoral artery. 4. Moderate long segment stenosis of the left common 





 iliac artery and proximal left external iliac artery. 5. Heavily calcified 





 common femoral bifurcations bilaterally with diffusely diseased profunda. 6. 





 Diffusely diseased bilateral SFA with multiple severe stenoses of the right 

SFA 





 in the mid to distal thigh and short segment occlusion of the left SFA at the 





 adductor canal. 7. Limited two-vessel runoff bilaterally, as above.     

Yvan Sanon MD 


 


Foot X-Ray 5/18/18 0000 Signed





Impressions: 





 Service Date/Time:  Friday, May 18, 2018 21:46 - CONCLUSION:  Suspected 

chronic 





 deformity of the hindfoot.     Iván Shields MD 


 


Foot MRI 5/18/18 0000 Signed





Impressions: 





 Service Date/Time:  Saturday, May 19, 2018 00:12 - CONCLUSION:  1. No definite 





 evidence for osteomyelitis. No discrete abscess. 2. Unusual multifocal 





 subchondral marrow edema throughout the left foot and ankle as above. The 





 finding is nonspecific. It can be related to immobilization of the foot or 





 limited use of the foot.     Sumit Arteaga MD 








Other Results





Microbiology








 Date/Time


Source Procedure


Growth Status


 


 


 5/21/18 03:28


Blood Peripheral Aerobic Blood Culture - Preliminary


NO GROWTH IN 2 DAYS Resulted


 


 5/21/18 03:28


Blood Peripheral Anaerobic Blood Culture - Preliminary


NO GROWTH IN 2 DAYS Resulted





 5/18/18 21:30


Wound Foot Gram Stain - Final Complete


 


 5/18/18 21:30 Wound Culture - Final


Staphylococcus Aureus Complete








Objective Remarks


Left first MPJ with noted discoloration, no erythema noted, edema noted, 

superficial medial eminence wound noted with fibronecrotic base, no drainage 

noted, no purulent drainage upon compression, reported mild pain on range of 

motion and palpation.  No clinical signs of infection.


Medications and IVs





Current Medications








 Medications


  (Trade)  Dose


 Ordered  Sig/Antony


 Route  Start Time


 Stop Time Status Last Admin


 


  (NS Flush)  2 ml  UNSCH  PRN


 IV FLUSH  5/19/18 02:00


    5/21/18 05:18


 


 


  (NS Flush)  2 ml  BID


 IV FLUSH  5/19/18 09:00


    5/23/18 21:00


 


 


  (Reglan Inj)  5 mg  Q6H  PRN


 IV PUSH  5/19/18 02:00


     


 


 


  (Tylenol)  650 mg  Q6H  PRN


 PO  5/19/18 02:00


     


 


 


  (Norco  5-325 Mg)  1 tab  Q4H  PRN


 PO  5/19/18 02:00


    5/21/18 22:19


 


 


  (Morphine Inj)  2 mg  Q3H  PRN


 IV PUSH  5/19/18 02:15


    5/19/18 12:07


 


 


  (Yeimi-Colace)  1 tab  BID


 PO  5/19/18 09:00


   Future Hold  


 


 


  (Milk Of


 Magnesia Liq)  30 ml  Q12H  PRN


 PO  5/19/18 02:00


     


 


 


  (Senokot)  17.2 mg  Q12H  PRN


 PO  5/19/18 02:00


     


 


 


  (Dulcolax Supp)  10 mg  DAILY  PRN


 RECTAL  5/19/18 02:00


     


 


 


  (Lactulose Liq)  30 ml  DAILY  PRN


 PO  5/19/18 02:00


     


 


 


  (Misc Nursing


 Information)  ALL


 NURSING


 DEPARTME...  UNSCH  PRN


 .XX  5/23/18 18:42


 5/24/18 18:41   


 


 


  (Plavix)  75 mg  DAILY


 PO  5/23/18 20:00


    5/23/18 20:58


 


 


 Cefazolin Sodium/


 Dextrose  50 ml @ 


 100 mls/hr  Q8H


 IV  5/23/18 21:00


     


 











Assessment and Plan


Assessment and Plan


61-year-old male status post revascularization with left medial eminence 

superficial ulcer





Patient examined evaluated with all questions answered


Apply Betadine and dry sterile dressing to first MPJ


Recommended accommodative diabetic shoes although patient is not diabetic to 

avoid pressure ulcer to medial eminence of left foot


Discussed with family at bedside


Follow-up with [place] [facility]


Stable per podiatry -okay to DC per


Reconsult as needed











Ирина Pool DPM May 23, 2018 21:10

## 2018-05-23 NOTE — MP
cc:

Orly Andrade MD, Slobodan MD

****

 

 

DATE OF OPERATION:

05/23/2018

 

 

PREOPERATIVE DIAGNOSIS:

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 

near occlusion of the left common iliac and external iliac arteries, 

near occlusion of the bilateral common femoral arteries and left 

superficial femoral artery distal occlusion.

 

POSTOPERATIVE DIAGNOSIS:

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 

near occlusion of the left common iliac and external iliac arteries, 

near occlusion of the bilateral common femoral arteries and left 

superficial femoral artery distal occlusion.

 

OPERATIVE PROCEDURE:

Left common iliac and external iliac balloon angioplasty and stent,  

left SFA Hawk 1 endarterectomy and balloon angioplasty, left external 

iliac and common femoral endarterectomy patch angioplasty, right 

external iliac and common femoral endarterectomy with patch 

angioplasty and femoral-femoral bypass.

 

SURGEON:

Orly Andrade

 

INTERNAL RADIOLOGIST AND CO-SURGEON:

Dr. Jhon Tam.

 

ANESTHESIA:

General.

 

ESTIMATED BLOOD LOSS:

200 mL

 

PROCEDURE IN DETAIL:

The patient was prepped and draped in usual fashion and both groins 

are exposed through oblique incisions.  Common femoral deep and 

superficial femoral arteries are isolated, as arteries like lead.  

These are filled with plaque and very firm.  There is barely any pulse

in the right common femoral artery and the left has a very weak pulse 

as well.  Vessel loops are placed around each vessel respectively.  

The patient is given 5000 units of heparin and now  retrograde the 

common femoral artery is cannulated into the external iliac by placing

first a needle then a Glidewire.  Over the Glidewire,  the 7 sheath is

placed.  Arteriogram is obtained.  Arteriogram reveals stenosis of the

common femoral and external iliac arteries as noted above and this was

seen by the CT angiogram.  A Glidewire is now passed into the aorta 

and then position is checked with superimposed imaging.  The 8 mm x 6 

cm stent is now introduced and deployed. Arteriogram is obtained.  

There is one area of narrow stent and this one is balloon dilated.  

The final x-ray is obtained and there is excellent flow down into the 

external iliac artery and the groin.  The 7-Portuguese introducer sheath 

and the dilator are now reversed and placed downward into the 

superficial femoral artery and then a Glidewire is passed down.  The 

patient has an arteriogram obtained.  The patient has an occlusion of 

the superficial femoral artery in the adductor canal with some large 

branches coming off.  The occlusion is crossed with a roadrunner wire 

and then the  Hulk 1

arthrectomy device is used to clean this vessel out.  A large amount 

of debris is withdrawn into the basket.  This is done about 3 times 

and then the final arteriogram is obtained which shows excellent flow 

through the vessel.  A 5 mm x 4 cm balloon is introduced and vessel is

tapped down where the atherectomy was performed and final arteriogram 

shows excellent flow.  This terminated this part of the procedure.  

The open part of the procedure was done by me.  For the endovascular 

part of the procedure, see details by Dr. Tam.

 

Open part of the procedure is now performed.  The left common femoral 

and external iliac artery is exposed and then observed. Clamps applied

Satinsky proximally. profunda clamp to profunda and distally the 

superficial femoral artery vessel is opened longitudinally with Ruelas 

scissors and observe.  The patient has a huge plaque in the left 

common femoral and external iliac artery, which is rock hard.  This 

one is dissected in the media plane with a freer dissector and then 

removed.  The surface of the vessel is smoothened out with heparinized

saline.  Small debris is removed.  The right common femoral and 

external iliac arteries are now attended.  Again, Satinsky clamp is 

placed fairly high on the vessel where it is nice and soft and the 

vessel is opened with Ruelas scissors.  There is a huge plaque in there

that is worse on the right than on the left, almost occludes the 

vessel completely. This one is dissected in a media plane and removed 

in 2 pieces.  Profundoplasty performed which results in nice 

backbleeding.  Again, vessel is irrigated with copious amounts of 

heparinized saline and washed out.  Small debris is removed.  An 8 mm 

ringed Hunt-Julian graft is now passed through suprapubic tunnel from 

right to left [facility] tunneler and then cut to size, first on the 

right groin under very steep angle to use the distal anastomosis in 

the form of patch angioplasty.  The graft is sewn into the vessel and 

anastomosis created with 5-0 running Prolene.  Then, the clamps are  

removed from the vessel.  The graft is flushed with heparinized saline

and then clamp applied only to the graft allowing the blood flow in 

the right leg to continue.  The graft is now cut to size under oblique

angle with an 11-blade and used again to create anastomosis, which at 

the same time serves as

patch angioplasty.  Graft is sewn in with a running 5-0 Prolene and 

then blood flow reestablished.  At the end of procedure, the patient 

has bounding pulses in both groins and feet readily warm up.  Area 

irrigated with saline.  A piece of Surgicel placed on each side.  

Incision closed with 0 Vicryl in layers with 4-0 Monocryl.  The 

patient tolerated the procedure well.

 

 

__________________________________

MD LITO Dougherty/

D: 05/23/2018, 09:22 PM

T: 05/23/2018, 09:55 PM

Visit #: [id number]

Job #: [id number]

## 2018-05-24 VITALS
RESPIRATION RATE: 12 BRPM | OXYGEN SATURATION: 98 % | HEART RATE: 90 BPM | TEMPERATURE: 98.1 F | DIASTOLIC BLOOD PRESSURE: 63 MMHG | SYSTOLIC BLOOD PRESSURE: 86 MMHG

## 2018-05-24 VITALS
DIASTOLIC BLOOD PRESSURE: 50 MMHG | OXYGEN SATURATION: 99 % | HEART RATE: 88 BPM | RESPIRATION RATE: 19 BRPM | SYSTOLIC BLOOD PRESSURE: 75 MMHG | TEMPERATURE: 98.8 F

## 2018-05-24 VITALS
RESPIRATION RATE: 24 BRPM | TEMPERATURE: 98.4 F | DIASTOLIC BLOOD PRESSURE: 55 MMHG | OXYGEN SATURATION: 99 % | SYSTOLIC BLOOD PRESSURE: 100 MMHG | HEART RATE: 84 BPM

## 2018-05-24 VITALS
SYSTOLIC BLOOD PRESSURE: 89 MMHG | DIASTOLIC BLOOD PRESSURE: 52 MMHG | OXYGEN SATURATION: 100 % | TEMPERATURE: 98.5 F | HEART RATE: 86 BPM | RESPIRATION RATE: 16 BRPM

## 2018-05-24 VITALS — HEART RATE: 83 BPM

## 2018-05-24 VITALS — HEART RATE: 90 BPM

## 2018-05-24 VITALS
SYSTOLIC BLOOD PRESSURE: 72 MMHG | RESPIRATION RATE: 23 BRPM | TEMPERATURE: 99.5 F | DIASTOLIC BLOOD PRESSURE: 54 MMHG | OXYGEN SATURATION: 100 % | HEART RATE: 94 BPM

## 2018-05-24 VITALS
SYSTOLIC BLOOD PRESSURE: 98 MMHG | OXYGEN SATURATION: 100 % | DIASTOLIC BLOOD PRESSURE: 56 MMHG | HEART RATE: 88 BPM | RESPIRATION RATE: 22 BRPM | TEMPERATURE: 99.2 F

## 2018-05-24 VITALS — HEART RATE: 87 BPM

## 2018-05-24 VITALS — HEART RATE: 96 BPM

## 2018-05-24 VITALS — HEART RATE: 93 BPM

## 2018-05-24 VITALS — HEART RATE: 94 BPM

## 2018-05-24 LAB
BASOPHILS # BLD AUTO: 0.1 TH/MM3 (ref 0–0.2)
BASOPHILS NFR BLD: 0.7 % (ref 0–2)
BUN SERPL-MCNC: 8 MG/DL (ref 7–18)
CALCIUM SERPL-MCNC: 8.4 MG/DL (ref 8.5–10.1)
CHLORIDE SERPL-SCNC: 106 MEQ/L (ref 98–107)
CREAT SERPL-MCNC: 1.1 MG/DL (ref 0.6–1.3)
EOSINOPHIL # BLD: 0 TH/MM3 (ref 0–0.4)
EOSINOPHIL NFR BLD: 0.2 % (ref 0–4)
ERYTHROCYTE [DISTWIDTH] IN BLOOD BY AUTOMATED COUNT: 16.1 % (ref 11.6–17.2)
GFR SERPLBLD BASED ON 1.73 SQ M-ARVRAT: 82 ML/MIN (ref 89–?)
GLUCOSE SERPL-MCNC: 129 MG/DL (ref 74–106)
HCO3 BLD-SCNC: 25.1 MEQ/L (ref 21–32)
HCT VFR BLD CALC: 30 % (ref 39–51)
HGB BLD-MCNC: 9.8 GM/DL (ref 13–17)
LYMPHOCYTES # BLD AUTO: 1.4 TH/MM3 (ref 1–4.8)
LYMPHOCYTES NFR BLD AUTO: 13 % (ref 9–44)
MCH RBC QN AUTO: 23.7 PG (ref 27–34)
MCHC RBC AUTO-ENTMCNC: 32.6 % (ref 32–36)
MCV RBC AUTO: 72.7 FL (ref 80–100)
MONOCYTE #: 1 TH/MM3 (ref 0–0.9)
MONOCYTES NFR BLD: 8.9 % (ref 0–8)
NEUTROPHILS # BLD AUTO: 8.6 TH/MM3 (ref 1.8–7.7)
NEUTROPHILS NFR BLD AUTO: 77.2 % (ref 16–70)
PLATELET # BLD: 187 TH/MM3 (ref 150–450)
PMV BLD AUTO: 7.7 FL (ref 7–11)
RBC # BLD AUTO: 4.13 MIL/MM3 (ref 4.5–5.9)
SODIUM SERPL-SCNC: 140 MEQ/L (ref 136–145)
WBC # BLD AUTO: 11.1 TH/MM3 (ref 4–11)

## 2018-05-24 RX ADMIN — Medication SCH ML: at 08:54

## 2018-05-24 RX ADMIN — CEFAZOLIN SODIUM SCH MLS/HR: 2 SOLUTION INTRAVENOUS at 20:46

## 2018-05-24 RX ADMIN — CEFAZOLIN SODIUM SCH MLS/HR: 2 SOLUTION INTRAVENOUS at 12:20

## 2018-05-24 RX ADMIN — PHENYTOIN SODIUM SCH MLS/HR: 50 INJECTION INTRAMUSCULAR; INTRAVENOUS at 08:54

## 2018-05-24 RX ADMIN — Medication SCH ML: at 20:47

## 2018-05-24 RX ADMIN — CEFAZOLIN SODIUM SCH MLS/HR: 2 SOLUTION INTRAVENOUS at 05:21

## 2018-05-24 RX ADMIN — CLOPIDOGREL BISULFATE SCH MG: 75 TABLET, FILM COATED ORAL at 08:55

## 2018-05-24 NOTE — PD.CAR.PN
CVT Progress Note


Subjective/Hospital Course:


Patient seen


Full consult dictated


CTA with runoff ordered and pending


Thanks


LORI


5/21/2018


CTA with a runoff was performed and reviewed it.  CTA confirms the clinical 

findings and gives more detail as to the nature of patient's disease


Patient has severe peripheral vascular disease with inflow and outflow 

occlusive problems.


Right common iliac artery is occluded and then reconstitutes as external iliac 

artery to run into the groin.


Right common iliac profunda and SFA are occluded proximally and then there are 

several narrowings distally in the SFA


Patient has one-vessel runoff to the foot on the right the anterior tibial 

artery


Left external iliac is significantly stenosed but patent and then in the groin 

there is common femoral deep femoral and SFA near occlusion


Distal SFA is occluded in the adductor canal and patient has again one-vessel 

runoff to the foot via a posterior tibial


Summarized patient has severe peripheral vascular disease bilateral and in a 

deal case scenario patient with undergo aortobifemoral bypass with femoral 

endarterectomies and possible angioplasties of SFA.


Patient is clearly not a candidate for a surgical procedure of this size and 

extent.


Therefore patient will be taken to the operating room for a bilateral femoral 

endarterectomy and femorofemoral bypass with left external iliac balloon 

angioplasty and stent and left SFA balloon angioplasty


We will take patient to the operating room on Wednesday and I have explained 

the risks and benefits to the patient.


5/22/2018


As above noted patient has right common iliac occlusion and reconstitution 

distal to it while on the left side which is symptomatic patient has a severe 

stenosis throughout the short segment occlusion of SFA


We will take to the operating room tomorrow for femoral endarterectomy and 

common external iliac balloon angioplasty stents and SFA angioplasty


5/24/2018


Patient status post revascularization of the left leg by balloon angioplasty 

stenting of the inflow and outflow tracts, SFA arthrectomy and right leg 

endarterectomy and femorofemoral bypass


Patient is awake alert oriented


Excellent palpable femoral pulses bilateral


Incisions are clean and dry


Feet are nice and warm


Patient can be transferred to floor from my point and further care per Dr. Pool, and medicine


Objective:





Vital Signs








  Date Time  Temp Pulse Resp B/P (MAP) Pulse Ox O2 Delivery O2 Flow Rate FiO2


 


5/24/18 10:00  87      


 


5/24/18 08:00  68      


 


5/24/18 08:00 99.5 94 23 72/54 (60) 100   





    Automatic Cuff    


 


5/24/18 07:00     100 Room Air  


 


5/24/18 06:00  94      


 


5/24/18 04:00 98.5 86 16 89/52 (64) 100   


 


5/24/18 04:00  86      


 


5/24/18 02:00  96      


 


5/24/18 00:00  90      


 


5/24/18 00:00 98.1 90 12 86/63 (71) 98   


 


5/23/18 22:00  88      


 


5/23/18 20:00  74      


 


5/23/18 20:00 97.6 74 12 88/62 (71) 100   


 


5/23/18 19:30 97.5 83 17 97/60 (72) 99 Nasal Cannula 2 


 


5/23/18 19:15  82 15 98/54 (69) 99 Nasal Cannula 2 


 


5/23/18 19:00  75 23 113/58 (76) 99 Nasal Cannula 2 


 


5/23/18 19:00     97 Nasal Cannula 2.00 


 


5/23/18 18:46  73 15 126/75 (92) 98 Nasal Cannula 2 


 


5/23/18 18:45 96.4 73 15 119/89 (99) 97 Nasal Cannula 2 








Result Diagram:  


5/21/18 0311 5/21/18 0311














Orly Andrade MD May 24, 2018 11:23

## 2018-05-24 NOTE — ECHRPT
Indication:   AORTIC ILIAC OCCLUSION, PRE OP WORK UP 

 

 CONCLUSIONS 

 Normal left ventricular size. EF@65% 

 Mild concentric left ventricular hypertrophy.  

 The left ventricular systolic function is grossly normal on limited imaging.  

 mild mitral valve regurgitation.  

 There is trace tricuspid valve regurgitation.  

 The estimated pulmonary arterial pressure is 33.6 mmHg.  

 

 

 

 

 BP:  113   / 88      HR: 86                       Rhythm:           Sinus 

 

 MEASUREMENTS  (Male / Female) Normal Values       Technical Quality:Fair 

 2D ECHO 

 LV Diastolic Diameter PLAX        4.3 cm                4.2 - 5.9 / 3.9 - 5.3 cm 

 LV Systolic Diameter PLAX         2.8 cm                 

 IVS Diastolic Thickness           1.2 cm                0.6 - 1.0 / 0.6 - 0.9 cm 

 LVPW Diastolic Thickness          1.2 cm                0.6 - 1.0 / 0.6 - 0.9 cm 

 LV Relative Wall Thickness        0.6                    

 RV Internal Dim ED PLAX           2.2 cm                 

 LVOT Diameter                     2.1 cm                 

 Aortic Root Diameter              3.1 cm                 

 LA Systolic Diameter LX           2.7 cm                3.0 - 4.0 / 2.7 - 3.8 cm 

 

 M-MODE 

 AV Cusp Separation MM             1.7 cm                 

 

 DOPPLER 

 AV Peak Velocity                  125.0 cm/s             

 AV Peak Gradient                  6.3 mmHg               

 AV Mean Gradient                  3.0 mmHg               

 AV Velocity Time Integral         19.5 cm                

 LVOT Peak Velocity                94.0 cm/s              

 LVOT Peak Gradient                3.5 mmHg               

 LVOT Velocity Time Integral       16.0 cm                

 AV Area Cont Eq vti               2.8 cm                

 AV Area Cont Eq pk                2.6 cm                

 Mitral E Point Velocity           81.9 cm/s              

 Mitral A Point Velocity           79.5 cm/s              

 Mitral E to A Ratio               1.0                    

 LV E' Lateral Velocity            8.3 cm/s               

 Mitral E to LV E' Lateral Ratio   9.9                    

 LV E' Septal Velocity             7.0 cm/s               

 Mitral E to LV E' Septal Ratio    11.7                   

 TR Peak Velocity                  243.0 cm/s             

 TR Peak Gradient                  23.6 mmHg              

 Right Atrial Pressure             10.0 mmHg              

 Pulmonary Artery Systolic Pressu  33.6 mmHg              

 Right Ventricular Systolic Press  33.6 mmHg              

 PV Peak Velocity                  82.7 cm/s              

 PV Peak Gradient                  2.7 mmHg               

 

 

 FINDINGS 

 

 LEFT VENTRICLE 

 Normal left ventricular size.  

 Mild concentric left ventricular hypertrophy.  

 The left ventricular systolic function is grossly normal on limited imaging.  

 

 RIGHT VENTRICLE 

 Normal right ventricular size and systolic function.  

 

 LEFT ATRIUM 

 The left atrial size is normal.  

 

 RIGHT ATRIUM 

 The right atrial size is normal.  

 

 ATRIAL SEPTUM 

 No atrial level shunt is demonstrated by color flow Doppler interrogation.  

 

 AORTA 

 The aortic root and proximal ascending aorta are not well visualized.  

 

 MITRAL VALVE 

 Trace-to-mild mitral valve regurgitation.  

 

 AORTIC VALVE 

 Trileaflet aortic valve. No aortic valve stenosis or regurgitation.  

 

 TRICUSPID VALVE 

 There is trace tricuspid valve regurgitation.  

 The estimated pulmonary arterial pressure is 33.6 mmHg.  

 

 PULMONARY VALVE 

 No pulmonary valve regurgitation or stenosis.  

 

 VESSELS 

 The inferior vena cava is normal in size.  

 

 PERICARDIUM 

 No pericardial effusion.  

 

 

 

 

  Rylan Stanley MD, FACC, [facility] 

  (Electronically Signed) 

  Final Date:24 May 2018 17:24

## 2018-05-24 NOTE — HHI.PR
Subjective


Remarks


in no acute distress.


pain is controlled.


no fever.


BP trend noted but the patient is asymptomatic.


d/w the RN.





Objective


Vitals





Vital Signs








  Date Time  Temp Pulse Resp B/P (MAP) Pulse Ox O2 Delivery O2 Flow Rate FiO2


 


5/24/18 10:00  87      


 


5/24/18 08:00  68      


 


5/24/18 08:00 99.5 94 23 72/54 (60) 100   





    Automatic Cuff    


 


5/24/18 07:00     100 Room Air  


 


5/24/18 06:00  94      


 


5/24/18 04:00 98.5 86 16 89/52 (64) 100   


 


5/24/18 04:00  86      


 


5/24/18 02:00  96      


 


5/24/18 00:00  90      


 


5/24/18 00:00 98.1 90 12 86/63 (71) 98   


 


5/23/18 22:00  88      


 


5/23/18 20:00  74      


 


5/23/18 20:00 97.6 74 12 88/62 (71) 100   


 


5/23/18 19:30 97.5 83 17 97/60 (72) 99 Nasal Cannula 2 


 


5/23/18 19:15  82 15 98/54 (69) 99 Nasal Cannula 2 


 


5/23/18 19:00  75 23 113/58 (76) 99 Nasal Cannula 2 


 


5/23/18 19:00     97 Nasal Cannula 2.00 


 


5/23/18 18:46  73 15 126/75 (92) 98 Nasal Cannula 2 


 


5/23/18 18:45 96.4 73 15 119/89 (99) 97 Nasal Cannula 2 














I/O      


 


 5/23/18 5/23/18 5/23/18 5/24/18 5/24/18 5/24/18





 07:00 15:00 23:00 07:00 15:00 23:00


 


Intake Total 340 ml  1450 ml 790 ml 487 ml 


 


Output Total 600 ml  450 ml 500 ml  


 


Balance -260 ml  1000 ml 290 ml 487 ml 


 


      


 


Intake Oral 240 ml   240 ml  


 


IV Total 100 ml  50 ml 550 ml 487 ml 


 


Other   1400 ml   


 


Output Urine Total 600 ml  400 ml 500 ml  


 


Estimated Blood Loss   50 ml   


 


# Bowel Movements 4   0  








Result Diagram:  


5/21/18 0311                                                                   

             5/21/18 0311





Imaging





Last Impressions








Aorta w/Runoff CTA 5/20/18 0000 Signed





Impressions: 





 Service Date/Time:  Ron, May 20, 2018 17:02 - CONCLUSION:  1. Bladder is 





 severely distended which may reflect bladder outlet obstruction or neurogenic 





 bladder. Patient may benefit from Roman decompression. 2. No significant 

aortic 





 stenosis. 3. Occluded right common iliac origin with reconstitution of the 





 common femoral artery. 4. Moderate long segment stenosis of the left common 





 iliac artery and proximal left external iliac artery. 5. Heavily calcified 





 common femoral bifurcations bilaterally with diffusely diseased profunda. 6. 





 Diffusely diseased bilateral SFA with multiple severe stenoses of the right 

SFA 





 in the mid to distal thigh and short segment occlusion of the left SFA at the 





 adductor canal. 7. Limited two-vessel runoff bilaterally, as above.     

Yvan Sanon MD 


 


Foot X-Ray 5/18/18 0000 Signed





Impressions: 





 Service Date/Time:  Friday, May 18, 2018 21:46 - CONCLUSION:  Suspected 

chronic 





 deformity of the hindfoot.     Iván Shields MD 


 


Foot MRI 5/18/18 0000 Signed





Impressions: 





 Service Date/Time:  Saturday, May 19, 2018 00:12 - CONCLUSION:  1. No definite 





 evidence for osteomyelitis. No discrete abscess. 2. Unusual multifocal 





 subchondral marrow edema throughout the left foot and ankle as above. The 





 finding is nonspecific. It can be related to immobilization of the foot or 





 limited use of the foot.     Sumit Arteaga MD 








Objective Remarks


GENERAL: This is a well-nourished, well-developed patient, in no apparent 

distress.


CARDIOVASCULAR: Regular rate and regular rhythm without murmurs, gallops, or 

rubs. 


RESPIRATORY: Clear to auscultation. Breath sounds equal bilaterally. No wheezes

, rales, or rhonchi.  


GASTROINTESTINAL: Abdomen soft, non-tender, nondistended. 


Normal, active bowel sounds


MUSCULOSKELETAL: left foot is swollen-seems to be improving.


NEURO:  Alert & Oriented x4 to person, place, time, situation.  Moves all ext x4


Procedures


Left common iliac and external iliac balloon angioplasty and stent,  


left SFA Hawk 1 endarterectomy and balloon angioplasty, left external 


iliac and common femoral endarterectomy patch angioplasty, right 


external iliac and common femoral endarterectomy with patch 


angioplasty and femoral-femoral bypass.


Medications and IVs





Inpatient Medications


Acetaminophen (Tylenol) 650 mg Q6H  PRN PO FEVER/PAIN SCALE 1 TO 2;  Start 5/19/ 18 at 02:00


Acetaminophen/ Hydrocodone Bitart (Norco  5-325 Mg) 1 tab Q4H  PRN PO PAIN 

SCALE 3 TO 5 Last administered on 5/21/18at 22:19;  Start 5/19/18 at 02:00


Albumin Human 500 ml @  250 mls/hr NOW IV  Last administered on 5/24/18at 01:39

;  Start 5/24/18 at 01:45;  Stop 5/24/18 at 03:44;  Status DC


Bisacodyl (Dulcolax Supp) 10 mg DAILY  PRN RECTAL SEVERE CONSITIPATION;  Start 5 /19/18 at 02:00


Cefazolin Sodium/ Dextrose 50 ml @  100 mls/hr Q8H IV  Last administered on 5/24 /18at 05:21;  Start 5/23/18 at 21:00


Cefazolin/Sodium Chloride 100 ml @  200 mls/hr Q8H IV  Last administered on 5/23 /18at 04:34;  Start 5/20/18 at 20:00;  Stop 5/23/18 at 21:06;  Status DC


Clindamycin/ Sodium Chloride 50 ml @  100 mls/hr Q8H IV  Last administered on 5/ 19/18at 05:50;  Start 5/19/18 at 06:00;  Stop 5/19/18 at 10:37;  Status DC


Clopidogrel Bisulfate (Plavix) 75 mg DAILY PO  Last administered on 5/24/18at 08

:55;  Start 5/23/18 at 20:00


Lactulose (Lactulose Liq) 30 ml DAILY  PRN PO SEVERE CONSITIPATION;  Start 5/19/ 18 at 02:00


Magnesium Hydroxide (Milk Of Magnesia Liq) 30 ml Q12H  PRN PO Mild constipation

;  Start 5/19/18 at 02:00


Metoclopramide HCl (Reglan Inj) 5 mg Q6H  PRN IV PUSH NAUSEA OR VOMITING;  

Start 5/19/18 at 02:00


Miscellaneous Information (Misc Nursing Information) ALL NURSING DEPARTME... 

UNSCH  PRN .XX SEE LABEL COMMENTS;  Start 5/23/18 at 18:42;  Stop 5/24/18 at 18:

41


Miscellaneous Information (Misc Pharmacy Ordered Lab Info) SPECIFIC LAB TO BE 

JUN... ONCE  ONCE .XX ;  Start 5/22/18 at 11:45;  Stop 5/22/18 at 11:45;  

Status DC


Morphine Sulfate (Morphine Inj) 2 mg Q3H  PRN IV PUSH Pain 6-10 Last 

administered on 5/19/18at 12:07;  Start 5/19/18 at 02:15


Pharmacy Profile Note 0 ml @ 0 mls/hr UNSCH OTHER ;  Start 5/19/18 at 10:45;  

Stop 5/20/18 at 19:38;  Status DC


Senna/Docusate Sodium (Yeimi-Colace) 1 tab BID PO ;  Start 5/19/18 at 09:00;  

Status Future Hold


Sennosides (Senokot) 17.2 mg Q12H  PRN PO Moderate constipation;  Start 5/19/18 

at 02:00


Sodium Chloride 1,000 ml @  100 mls/hr Q10H IV  Last administered on 5/24/18at 

08:54;  Start 5/23/18 at 21:45


Sodium Chloride (NS Flush) 2 ml BID IV FLUSH  Last administered on 5/23/18at 21:

00;  Start 5/19/18 at 09:00


Vancomycin HCl 1000 mg/Sodium Chloride 250 ml @  250 mls/hr Q24H IV  Last 

administered on 5/20/18at 11:48;  Start 5/19/18 at 12:00;  Stop 5/20/18 at 19:38

;  Status DC





A/P


Problem List:  


(1) Cellulitis of left foot


ICD Code:  L03.116 - Cellulitis of left lower limb


Status:  Acute


(2) Intractable pain


ICD Code:  R52 - Pain, unspecified


Status:  Acute


Assessment and Plan


Sepsis with Left Foot Cellulitis: Concern for septic joint vs osteomyelitis. 

Symptoms x1 month with worsening wound at left medial MTP and diffuse pain/

warmth/edema. WBC 14.5K, tachycardic, with suspected source-cellulitis vs 

septic joint. 


   -X-ray reviewed, shows suspected chronic deformity hindfoot


   -MRI Foot reviewed, no definite osteomyelitis, however unusual marrow edema 

throughout left foot and ankle


   -initial Blood cultures with one bottle positive for staph Aureus


   -Wound culture with MSSA


   -podiatry consult appreciated and no plan for surgical intervention at this 

time.


   -will follow the repeated blood cultures and echo.


   -ID following.


   


PVD


   - CTA runoff as noted above.


   -s/p vascular intervention as noted above; management per vascular surgery.





low BP's- continue with IV fluid- will monitor.





All other medical conditions stable, continue home meds as appropriate. 





DVT Prophylaxis:  Mechanical contraindication secondary to wound/cellulitis. 

Chemoprophylaxis on hold with  upcoming procedure











Enmanuel Dutta MD May 24, 2018 12:03

## 2018-05-25 VITALS
OXYGEN SATURATION: 100 % | RESPIRATION RATE: 17 BRPM | DIASTOLIC BLOOD PRESSURE: 78 MMHG | TEMPERATURE: 100.2 F | HEART RATE: 85 BPM | SYSTOLIC BLOOD PRESSURE: 133 MMHG

## 2018-05-25 VITALS
SYSTOLIC BLOOD PRESSURE: 139 MMHG | OXYGEN SATURATION: 100 % | HEART RATE: 86 BPM | RESPIRATION RATE: 26 BRPM | DIASTOLIC BLOOD PRESSURE: 93 MMHG | TEMPERATURE: 98.5 F

## 2018-05-25 VITALS
HEART RATE: 96 BPM | TEMPERATURE: 98.3 F | RESPIRATION RATE: 20 BRPM | DIASTOLIC BLOOD PRESSURE: 62 MMHG | SYSTOLIC BLOOD PRESSURE: 123 MMHG | OXYGEN SATURATION: 100 %

## 2018-05-25 VITALS
DIASTOLIC BLOOD PRESSURE: 66 MMHG | RESPIRATION RATE: 25 BRPM | OXYGEN SATURATION: 98 % | TEMPERATURE: 98.7 F | HEART RATE: 86 BPM | SYSTOLIC BLOOD PRESSURE: 114 MMHG

## 2018-05-25 VITALS
RESPIRATION RATE: 23 BRPM | HEART RATE: 82 BPM | DIASTOLIC BLOOD PRESSURE: 73 MMHG | SYSTOLIC BLOOD PRESSURE: 115 MMHG | OXYGEN SATURATION: 100 % | TEMPERATURE: 98.6 F

## 2018-05-25 VITALS
SYSTOLIC BLOOD PRESSURE: 131 MMHG | HEART RATE: 94 BPM | RESPIRATION RATE: 15 BRPM | DIASTOLIC BLOOD PRESSURE: 86 MMHG | TEMPERATURE: 98.1 F | OXYGEN SATURATION: 99 %

## 2018-05-25 VITALS
HEART RATE: 88 BPM | RESPIRATION RATE: 21 BRPM | SYSTOLIC BLOOD PRESSURE: 145 MMHG | DIASTOLIC BLOOD PRESSURE: 84 MMHG | TEMPERATURE: 98.6 F | OXYGEN SATURATION: 100 %

## 2018-05-25 VITALS — HEART RATE: 80 BPM

## 2018-05-25 VITALS — HEART RATE: 82 BPM

## 2018-05-25 RX ADMIN — CEFAZOLIN SODIUM SCH MLS/HR: 2 SOLUTION INTRAVENOUS at 05:01

## 2018-05-25 RX ADMIN — Medication SCH ML: at 09:20

## 2018-05-25 RX ADMIN — CEFAZOLIN SODIUM SCH MLS/HR: 2 SOLUTION INTRAVENOUS at 21:01

## 2018-05-25 RX ADMIN — CEFAZOLIN SODIUM SCH MLS/HR: 2 SOLUTION INTRAVENOUS at 13:01

## 2018-05-25 RX ADMIN — Medication SCH ML: at 21:02

## 2018-05-25 RX ADMIN — CLOPIDOGREL BISULFATE SCH MG: 75 TABLET, FILM COATED ORAL at 09:19

## 2018-05-25 NOTE — RADRPT
EXAM DATE:  5/25/2018 4:59 PM EDT

AGE/SEX:        62 years / Male



INDICATIONS:  Patient with history of bilateral peripheral vascular disease in need of angiogram with
 interventions.



CLINICAL DATA:  This is the patient's initial encounter. Patient reports that signs and symptoms have
 been present for 4 - 6 days and indicates a pain score of 7/10. 

                                                                          

MEDICAL/SURGICAL HISTORY:       Stroke.  Hypertension.  Cardiovascular disease.  HLD GERD BPH Coronar
y angioplasty and stent



COMPARISON:      No prior [facility] exams available for comparison.





IMAGE SERIES:               11

ACCESS SITE:























Anesthesia and pain control was provided by the Anesthesia department.



DEVICE(S):

Left common iliac artery stent (self expanding) 8MM

Left popliteal artery atherectomy device Silver hawk 

Left popliteal artery PTA balloon 5MM

Left superficial femoral artery atherectomy device Silver hawk 

Left superficial femoral artery PTA balloon 5MM

Left popliteal artery SpideRX embolic protection







 

. .



PROCEDURE :  

1.  Angiography of the left common iliac

2.  Angiography of the left SFA/popliteal

3. Stent and balloon angioplasty, left common iliac.

4.  LS atherectomy and 5 mm balloon angioplasty, left SFA/above-knee popliteal



The risks, benefits and alternatives to the procedure were explained and verbal and written consent w
as obtained.  The site was prepped in sterile fashion.  Full sterile technique was used, including ca
p, mask, sterile gloves and gown and a large sterile sheet.  Hand hygiene and 2% chlorhexidine and/or
 betadine/alcohol prep was utilized per protocol for cutaneous antisepsis.    



Procedure was done in conjunction with Dr. Orly Andrade MD. from the vascular service. Dr. Camila carson performed cutdowns on the common femoral arteries bilaterally in preparation for an extra darwin
omic femorofemoral bypass graft. However, patient had sequential stenosis in the left common/external
 iliac artery with short segment high-grade stenosis and occlusions in the adductor hiatus at the SFA
/popliteal junction also in the left lower extremity.



Initially, a retrograde access was obtained with a 21-gauge micropuncture needle. The L1 8 wire was a
dvanced through the needle over which the 3-4 dilator was placed. Through the outer 4 Malaysian dilator,
 an 035 wire was advanced into the distal aorta. 7 Malaysian side port sheath was advanced over the wire
, contrast injection showed atherosclerotic irregularity of the mid common iliac and proximal externa
l iliac artery with stenosis approaching 50%. Since this was going to be sole supply to both lower ex
tremities, we felt to be prudent to maximize inflow. Therefore, an 8 mm x 6 cm self-expanding stent w
as placed across the areas of stenosis and balloon angioplastied to 7 mm with an excellent angiograph
ic result.



With Dr. Andrade's assistance, the 7 Malaysian sheath was removed and replaced in a antegrade fashion.
 Contrast injection confirmed the short segment occlusion and high-grade stenosis near the adductor h
iatus in the distal SFA and proximal popliteal. The abnormal area was successfully traversed with a h
ockey-stick catheter and Roadrunner wire. Through the hockey-stick catheter, a 5 mm spider-X basket w
as advanced into the juxta-articular portion of the popliteal. Multiple passes with the LS atherectom
y device were then performed with marked improvement in the luminal diameter. The area was then ballo
on dilated to 5 mm. Final angiographic run showed restoration of brisk flow through the SFA/popliteal
 with no embolic event.  Dominant runoff is via the posterior tibial and peroneal proximal occlusion 
of the anterior tibial. The spider X basket was removed with the removal sheath provided.



The puncture site was closed with manual pressure and hemostasis was obtained. The patient tolerated 
the procedure well and there were no complications.



Anesthesia was provided by the anesthesia department.





CONCLUSION: 

1.  Segmental inflow stenosis in the left common iliac and proximal external iliac treated with 8 mm 
stent and 7 mm balloon angioplasty.

2.  Short segment occlusion with multiple segments of high-grade stenosis at the adductor hiatus brid
ging the distal SFA and proximal above-knee popliteal. This area was successfully treated with LS ath
erectomy and 5 mm balloon angioplasty as detailed above.



Electronically signed by: Jhon Tam MD  5/25/2018 6:29 PM EDT

## 2018-05-25 NOTE — PD.CAR.PN
CVT Progress Note


Subjective/Hospital Course:


Patient seen


Full consult dictated


CTA with runoff ordered and pending


Thanks


LORI


5/21/2018


CTA with a runoff was performed and reviewed it.  CTA confirms the clinical 

findings and gives more detail as to the nature of patient's disease


Patient has severe peripheral vascular disease with inflow and outflow 

occlusive problems.


Right common iliac artery is occluded and then reconstitutes as external iliac 

artery to run into the groin.


Right common iliac profunda and SFA are occluded proximally and then there are 

several narrowings distally in the SFA


Patient has one-vessel runoff to the foot on the right the anterior tibial 

artery


Left external iliac is significantly stenosed but patent and then in the groin 

there is common femoral deep femoral and SFA near occlusion


Distal SFA is occluded in the adductor canal and patient has again one-vessel 

runoff to the foot via a posterior tibial


Summarized patient has severe peripheral vascular disease bilateral and in a 

deal case scenario patient with undergo aortobifemoral bypass with femoral 

endarterectomies and possible angioplasties of SFA.


Patient is clearly not a candidate for a surgical procedure of this size and 

extent.


Therefore patient will be taken to the operating room for a bilateral femoral 

endarterectomy and femorofemoral bypass with left external iliac balloon 

angioplasty and stent and left SFA balloon angioplasty


We will take patient to the operating room on Wednesday and I have explained 

the risks and benefits to the patient.


5/22/2018


As above noted patient has right common iliac occlusion and reconstitution 

distal to it while on the left side which is symptomatic patient has a severe 

stenosis throughout the short segment occlusion of SFA


We will take to the operating room tomorrow for femoral endarterectomy and 

common external iliac balloon angioplasty stents and SFA angioplasty


5/24/2018


Patient status post revascularization of the left leg by balloon angioplasty 

stenting of the inflow and outflow tracts, SFA arthrectomy and right leg 

endarterectomy and femorofemoral bypass


Patient is awake alert oriented


Excellent palpable femoral pulses bilateral


Incisions are clean and dry


Feet are nice and warm


Patient can be transferred to floor from my point and further care per Dr. Pool, and medicine


5/25/2018


Status post bilateral open and endovascular leg revascularization femorofemoral 

bypass


Incisions clean and dry


Patient doing very well at this time


Can be discharged from my point any time


Objective:





Vital Signs








  Date Time  Temp Pulse Resp B/P (MAP) Pulse Ox O2 Delivery O2 Flow Rate FiO2


 


5/25/18 07:00     97 Room Air  


 


5/25/18 06:00  82      


 


5/25/18 04:00  82      


 


5/25/18 04:00 98.6 82 23 115/73 (87) 100   


 


5/25/18 02:00  80      


 


5/25/18 00:00 98.7 86 25 114/66 (82) 98   


 


5/25/18 00:00  86      


 


5/24/18 22:00  90      


 


5/24/18 20:00 98.4 84 24 100/55 (70) 99   


 


5/24/18 20:00  84      


 


5/24/18 19:00     98 Room Air  


 


5/24/18 18:00  83      


 


5/24/18 16:00 99.2 88 22 98/56 (70) 100   


 


5/24/18 16:00  95      








Result Diagram:  


5/24/18 1201                                                                   

             5/24/18 1201














Orly Andrade MD May 25, 2018 15:39

## 2018-05-25 NOTE — HHI.PR
Subjective


Remarks


in no acute distress.


pain is controlled.


no fever.


BP trend noted.


d/w the RN.





Objective


Vitals





Vital Signs








  Date Time  Temp Pulse Resp B/P (MAP) Pulse Ox O2 Delivery O2 Flow Rate FiO2


 


5/25/18 06:00  82      


 


5/25/18 04:00  82      


 


5/25/18 04:00 98.6 82 23 115/73 (87) 100   


 


5/25/18 02:00  80      


 


5/25/18 00:00 98.7 86 25 114/66 (82) 98   


 


5/25/18 00:00  86      


 


5/24/18 22:00  90      


 


5/24/18 20:00 98.4 84 24 100/55 (70) 99   


 


5/24/18 20:00  84      


 


5/24/18 19:00     98 Room Air  


 


5/24/18 18:00  83      


 


5/24/18 16:00 99.2 88 22 98/56 (70) 100   


 


5/24/18 16:00  95      


 


5/24/18 14:00  93      


 


5/24/18 12:00  88      


 


5/24/18 12:00 98.8 88 19 75/50 (58) 99   





    Arterial Line    














I/O      


 


 5/24/18 5/24/18 5/24/18 5/25/18 5/25/18 5/25/18





 07:00 15:00 23:00 07:00 15:00 23:00


 


Intake Total 790 ml 487 ml 600 ml 350 ml  


 


Output Total 500 ml  50 ml 300 ml  


 


Balance 290 ml 487 ml 550 ml 50 ml  


 


      


 


Intake Oral 240 ml  500 ml 300 ml  


 


IV Total 550 ml 487 ml 100 ml 50 ml  


 


Output Urine Total 500 ml  50 ml 300 ml  


 


# Bowel Movements 0  0 0  








Result Diagram:  


5/24/18 1201                                                                   

             5/24/18 1201





Imaging





Last Impressions








Aorta w/Runoff CTA 5/20/18 0000 Signed





Impressions: 





 Service Date/Time:  Ron, May 20, 2018 17:02 - CONCLUSION:  1. Bladder is 





 severely distended which may reflect bladder outlet obstruction or neurogenic 





 bladder. Patient may benefit from Roman decompression. 2. No significant 

aortic 





 stenosis. 3. Occluded right common iliac origin with reconstitution of the 





 common femoral artery. 4. Moderate long segment stenosis of the left common 





 iliac artery and proximal left external iliac artery. 5. Heavily calcified 





 common femoral bifurcations bilaterally with diffusely diseased profunda. 6. 





 Diffusely diseased bilateral SFA with multiple severe stenoses of the right 

SFA 





 in the mid to distal thigh and short segment occlusion of the left SFA at the 





 adductor canal. 7. Limited two-vessel runoff bilaterally, as above.     

Yvan Sanon MD 


 


Foot X-Ray 5/18/18 0000 Signed





Impressions: 





 Service Date/Time:  Friday, May 18, 2018 21:46 - CONCLUSION:  Suspected 

chronic 





 deformity of the hindfoot.     Iván Shields MD 


 


Foot MRI 5/18/18 0000 Signed





Impressions: 





 Service Date/Time:  Saturday, May 19, 2018 00:12 - CONCLUSION:  1. No definite 





 evidence for osteomyelitis. No discrete abscess. 2. Unusual multifocal 





 subchondral marrow edema throughout the left foot and ankle as above. The 





 finding is nonspecific. It can be related to immobilization of the foot or 





 limited use of the foot.     Sumit Arteaga MD 








Objective Remarks


GENERAL: This is a well-nourished, well-developed patient, in no apparent 

distress.


CARDIOVASCULAR: Regular rate and regular rhythm without murmurs, gallops, or 

rubs. 


RESPIRATORY: Clear to auscultation. Breath sounds equal bilaterally. No wheezes

, rales, or rhonchi.  


GASTROINTESTINAL: Abdomen soft, non-tender, nondistended. 


Normal, active bowel sounds


MUSCULOSKELETAL: left foot is swollen-seems to be improving.


NEURO:  Alert & Oriented x4 to person, place, time, situation.  Moves all ext x4


Procedures


Left common iliac and external iliac balloon angioplasty and stent,  


left SFA Hawk 1 endarterectomy and balloon angioplasty, left external 


iliac and common femoral endarterectomy patch angioplasty, right 


external iliac and common femoral endarterectomy with patch 


angioplasty and femoral-femoral bypass.


Medications and IVs





Inpatient Medications


Acetaminophen (Tylenol) 650 mg Q6H  PRN PO FEVER/PAIN SCALE 1 TO 2;  Start 5/19/ 18 at 02:00


Acetaminophen/ Hydrocodone Bitart (Norco  5-325 Mg) 1 tab Q4H  PRN PO PAIN 

SCALE 3 TO 5 Last administered on 5/21/18at 22:19;  Start 5/19/18 at 02:00


Albumin Human 500 ml @  250 mls/hr NOW IV  Last administered on 5/24/18at 01:39

;  Start 5/24/18 at 01:45;  Stop 5/24/18 at 03:44;  Status DC


Bisacodyl (Dulcolax Supp) 10 mg DAILY  PRN RECTAL SEVERE CONSITIPATION;  Start 5 /19/18 at 02:00


Cefazolin Sodium/ Dextrose 50 ml @  100 mls/hr Q8H IV  Last administered on 5/25 /18at 05:01;  Start 5/23/18 at 21:00


Cefazolin/Sodium Chloride 100 ml @  200 mls/hr Q8H IV  Last administered on 5/23 /18at 04:34;  Start 5/20/18 at 20:00;  Stop 5/23/18 at 21:06;  Status DC


Clindamycin/ Sodium Chloride 50 ml @  100 mls/hr Q8H IV  Last administered on 5/ 19/18at 05:50;  Start 5/19/18 at 06:00;  Stop 5/19/18 at 10:37;  Status DC


Clopidogrel Bisulfate (Plavix) 75 mg DAILY PO  Last administered on 5/25/18at 09

:19;  Start 5/23/18 at 20:00


Lactulose (Lactulose Liq) 30 ml DAILY  PRN PO SEVERE CONSITIPATION;  Start 5/19/ 18 at 02:00


Magnesium Hydroxide (Milk Of Magnesia Liq) 30 ml Q12H  PRN PO Mild constipation

;  Start 5/19/18 at 02:00


Metoclopramide HCl (Reglan Inj) 5 mg Q6H  PRN IV PUSH NAUSEA OR VOMITING;  

Start 5/19/18 at 02:00


Miscellaneous Information (Misc Nursing Information) ALL NURSING DEPARTME... 

UNSCH  PRN .XX SEE LABEL COMMENTS;  Start 5/23/18 at 18:42;  Stop 5/24/18 at 18:

41;  Status DC


Miscellaneous Information ([facility] Ordered Lab Info) SPECIFIC LAB TO BE 

... ONCE  ONCE .XX ;  Start 5/22/18 at 11:45;  Stop 5/22/18 at 11:45;  

Status DC


Morphine Sulfate (Morphine Inj) 2 mg Q3H  PRN IV PUSH Pain 6-10 Last 

administered on 5/19/18at 12:07;  Start 5/19/18 at 02:15


Pharmacy Profile Note 0 ml @ 0 mls/hr UNSCH OTHER ;  Start 5/19/18 at 10:45;  

Stop 5/20/18 at 19:38;  Status DC


Senna/Docusate Sodium (Yeimi-Colace) 1 tab BID PO ;  Start 5/19/18 at 09:00;  

Status Future Hold


Sennosides (Senokot) 17.2 mg Q12H  PRN PO Moderate constipation;  Start 5/19/18 

at 02:00


Sodium Chloride 1,000 ml @  100 mls/hr Q10H IV  Last administered on 5/24/18at 

08:54;  Start 5/23/18 at 21:45;  Stop 5/24/18 at 14:16;  Status DC


Sodium Chloride (NS Flush) 2 ml BID IV FLUSH  Last administered on 5/25/18at 09:

20;  Start 5/19/18 at 09:00


Vancomycin HCl 1000 mg/Sodium Chloride 250 ml @  250 mls/hr Q24H IV  Last 

administered on 5/20/18at 11:48;  Start 5/19/18 at 12:00;  Stop 5/20/18 at 19:38

;  Status DC





A/P


Problem List:  


(1) Cellulitis of left foot


ICD Code:  L03.116 - Cellulitis of left lower limb


Status:  Acute


(2) Intractable pain


ICD Code:  R52 - Pain, unspecified


Status:  Acute


Assessment and Plan


Sepsis with Left Foot Cellulitis: Concern for septic joint vs osteomyelitis. 

Symptoms x1 month with worsening wound at left medial MTP and diffuse pain/

warmth/edema. WBC 14.5K, tachycardic, with suspected source-cellulitis vs 

septic joint. 


   -X-ray reviewed, shows suspected chronic deformity hindfoot


   -MRI Foot reviewed, no definite osteomyelitis, however unusual marrow edema 

throughout left foot and ankle


   -initial Blood cultures with one bottle positive for MSSA


   -Wound culture with MSSA


   -podiatry consult appreciated and no plan for surgical intervention at this 

time.


   -repeated blood cultures negative and echo with no vegetation


   -ID following.


   


PVD


   - CTA runoff as noted above.


   -s/p vascular intervention as noted above; management per vascular surgery.





low BP's-BP improved- will monitor.





All other medical conditions stable, continue home meds as appropriate. 





DVT Prophylaxis:  Mechanical contraindication secondary to wound/cellulitis. 

chemical prophylaxis when ok with surgery.











for transfer to floor.


Discharge Planning


when cleared by ID and vascular surgery.











Enmanuel Dutta MD May 25, 2018 10:58

## 2018-05-25 NOTE — HHI.FF
__________________________________________________





Infusion Therapy


Location of Infusion Therapy:  [facility] IV Infusion Order


Patient Information


Patient Weight


59.9 kg


Diagnosis:  


Coded Allergies:  


     No Known Allergies (Verified  Allergy, Unknown, 3/5/18)





Administer Medication


Cefazolin


2 grams IV


q 8 hours


Start Treatment:  May 25, 2018


Stop Treatment:  Jerome 15, 2018





Additional Information


Venous access:  PICC Line


Additional Instructions





[x] Peripheral flush and dressing changes per protocol


[x] Implanted port and central :


* Implanted port: 10 ml Normal Saline followed by 5 ml Heparin 100 units/ml 

Heparin flush


   after each use and monthly to maintain.


   [] May leave port accessed during therapy.


   [] May leave peripheral site accessed for duration of therapy.





[x] If patient has SOB or respiratory distress, check oxygen saturation. If 

less than 90% or clinical signs of respiratory distress, administer oxygen at 2 

L/min. via nasal cannula and notify physician.





[x] Anaphylaxis/Reaction orders:


* Stop infusion.


* Keep IV line open with saline flush.


* Notify physician.


* Monitor vital signs every 15 minutes until symptoms resolve.


* Check Oxygen saturation; Oxygen at 2 L/min. via nasal cannula if less than 90%

 or clinical signs of respiratory distress.


* Administer diphenhydramine (Benadryl) 25 mg IV STAT, (unless patient has 

received as pre-med). May repeat once, if necessary.


* Solu-Cortef 250 mg IVP over 30-60 seconds, use 100 mg vials for each 

dissolution.


* Epinephrine (1mg/1 ml) 0.3 mg subcutaneously or IVP now with any signs of 

respiratory distress.


* Check with physician for new additional pre-med orders if patient is re-

challenged or re-treated.


[x] May remove PICC line when treatment complete, after confirming with 

Physician.


[x] If the patient is admitted to the hospital, the ED, or transferred via [facility]

, complete transfer form including medication reconciliation order sheet.





Laboratory Tests


Weekly Labs:  CBC w/diff, Creatinine











Charu Stanley MD May 25, 2018 19:32

## 2018-05-25 NOTE — HHI.IDPN
Subjective


Subjective


Remarks


doing fimne


tolerates abx OK


no c/o


arfebrile


Antibiotics


cefazoline


Allergies:  


Coded Allergies:  


     No Known Allergies (Verified  Allergy, Unknown, 3/5/18)





Objective


.





Vital Signs








  Date Time  Temp Pulse Resp B/P (MAP) Pulse Ox O2 Delivery O2 Flow Rate FiO2


 


5/25/18 07:00     97 Room Air  


 


5/25/18 06:00  82      


 


5/25/18 04:00  82      


 


5/25/18 04:00 98.6 82 23 115/73 (87) 100   


 


5/25/18 02:00  80      


 


5/25/18 00:00 98.7 86 25 114/66 (82) 98   


 


5/25/18 00:00  86      


 


5/24/18 22:00  90      


 


5/24/18 20:00 98.4 84 24 100/55 (70) 99   


 


5/24/18 20:00  84      








.





Laboratory Tests








Test


  5/24/18


12:01


 


White Blood Count 11.1 TH/MM3 


 


Red Blood Count 4.13 MIL/MM3 


 


Hemoglobin 9.8 GM/DL 


 


Hematocrit 30.0 % 


 


Mean Corpuscular Volume 72.7 FL 


 


Mean Corpuscular Hemoglobin 23.7 PG 


 


Mean Corpuscular Hemoglobin


Concent 32.6 % 


 


 


Red Cell Distribution Width 16.1 % 


 


Platelet Count 187 TH/MM3 


 


Mean Platelet Volume 7.7 FL 


 


Neutrophils (%) (Auto) 77.2 % 


 


Lymphocytes (%) (Auto) 13.0 % 


 


Monocytes (%) (Auto) 8.9 % 


 


Eosinophils (%) (Auto) 0.2 % 


 


Basophils (%) (Auto) 0.7 % 


 


Neutrophils # (Auto) 8.6 TH/MM3 


 


Lymphocytes # (Auto) 1.4 TH/MM3 


 


Monocytes # (Auto) 1.0 TH/MM3 


 


Eosinophils # (Auto) 0.0 TH/MM3 


 


Basophils # (Auto) 0.1 TH/MM3 


 


CBC Comment DIFF FINAL 


 


Differential Comment  








Laboratory Tests








Test


  5/24/18


12:01


 


Blood Urea Nitrogen 8 MG/DL 


 


Creatinine 1.10 MG/DL 


 


Random Glucose 129 MG/DL 


 


Calcium Level 8.4 MG/DL 


 


Sodium Level 140 MEQ/L 


 


Potassium Level 3.7 MEQ/L 


 


Chloride Level 106 MEQ/L 


 


Carbon Dioxide Level 25.1 MEQ/L 


 


Anion Gap 9 MEQ/L 


 


Estimat Glomerular Filtration


Rate 82 ML/MIN 


 








Imaging





Last Impressions








Lower Extremity Angiography 5/23/18 0000 Signed





Impressions: 





 CONCLUSION: 





 1.  Segmental inflow stenosis in the left common iliac and proximal external il





 iac treated with 8 mm stent and 7 mm balloon angioplasty.





 2.  Short segment occlusion with multiple segments of high-grade stenosis at th





 e adductor hiatus bridging the distal SFA and proximal above-knee popliteal. Th





 is area was successfully treated with LS atherectomy and 5 mm balloon angioplas





 ty as detailed above.





  





 


 


Aorta w/Runoff CTA 5/20/18 0000 Signed





Impressions: 





 Service Date/Time:  Ron, May 20, 2018 17:02 - CONCLUSION:  1. Bladder is 





 severely distended which may reflect bladder outlet obstruction or neurogenic 





 bladder. Patient may benefit from Roman decompression. 2. No significant 

aortic 





 stenosis. 3. Occluded right common iliac origin with reconstitution of the 





 common femoral artery. 4. Moderate long segment stenosis of the left common 





 iliac artery and proximal left external iliac artery. 5. Heavily calcified 





 common femoral bifurcations bilaterally with diffusely diseased profunda. 6. 





 Diffusely diseased bilateral SFA with multiple severe stenoses of the right 

SFA 





 in the mid to distal thigh and short segment occlusion of the left SFA at the 





 adductor canal. 7. Limited two-vessel runoff bilaterally, as above.     

Yvan Sanon MD 


 


Foot X-Ray 5/18/18 0000 Signed





Impressions: 





 Service Date/Time:  Friday, May 18, 2018 21:46 - CONCLUSION:  Suspected 

chronic 





 deformity of the hindfoot.     Iván Shields MD 


 


Foot MRI 5/18/18 0000 Signed





Impressions: 





 Service Date/Time:  Saturday, May 19, 2018 00:12 - CONCLUSION:  1. No definite 





 evidence for osteomyelitis. No discrete abscess. 2. Unusual multifocal 





 subchondral marrow edema throughout the left foot and ankle as above. The 





 finding is nonspecific. It can be related to immobilization of the foot or 





 limited use of the foot.     Sumit Arteaga MD 








Physical Exam


CONSTITUTIONAL/GENERAL: This is a thin elderly male  patient, in no apparent 

distress.


TUBES/LINES/DRAINS:


SKIN: No jaundice, rashes, or lesions. Ecchymoses on upper extremities. No 

wounds seen anteriorly. Skin temperature appropriate. Not diaphoretic. 


 CARDIOVASCULAR: Regular rate and rhythm without murmurs, gallops, or rubs. No 

JVD. Peripheral pulses symmetric.


RESPIRATORY/CHEST: Symmetric, unlabored respirations. Clear to auscultation. 

Breath sounds equal bilaterally. No wheezes, rales, or rhonchi.  


GASTROINTESTINAL: Abdomen soft, non-tender, nondistended. No hepato-splenomegaly

, or palpable masses. No guarding. Bowel sounds present.


GENITOURINARY: Without palpable bladder distension.  


MUSCULOSKELETAL: Extremities without clubbing, cyanosis, 


Fett are warm


Non tender


! MT lesion is dry, edema , erythema resolved


 NEUROLOGICAL: Awake and alert. Baseline L hemiparesis . Follows commands. 

Confused. Moves all extremities.


PSYCHIATRIC: No obvious anxiety/depression. no apparent hallucinations or other 

psychotic thought process.





Assessment & Plan


Remarks


MSSA infected 1st MT wound 


? gout 


MSSA sepsis - 2/2 foot infection


    2 D echo w/o vegetations


Underlying moderate to severe PVD


? Bladder outlet obstruction





   cont  cefazolin


   fu repeat BC


     post void residuals


   complete 4 weeks of abx











Charu Stanley MD May 25, 2018 19:31

## 2018-05-26 VITALS
DIASTOLIC BLOOD PRESSURE: 50 MMHG | OXYGEN SATURATION: 99 % | TEMPERATURE: 99.1 F | HEART RATE: 91 BPM | SYSTOLIC BLOOD PRESSURE: 88 MMHG | RESPIRATION RATE: 18 BRPM

## 2018-05-26 VITALS
DIASTOLIC BLOOD PRESSURE: 62 MMHG | RESPIRATION RATE: 18 BRPM | SYSTOLIC BLOOD PRESSURE: 112 MMHG | OXYGEN SATURATION: 99 % | TEMPERATURE: 99.2 F | HEART RATE: 97 BPM

## 2018-05-26 VITALS
HEART RATE: 75 BPM | RESPIRATION RATE: 18 BRPM | OXYGEN SATURATION: 98 % | DIASTOLIC BLOOD PRESSURE: 53 MMHG | SYSTOLIC BLOOD PRESSURE: 128 MMHG | TEMPERATURE: 98.8 F

## 2018-05-26 VITALS — TEMPERATURE: 98.9 F

## 2018-05-26 VITALS
RESPIRATION RATE: 18 BRPM | SYSTOLIC BLOOD PRESSURE: 98 MMHG | DIASTOLIC BLOOD PRESSURE: 72 MMHG | HEART RATE: 78 BPM | OXYGEN SATURATION: 99 % | TEMPERATURE: 98.3 F

## 2018-05-26 VITALS — DIASTOLIC BLOOD PRESSURE: 54 MMHG | SYSTOLIC BLOOD PRESSURE: 87 MMHG

## 2018-05-26 VITALS
HEART RATE: 77 BPM | SYSTOLIC BLOOD PRESSURE: 100 MMHG | TEMPERATURE: 98.9 F | OXYGEN SATURATION: 99 % | DIASTOLIC BLOOD PRESSURE: 66 MMHG | RESPIRATION RATE: 18 BRPM

## 2018-05-26 VITALS — DIASTOLIC BLOOD PRESSURE: 64 MMHG | SYSTOLIC BLOOD PRESSURE: 99 MMHG

## 2018-05-26 LAB
ALBUMIN SERPL-MCNC: 2.9 GM/DL (ref 3.4–5)
ALP SERPL-CCNC: 62 U/L (ref 45–117)
ALT SERPL-CCNC: 11 U/L (ref 12–78)
AST SERPL-CCNC: 19 U/L (ref 15–37)
BASOPHILS # BLD AUTO: 0.1 TH/MM3 (ref 0–0.2)
BASOPHILS NFR BLD: 0.6 % (ref 0–2)
BILIRUB SERPL-MCNC: 0.4 MG/DL (ref 0.2–1)
BUN SERPL-MCNC: 13 MG/DL (ref 7–18)
CALCIUM SERPL-MCNC: 8.3 MG/DL (ref 8.5–10.1)
CHLORIDE SERPL-SCNC: 106 MEQ/L (ref 98–107)
COLOR UR: YELLOW
CREAT SERPL-MCNC: 0.99 MG/DL (ref 0.6–1.3)
EOSINOPHIL # BLD: 0.1 TH/MM3 (ref 0–0.4)
EOSINOPHIL NFR BLD: 0.7 % (ref 0–4)
ERYTHROCYTE [DISTWIDTH] IN BLOOD BY AUTOMATED COUNT: 16.2 % (ref 11.6–17.2)
GFR SERPLBLD BASED ON 1.73 SQ M-ARVRAT: 93 ML/MIN (ref 89–?)
GLUCOSE SERPL-MCNC: 133 MG/DL (ref 74–106)
GLUCOSE UR STRIP-MCNC: (no result) MG/DL
HCO3 BLD-SCNC: 28.5 MEQ/L (ref 21–32)
HCT VFR BLD CALC: 30.6 % (ref 39–51)
HGB BLD-MCNC: 9.9 GM/DL (ref 13–17)
HGB UR QL STRIP: (no result)
KETONES UR STRIP-MCNC: (no result) MG/DL
LYMPHOCYTES # BLD AUTO: 1.8 TH/MM3 (ref 1–4.8)
LYMPHOCYTES NFR BLD AUTO: 13.2 % (ref 9–44)
MCH RBC QN AUTO: 23.7 PG (ref 27–34)
MCHC RBC AUTO-ENTMCNC: 32.4 % (ref 32–36)
MCV RBC AUTO: 73.1 FL (ref 80–100)
MONOCYTE #: 1.3 TH/MM3 (ref 0–0.9)
MONOCYTES NFR BLD: 9.5 % (ref 0–8)
NEUTROPHILS # BLD AUTO: 10.2 TH/MM3 (ref 1.8–7.7)
NEUTROPHILS NFR BLD AUTO: 76 % (ref 16–70)
NITRITE UR QL STRIP: (no result)
PLATELET # BLD: 249 TH/MM3 (ref 150–450)
PMV BLD AUTO: 7.4 FL (ref 7–11)
PROT SERPL-MCNC: 6.8 GM/DL (ref 6.4–8.2)
RBC # BLD AUTO: 4.18 MIL/MM3 (ref 4.5–5.9)
SODIUM SERPL-SCNC: 142 MEQ/L (ref 136–145)
SP GR UR STRIP: 1.01 (ref 1–1.03)
SQUAMOUS #/AREA URNS HPF: 3 /HPF (ref 0–5)
URINE LEUKOCYTE ESTERASE: (no result)
WBC # BLD AUTO: 13.4 TH/MM3 (ref 4–11)

## 2018-05-26 RX ADMIN — HYDROCODONE BITARTRATE AND ACETAMINOPHEN PRN TAB: 5; 325 TABLET ORAL at 08:58

## 2018-05-26 RX ADMIN — Medication SCH ML: at 08:57

## 2018-05-26 RX ADMIN — Medication SCH ML: at 20:10

## 2018-05-26 RX ADMIN — CEFAZOLIN SODIUM SCH MLS/HR: 2 SOLUTION INTRAVENOUS at 06:14

## 2018-05-26 RX ADMIN — CEFAZOLIN SODIUM SCH MLS/HR: 2 SOLUTION INTRAVENOUS at 13:00

## 2018-05-26 RX ADMIN — CLOPIDOGREL BISULFATE SCH MG: 75 TABLET, FILM COATED ORAL at 08:58

## 2018-05-26 RX ADMIN — CEFAZOLIN SODIUM SCH MLS/HR: 2 SOLUTION INTRAVENOUS at 20:10

## 2018-05-26 NOTE — HHI.PR
Subjective


Remarks


No overnight events, however has been hypotensive for the last 3 hours.  

Asymptomatic, no headache, dizziness or lightheadedness.





Objective


Vitals





Vital Signs








  Date Time  Temp Pulse Resp B/P (MAP) Pulse Ox O2 Delivery O2 Flow Rate FiO2


 


5/26/18 11:50    87/54 (65)    


 


5/26/18 11:49 99.1 91 18 88/50 (63) 99   


 


5/26/18 09:06      Room Air  


 


5/26/18 07:43 98.3 78 18 98/72 (81) 99   


 


5/26/18 04:30 98.8 75 18 128/53 (78) 98   


 


5/26/18 00:50 98.9       


 


5/25/18 22:50      Room Air  


 


5/25/18 22:00 100.2 85 17 133/78 (96) 100   


 


5/25/18 20:00  86      


 


5/25/18 20:00 98.5 86 26 139/93 (108) 100   


 


5/25/18 19:00     100 Room Air  


 


5/25/18 16:00 98.1 94 15 131/86 (101) 99   


 


5/25/18 16:00  94      














I/O      


 


 5/25/18 5/25/18 5/25/18 5/26/18 5/26/18 5/26/18





 07:00 15:00 23:00 07:00 15:00 23:00


 


Intake Total 350 ml  520 ml 400 ml  


 


Output Total 300 ml  1125 ml 1000 ml  


 


Balance 50 ml  -605 ml -600 ml  


 


      


 


Intake Oral 300 ml  520 ml 400 ml  


 


IV Total 50 ml     


 


Output Urine Total 300 ml  1125 ml 1000 ml  


 


Bladder Scan Volume Amount     555 ml 


 


# Bowel Movements 0  0 0  








Result Diagram:  


5/24/18 1201                                                                   

             5/24/18 1201





Imaging





Last Impressions








Lower Extremity Angiography 5/23/18 0000 Signed





Impressions: 





 CONCLUSION: 





 1.  Segmental inflow stenosis in the left common iliac and proximal external il





 iac treated with 8 mm stent and 7 mm balloon angioplasty.





 2.  Short segment occlusion with multiple segments of high-grade stenosis at th





 e adductor hiatus bridging the distal SFA and proximal above-knee popliteal. Th





 is area was successfully treated with LS atherectomy and 5 mm balloon angioplas





 ty as detailed above.





  





 


 


Aorta w/Runoff CTA 5/20/18 0000 Signed





Impressions: 





 Service Date/Time:  Ron, May 20, 2018 17:02 - CONCLUSION:  1. Bladder is 





 severely distended which may reflect bladder outlet obstruction or neurogenic 





 bladder. Patient may benefit from Roman decompression. 2. No significant 

aortic 





 stenosis. 3. Occluded right common iliac origin with reconstitution of the 





 common femoral artery. 4. Moderate long segment stenosis of the left common 





 iliac artery and proximal left external iliac artery. 5. Heavily calcified 





 common femoral bifurcations bilaterally with diffusely diseased profunda. 6. 





 Diffusely diseased bilateral SFA with multiple severe stenoses of the right 

SFA 





 in the mid to distal thigh and short segment occlusion of the left SFA at the 





 adductor canal. 7. Limited two-vessel runoff bilaterally, as above.     

Yvan Sanon MD 


 


Foot X-Ray 5/18/18 0000 Signed





Impressions: 





 Service Date/Time:  Friday, May 18, 2018 21:46 - CONCLUSION:  Suspected 

chronic 





 deformity of the hindfoot.     Iván Shields MD 


 


Foot MRI 5/18/18 0000 Signed





Impressions: 





 Service Date/Time:  Saturday, May 19, 2018 00:12 - CONCLUSION:  1. No definite 





 evidence for osteomyelitis. No discrete abscess. 2. Unusual multifocal 





 subchondral marrow edema throughout the left foot and ankle as above. The 





 finding is nonspecific. It can be related to immobilization of the foot or 





 limited use of the foot.     Sumit Arteaga MD 








Objective Remarks


GENERAL: This is a well-nourished, well-developed patient, in no apparent 

distress.


CARDIOVASCULAR: Regular rate and regular rhythm without murmurs, gallops, or 

rubs. 


RESPIRATORY: Clear to auscultation. Breath sounds equal bilaterally. No wheezes

, rales, or rhonchi.  


GASTROINTESTINAL: Abdomen soft, non-tender, nondistended. 


Normal, active bowel sounds


MUSCULOSKELETAL: Left foot swelling improved, bilateral inguinal area, no 

significant hematoma.


NEURO:  Alert & Oriented x4 to person, place, time, situation.  Moves all ext x4


Procedures


Left common iliac and external iliac balloon angioplasty and stent,  


left SFA Hawk 1 endarterectomy and balloon angioplasty, left external 


iliac and common femoral endarterectomy patch angioplasty, right 


external iliac and common femoral endarterectomy with patch 


angioplasty and femoral-femoral bypass.





A/P


Problem List:  


(1) Cellulitis of left foot


ICD Code:  L03.116 - Cellulitis of left lower limb


Status:  Acute


(2) Intractable pain


ICD Code:  R52 - Pain, unspecified


Status:  Acute


Assessment and Plan





Sepsis with Left Foot Cellulitis: Concern for septic joint vs osteomyelitis. 

Symptoms x1 month with worsening wound at left medial MTP and diffuse pain/

warmth/edema. WBC 14.5K, tachycardic, with suspected source-cellulitis vs 

septic joint. 


   -X-ray reviewed, shows suspected chronic deformity hindfoot


   -MRI Foot reviewed, no definite osteomyelitis, however unusual marrow edema 

throughout left foot and ankle


   -initial Blood cultures with one bottle positive for MSSA


   -Wound culture with MSSA


   -podiatry consult appreciated and no plan for surgical intervention at this 

time.


   -repeated blood cultures negative and echo with no vegetation


   -ID following, continue cefazolin for 4 weeks.


   


PVD


   - CTA runoff as noted above.


   -s/p bilateral femoral-femoral bypass, treated with vascular surgery.  

Continue Plavix.





Hypotension-unknown etiology, rule out sepsis, check lactic acid, recheck CBC, 

recheck blood cultures, check urinalysis.  Could also be secondary to bladder 

outlet obstruction, insert Roman catheter / straight cath, start normal saline 

500 cc bolus.





All other medical conditions stable, continue home meds as appropriate. 





DVT Prophylaxis:  Mechanical contraindication secondary to wound/cellulitis. 

chemical prophylaxis tomorrow when stable.











Zaida Meng MD May 26, 2018 12:21

## 2018-05-27 VITALS
DIASTOLIC BLOOD PRESSURE: 75 MMHG | SYSTOLIC BLOOD PRESSURE: 105 MMHG | OXYGEN SATURATION: 98 % | TEMPERATURE: 99 F | RESPIRATION RATE: 18 BRPM | HEART RATE: 91 BPM

## 2018-05-27 VITALS
RESPIRATION RATE: 18 BRPM | TEMPERATURE: 99 F | SYSTOLIC BLOOD PRESSURE: 123 MMHG | HEART RATE: 90 BPM | DIASTOLIC BLOOD PRESSURE: 95 MMHG | OXYGEN SATURATION: 99 %

## 2018-05-27 VITALS
RESPIRATION RATE: 18 BRPM | HEART RATE: 95 BPM | OXYGEN SATURATION: 95 % | SYSTOLIC BLOOD PRESSURE: 81 MMHG | TEMPERATURE: 98.4 F | DIASTOLIC BLOOD PRESSURE: 52 MMHG

## 2018-05-27 VITALS
DIASTOLIC BLOOD PRESSURE: 69 MMHG | HEART RATE: 78 BPM | SYSTOLIC BLOOD PRESSURE: 107 MMHG | OXYGEN SATURATION: 98 % | TEMPERATURE: 97.4 F | RESPIRATION RATE: 18 BRPM

## 2018-05-27 VITALS
HEART RATE: 89 BPM | DIASTOLIC BLOOD PRESSURE: 60 MMHG | RESPIRATION RATE: 18 BRPM | TEMPERATURE: 99.6 F | OXYGEN SATURATION: 97 % | SYSTOLIC BLOOD PRESSURE: 120 MMHG

## 2018-05-27 VITALS — SYSTOLIC BLOOD PRESSURE: 137 MMHG | DIASTOLIC BLOOD PRESSURE: 72 MMHG

## 2018-05-27 VITALS
SYSTOLIC BLOOD PRESSURE: 89 MMHG | OXYGEN SATURATION: 98 % | RESPIRATION RATE: 18 BRPM | DIASTOLIC BLOOD PRESSURE: 57 MMHG | TEMPERATURE: 98.6 F | HEART RATE: 85 BPM

## 2018-05-27 VITALS — SYSTOLIC BLOOD PRESSURE: 121 MMHG | DIASTOLIC BLOOD PRESSURE: 69 MMHG

## 2018-05-27 LAB
ERYTHROCYTE [DISTWIDTH] IN BLOOD BY AUTOMATED COUNT: 16.3 % (ref 11.6–17.2)
HCT VFR BLD CALC: 32.6 % (ref 39–51)
HGB BLD-MCNC: 10.4 GM/DL (ref 13–17)
MCH RBC QN AUTO: 23.5 PG (ref 27–34)
MCHC RBC AUTO-ENTMCNC: 32 % (ref 32–36)
MCV RBC AUTO: 73.4 FL (ref 80–100)
PLATELET # BLD: 288 TH/MM3 (ref 150–450)
PMV BLD AUTO: 7.5 FL (ref 7–11)
RBC # BLD AUTO: 4.44 MIL/MM3 (ref 4.5–5.9)
WBC # BLD AUTO: 11.4 TH/MM3 (ref 4–11)

## 2018-05-27 RX ADMIN — CEFAZOLIN SODIUM SCH MLS/HR: 2 SOLUTION INTRAVENOUS at 13:00

## 2018-05-27 RX ADMIN — CLOPIDOGREL BISULFATE SCH MG: 75 TABLET, FILM COATED ORAL at 08:00

## 2018-05-27 RX ADMIN — Medication SCH ML: at 21:16

## 2018-05-27 RX ADMIN — CEFAZOLIN SODIUM SCH MLS/HR: 2 SOLUTION INTRAVENOUS at 21:15

## 2018-05-27 RX ADMIN — CEFAZOLIN SODIUM SCH MLS/HR: 2 SOLUTION INTRAVENOUS at 04:54

## 2018-05-27 RX ADMIN — Medication SCH ML: at 08:58

## 2018-05-27 NOTE — HHI.PR
Subjective


Remarks


Follow-up for hypertension, urinary retention





Yesterday, patient was hypotensive in the 80s systolic, improved to 100s after 

fluid bolus and urinary drainage.  Bladder scan was done showed 550 cc of urine

, a Roman catheter was inserted, 1100 cc of bloody urine was drained, 

intermittent irrigation was done, urine is now more clear, yellowish.  Patient 

denies any suprapubic point at this point, no fever or chills.  Hemoglobin is 

stable.





Objective


Vitals





Vital Signs








  Date Time  Temp Pulse Resp B/P (MAP) Pulse Ox O2 Delivery O2 Flow Rate FiO2


 


5/27/18 08:02 97.4 78 18 107/69 (82) 98   


 


5/27/18 07:15      Room Air  


 


5/27/18 05:53 99.0 90 18 123/95 (104) 99   


 


5/27/18 00:39 99.0 91 18 105/75 (85) 98   


 


5/26/18 20:10      Room Air  


 


5/26/18 20:05 99.2 97 18 112/62 (79) 99   


 


5/26/18 16:03 98.9 77 18 100/66 (77) 99   


 


5/26/18 13:51    99/64 (76)    


 


5/26/18 11:50    87/54 (65)    


 


5/26/18 11:49 99.1 91 18 88/50 (63) 99   














I/O      


 


 5/26/18 5/26/18 5/26/18 5/27/18 5/27/18 5/27/18





 07:00 15:00 23:00 07:00 15:00 23:00


 


Intake Total 400 ml 550 ml    


 


Output Total 1000 ml  1325 ml 550 ml 575 ml 


 


Balance -600 ml 550 ml -1325 ml -550 ml -575 ml 


 


      


 


Intake Oral 400 ml     


 


IV Total  550 ml    


 


Output Urine Total 1000 ml  1325 ml 550 ml 575 ml 


 


Bladder Scan Volume Amount  555 ml    


 


# Bowel Movements 0 1 6   








Result Diagram:  


5/27/18 0700                                                                   

             5/26/18 1300





Objective Remarks


GENERAL: Not in distress


CARDIOVASCULAR: Regular rate and regular rhythm without murmurs, gallops, or 

rubs. 


RESPIRATORY: Clear to auscultation. Breath sounds equal bilaterally. No wheezes

, rales, or rhonchi.  


GASTROINTESTINAL: Abdomen soft, mild suprapubic tenderness, Roman catheter in 

place, draining yellowish urine, previously bloody.


MUSCULOSKELETAL: Left foot swelling improved, bilateral inguinal area, no 

significant hematoma.


NEURO:  Alert & Oriented x4 to person, place, time, situation.  Left hemiparesis

,


Procedures


Left common iliac and external iliac balloon angioplasty and stent,  


left SFA Hawk 1 endarterectomy and balloon angioplasty, left external 


iliac and common femoral endarterectomy patch angioplasty, right 


external iliac and common femoral endarterectomy with patch 


angioplasty and femoral-femoral bypass.





A/P


Problem List:  


(1) Cellulitis of left foot


ICD Code:  L03.116 - Cellulitis of left lower limb


Status:  Acute


(2) Intractable pain


ICD Code:  R52 - Pain, unspecified


Status:  Acute


Assessment and Plan


This is a 62-year-old male with a PMH of HTN, Hyperlipidemia, BPH and h/o CVA w

/ Left-Sided Weakness who was brought to the ER for left foot pain and 

swelling. 





Sepsis with Left Foot Cellulitis: Concern for septic joint vs osteomyelitis. 

Symptoms x1 month with worsening wound at left medial MTP and diffuse pain/

warmth/edema. X-ray reviewed, shows suspected chronic deformity hindfoot. MRI 

Foot reviewed, no definite osteomyelitis, however unusual marrow edema 

throughout left foot and ankle.  Wound culture and blood culture grew MSSA.  

Podiatry was consulted, no surgical intervention at this point.  Infectious 

disease was consulted, recommended cefazolin for 4 weeks.


   


   


PVD-secondary to concern of peripheral vascular disease, vascular surgery was 

consulted, CTA runoff was done, showed occluded right common iliac origin, 

moderate long segment stenosis of the left common iliac artery and proximal 

left external iliac with heavy calcification of the common femoral bifurcation 

bilaterally.  Patient went for bilateral femoral-femoral bypass, started on 

Plavix.


   


Hypotension-unknown etiology, rule out worsening sepsis, blood cultures were 

repeated, CBC was unremarkable, WBC elevated but unchanged.  Lactic acid was 

normal.  Urinalysis was suspicious, follow-up urine culture.  Blood pressure is 

now better, continue cefazolin, will discuss with infectious disease.





Acute urinary retention-bladder scan showed urinary retention about 500 cc, 

after Roman catheter placement, there was 1100 cc of drainable urine, mostly 

bloody, intermittent irrigation was done, urine is now more clear, consult 

urology.  Recent CT scan of the abdomen was done, bladder distention was noted, 

will defer further imaging to urology.





All other medical conditions stable, continue home meds as appropriate. 





DVT Prophylaxis:  Mechanical contraindication secondary to wound/cellulitis. 

chemical prophylaxis when bleeding from the urinary tract is stable.











Zaida Meng MD May 27, 2018 11:23

## 2018-05-28 VITALS
OXYGEN SATURATION: 99 % | TEMPERATURE: 98.1 F | SYSTOLIC BLOOD PRESSURE: 119 MMHG | DIASTOLIC BLOOD PRESSURE: 80 MMHG | RESPIRATION RATE: 15 BRPM | HEART RATE: 76 BPM

## 2018-05-28 VITALS
SYSTOLIC BLOOD PRESSURE: 150 MMHG | RESPIRATION RATE: 18 BRPM | TEMPERATURE: 98.2 F | DIASTOLIC BLOOD PRESSURE: 95 MMHG | OXYGEN SATURATION: 100 % | HEART RATE: 94 BPM

## 2018-05-28 VITALS
HEART RATE: 97 BPM | SYSTOLIC BLOOD PRESSURE: 141 MMHG | TEMPERATURE: 98.5 F | RESPIRATION RATE: 20 BRPM | OXYGEN SATURATION: 99 % | DIASTOLIC BLOOD PRESSURE: 77 MMHG

## 2018-05-28 VITALS
HEART RATE: 89 BPM | TEMPERATURE: 97.7 F | DIASTOLIC BLOOD PRESSURE: 73 MMHG | RESPIRATION RATE: 18 BRPM | OXYGEN SATURATION: 99 % | SYSTOLIC BLOOD PRESSURE: 129 MMHG

## 2018-05-28 VITALS
DIASTOLIC BLOOD PRESSURE: 58 MMHG | OXYGEN SATURATION: 97 % | RESPIRATION RATE: 18 BRPM | TEMPERATURE: 99.4 F | HEART RATE: 81 BPM | SYSTOLIC BLOOD PRESSURE: 98 MMHG

## 2018-05-28 VITALS
SYSTOLIC BLOOD PRESSURE: 103 MMHG | RESPIRATION RATE: 18 BRPM | HEART RATE: 78 BPM | OXYGEN SATURATION: 98 % | TEMPERATURE: 98.8 F | DIASTOLIC BLOOD PRESSURE: 57 MMHG

## 2018-05-28 LAB
BASOPHILS # BLD AUTO: 0 TH/MM3 (ref 0–0.2)
BASOPHILS NFR BLD: 0.4 % (ref 0–2)
EOSINOPHIL # BLD: 0.1 TH/MM3 (ref 0–0.4)
EOSINOPHIL NFR BLD: 1.3 % (ref 0–4)
ERYTHROCYTE [DISTWIDTH] IN BLOOD BY AUTOMATED COUNT: 16.1 % (ref 11.6–17.2)
HCT VFR BLD CALC: 33.4 % (ref 39–51)
HGB BLD-MCNC: 11 GM/DL (ref 13–17)
LYMPHOCYTES # BLD AUTO: 2.7 TH/MM3 (ref 1–4.8)
LYMPHOCYTES NFR BLD AUTO: 27.6 % (ref 9–44)
MCH RBC QN AUTO: 23.9 PG (ref 27–34)
MCHC RBC AUTO-ENTMCNC: 32.7 % (ref 32–36)
MCV RBC AUTO: 73 FL (ref 80–100)
MONOCYTE #: 1.1 TH/MM3 (ref 0–0.9)
MONOCYTES NFR BLD: 11.6 % (ref 0–8)
NEUTROPHILS # BLD AUTO: 5.7 TH/MM3 (ref 1.8–7.7)
NEUTROPHILS NFR BLD AUTO: 59.1 % (ref 16–70)
PLATELET # BLD: 304 TH/MM3 (ref 150–450)
PMV BLD AUTO: 7.4 FL (ref 7–11)
RBC # BLD AUTO: 4.58 MIL/MM3 (ref 4.5–5.9)
WBC # BLD AUTO: 9.6 TH/MM3 (ref 4–11)

## 2018-05-28 RX ADMIN — CEFAZOLIN SODIUM SCH MLS/HR: 2 SOLUTION INTRAVENOUS at 06:29

## 2018-05-28 RX ADMIN — Medication SCH ML: at 11:31

## 2018-05-28 RX ADMIN — Medication SCH ML: at 22:09

## 2018-05-28 RX ADMIN — TAMSULOSIN HYDROCHLORIDE SCH MG: 0.4 CAPSULE ORAL at 11:37

## 2018-05-28 RX ADMIN — CEFAZOLIN SODIUM SCH MLS/HR: 2 SOLUTION INTRAVENOUS at 11:39

## 2018-05-28 RX ADMIN — CLOPIDOGREL BISULFATE SCH MG: 75 TABLET, FILM COATED ORAL at 11:31

## 2018-05-28 RX ADMIN — CEFAZOLIN SODIUM SCH MLS/HR: 2 SOLUTION INTRAVENOUS at 22:09

## 2018-05-28 NOTE — HHI.PR
Subjective


Remarks


Mr. Mohan is doing well today.  No complaints.  His foot has gradually 

improved.  He does have hematuria which is still present and not stable for 

discharge.





Objective





Vital Signs








  Date Time  Temp Pulse Resp B/P (MAP) Pulse Ox O2 Delivery O2 Flow Rate FiO2


 


5/28/18 12:04      Room Air  


 


5/28/18 12:00 97.7 89 18 129/73 (91) 99   


 


5/28/18 09:00 98.1 76 15 119/80 (93) 99   


 


5/28/18 04:53 98.8 78 18 103/57 (72) 98   


 


5/28/18 00:56 99.4 81 18 98/58 (71) 97   


 


5/27/18 21:15      Room Air  


 


5/27/18 20:30 99.6 89 18 120/60 (80) 97   


 


5/27/18 17:07    137/72 (93)    


 


5/27/18 15:48 98.4 95 18 81/52 (62) 95   














I/O      


 


 5/27/18 5/27/18 5/27/18 5/28/18 5/28/18 5/28/18





 06:59 14:59 22:59 06:59 14:59 22:59


 


Intake Total  50 ml    


 


Output Total 550 ml 575 ml 375 ml 300 ml  


 


Balance -550 ml -525 ml -375 ml -300 ml  


 


      


 


IV Total  50 ml    


 


Output Urine Total 550 ml 575 ml 375 ml 300 ml  


 


# Bowel Movements   6   








Result Diagram:  


5/28/18 0628                                                                   

             5/26/18 1300





Objective Remarks


GENERAL: NAD, A&Ox3


HEAD: Normocephalic. 


NECK: Supple, trachea midline. No lymphadenopathy.


EYES: No scleral icterus. No injection or drainage. 


CARDIOVASCULAR: Regular rate and rhythm without murmurs, gallops, or rubs. 


RESPIRATORY: Breath sounds equal bilaterally. No accessory muscle use.


GASTROINTESTINAL: Abdomen soft, non-tender, nondistended. 


MUSCULOSKELETAL: No cyanosis, or edema. 


SKIN: Warm and dry.


NEURO:  No focal neurological deficitis.





A/P


Problem List:  


(1) Cellulitis of left foot


ICD Code:  L03.116 - Cellulitis of left lower limb


Status:  Acute


(2) Hypotension


ICD Code:  I95.9 - Hypotension


Status:  Acute


(3) Hematuria, gross


ICD Code:  R31.0 - Hematuria, gross


Status:  Acute


Assessment and Plan


62-year-old male admitted for left foot pain and swelling. 





Sepsis


Resolved 





Left Foot Cellulitis


septic joint vs osteomyelitis


Continue cefazolin for 4 weeks


Podiatry following


ID following





Peripheral vascular disease


History of femorofemoral bypass


PVD contributory to limb/foot disease


Continue Plavix





Hypotension


Resolved





Urinary obstruction 


hematuria


Follow for improvement


Monitor renal function





DVT prophylaxis


SCDs


No chemoprophylaxis due to active bleeding











Moiz Brizuela MD May 28, 2018 15:52

## 2018-05-28 NOTE — MB
cc:

Grabiel Cortez MD

****

 

 

DATE:

05/28/2018

 

REASON FOR CONSULTATION:

1.  Urinary retention.

2.  Benign prostatic hypertrophy.

 

HISTORY OF PRESENT ILLNESS:

The patient is a 62-year-old  male with a history of a

previous stroke in the past and known BPH, who was admitted to [facility] on 05/19/2018 with increasing pain for over a month of his 

left great toe.  During evaluation, he had a CTA with runoff, which 

showed a severely distended bladder, but no evidence of any 

hydronephrosis.  His urine had been intermittent on and off for the 

last 8 days a week.  He has been wearing a condom catheter with 

decreasing output.  Eventually, due to the CT scan findings, he had a 

Roman catheter placed with over 1100 mL of pink-tinged urine returned.

 However, once his catheter was placed, the patient states he 

initially felt some relief in his lower abdominal area.  The patient 

states he has had trouble voiding for months urinating quite 

frequently, especially at night going 4-5 times with a weak stream and

occasionally leaking on himself.  He denies a history of hematuria, 

dysuria, fevers, chills, abdominal pain, or flank pain.  Denies a 

history of kidney stones or urinary tract infections.  He denies prior

surgery on his prostate as well.

 

REVIEW OF SYSTEMS:

See HPI, otherwise all systems reviewed, otherwise were negative.

 

PAST MEDICAL HISTORY:

Significant for hypertension, hyperlipidemia, BPH, history of stroke 

with left-sided weakness.

 

PAST SURGICAL HISTORY:

He has had cardiac stents and circumcision.

 

ALLERGIES:

NO KNOWN DRUG ALLERGIES.

 

FAMILY HISTORY:

Denies urolithiasis or genitourinary malignancy.

 

SOCIAL HISTORY:

Negative for alcohol, tobacco or illicit drugs.

 

MEDICATIONS:

1.  Plavix 75 mg daily.

2.  Reglan 5 mg IV.

3.  Hydrocodone.

4.  Ancef.

 

PHYSICAL EXAMINATION:

VITAL SIGNS:  Temperature 98.1, pulse 76, respiration rate 15, BP 

119/80, saturating 99% on room air.

GENERAL:  He is alert and oriented x 3 in no apparent distress, 

pleasant and cooperative gentleman who appears his stated age.

HEAD: head is normocephalic, atraumatic.

NECK:  Supple.  Trachea is midline.  No JVD.

EYES:  No sclerae icterus.  Extraocular muscles intact.  External 

auditory hearing is normal.  External nares are normal.

LUNGS:  Clear to auscultation bilaterally.  No wheezes, rales, 

rhonchi.

HEART:  Regular rhythm.  No murmurs, gallops or rubs.

ABDOMEN:  Soft, nontender, nondistended, positive bowel sounds.

GENITOURINARY:  His penis is circumcised.  Testes descended 

bilaterally, normal size and consistency without mass.  Roman catheter

draining clear yellow urine.

RECTAL:  Exam not indicated.

EXTREMITIES:  Nontender.  No clubbing, cyanosis or edema.

NEUROLOGIC:  Cranial nerves II through XII intact.  Strength 3/5 in 

the left upper and lower extremities, 5/5 in right upper and lower 

extremities.

SKIN:  No ulcers or rashes visible.  Mucus membranes are warm and 

moist.

PSYCHIATRIC:  Slightly flat affect.  Answers questions appropriately.

 

LABORATORY DATA:

White count 9.6, hemoglobin 11.0, hematocrit 33.4, platelet count 304.

 

 

Sodium 142, potassium 3.3, chloride 106, bicarbonate 28.5, BUN 13, 

creatinine 0.99, glucose 133, calcium 8.3.

 

His urine showed small blood.

 

IMAGING STUDIES:

CTA with runoff images were reviewed and interpreted on my own.  He 

does have a severely distended bladder with a thickened muscular wall,

but no evidence of any hydronephrosis, no evidence of any kidney 

stones or renal masses.

 

ASSESSMENT:

The patient is a 62-year-old male with a history of a stroke in the 

past with residual left-sided weakness, BPH with lower urinary tract 

symptoms who presents with urinary retention.

 

PLAN:

We will start the patient on Flomax 0.4 mg daily.  Recommend 

continuing the Roman catheter and will void trial on an outpatient 

basis.  He will possibly need urodynamics and a cystoscopy in the 

future due to his prior history of neurologic disease.

 

Thank you for this consult.  Please call with any questions.  I will 

be available as needed.

 

 

__________________________________

MD PATTI Manriquez/MERISSA

D: 05/28/2018, 09:58 AM

T: 05/28/2018, 10:24 AM

Visit #: [id number]

Job #: [id number]

## 2018-05-28 NOTE — HHI.IDPN
Subjective


Subjective


Remarks


afebrile


555 bladder residuals, now with chavez


BMs 6-7 


BC with Strep spp in 1/4  bottles, had fever 2 days ago


Antibiotics


cefazoline


Allergies:  


Coded Allergies:  


     No Known Allergies (Verified  Allergy, Unknown, 3/5/18)





Objective


.





Vital Signs








  Date Time  Temp Pulse Resp B/P (MAP) Pulse Ox O2 Delivery O2 Flow Rate FiO2


 


5/28/18 12:04      Room Air  


 


5/28/18 09:00 98.1 76 15 119/80 (93) 99   


 


5/28/18 04:53 98.8 78 18 103/57 (72) 98   


 


5/28/18 00:56 99.4 81 18 98/58 (71) 97   


 


5/27/18 21:15      Room Air  


 


5/27/18 20:30 99.6 89 18 120/60 (80) 97   


 


5/27/18 17:07    137/72 (93)    


 


5/27/18 15:48 98.4 95 18 81/52 (62) 95   








.





Laboratory Tests








Test


  5/26/18


13:00 5/27/18


07:00 5/28/18


06:28


 


White Blood Count 13.4 TH/MM3  11.4 TH/MM3  9.6 TH/MM3 


 


Red Blood Count 4.18 MIL/MM3  4.44 MIL/MM3  4.58 MIL/MM3 


 


Hemoglobin 9.9 GM/DL  10.4 GM/DL  11.0 GM/DL 


 


Hematocrit 30.6 %  32.6 %  33.4 % 


 


Mean Corpuscular Volume 73.1 FL  73.4 FL  73.0 FL 


 


Mean Corpuscular Hemoglobin 23.7 PG  23.5 PG  23.9 PG 


 


Mean Corpuscular Hemoglobin


Concent 32.4 % 


  32.0 % 


  32.7 % 


 


 


Red Cell Distribution Width 16.2 %  16.3 %  16.1 % 


 


Platelet Count 249 TH/MM3  288 TH/MM3  304 TH/MM3 


 


Mean Platelet Volume 7.4 FL  7.5 FL  7.4 FL 


 


Neutrophils (%) (Auto) 76.0 %   59.1 % 


 


Lymphocytes (%) (Auto) 13.2 %   27.6 % 


 


Monocytes (%) (Auto) 9.5 %   11.6 % 


 


Eosinophils (%) (Auto) 0.7 %   1.3 % 


 


Basophils (%) (Auto) 0.6 %   0.4 % 


 


Neutrophils # (Auto) 10.2 TH/MM3   5.7 TH/MM3 


 


Lymphocytes # (Auto) 1.8 TH/MM3   2.7 TH/MM3 


 


Monocytes # (Auto) 1.3 TH/MM3   1.1 TH/MM3 


 


Eosinophils # (Auto) 0.1 TH/MM3   0.1 TH/MM3 


 


Basophils # (Auto) 0.1 TH/MM3   0.0 TH/MM3 


 


CBC Comment DIFF FINAL   DIFF FINAL 


 


Differential Comment     








Laboratory Tests








Test


  5/26/18


13:00


 


Blood Urea Nitrogen 13 MG/DL 


 


Creatinine 0.99 MG/DL 


 


Random Glucose 133 MG/DL 


 


Total Protein 6.8 GM/DL 


 


Albumin 2.9 GM/DL 


 


Calcium Level 8.3 MG/DL 


 


Alkaline Phosphatase 62 U/L 


 


Aspartate Amino Transf


(AST/SGOT) 19 U/L 


 


 


Alanine Aminotransferase


(ALT/SGPT) 11 U/L 


 


 


Total Bilirubin 0.4 MG/DL 


 


Sodium Level 142 MEQ/L 


 


Potassium Level 3.3 MEQ/L 


 


Chloride Level 106 MEQ/L 


 


Carbon Dioxide Level 28.5 MEQ/L 


 


Anion Gap 8 MEQ/L 


 


Estimat Glomerular Filtration


Rate 93 ML/MIN 


 


 


Lactic Acid Level 1.5 mmol/L 








Microbiology








 Date/Time


Source Procedure


Growth Status


 


 


 5/26/18 15:08


Blood Peripheral Aerobic Blood Culture - Preliminary


Streptococcus Species Resulted


 


 5/26/18 15:08


Blood Peripheral Anaerobic Blood Culture - Preliminary


NO GROWTH IN 2 DAYS Resulted





 5/26/18 15:00


Blood Peripheral Aerobic Blood Culture - Preliminary


NO GROWTH IN 2 DAYS Resulted


 


 5/26/18 15:00


Blood Peripheral Anaerobic Blood Culture - Preliminary


NO GROWTH IN 2 DAYS Resulted


 


 5/26/18 14:30


Urine Clean Catch Urine Culture - Final


NO GROWTH IN 48 HOURS. Complete








Imaging





Last Impressions








Lower Extremity Angiography 5/23/18 0000 Signed





Impressions: 





 CONCLUSION: 





 1.  Segmental inflow stenosis in the left common iliac and proximal external il





 iac treated with 8 mm stent and 7 mm balloon angioplasty.





 2.  Short segment occlusion with multiple segments of high-grade stenosis at th





 e adductor hiatus bridging the distal SFA and proximal above-knee popliteal. Th





 is area was successfully treated with LS atherectomy and 5 mm balloon angioplas





 ty as detailed above.





  





 


 


Aorta w/Runoff CTA 5/20/18 0000 Signed





Impressions: 





 Service Date/Time:  Ron, May 20, 2018 17:02 - CONCLUSION:  1. Bladder is 





 severely distended which may reflect bladder outlet obstruction or neurogenic 





 bladder. Patient may benefit from Chavez decompression. 2. No significant 

aortic 





 stenosis. 3. Occluded right common iliac origin with reconstitution of the 





 common femoral artery. 4. Moderate long segment stenosis of the left common 





 iliac artery and proximal left external iliac artery. 5. Heavily calcified 





 common femoral bifurcations bilaterally with diffusely diseased profunda. 6. 





 Diffusely diseased bilateral SFA with multiple severe stenoses of the right 

SFA 





 in the mid to distal thigh and short segment occlusion of the left SFA at the 





 adductor canal. 7. Limited two-vessel runoff bilaterally, as above.     

Yvan Sanon MD 


 


Foot X-Ray 5/18/18 0000 Signed





Impressions: 





 Service Date/Time:  Friday, May 18, 2018 21:46 - CONCLUSION:  Suspected 

chronic 





 deformity of the hindfoot.     Iván Shields MD 


 


Foot MRI 5/18/18 0000 Signed





Impressions: 





 Service Date/Time:  Saturday, May 19, 2018 00:12 - CONCLUSION:  1. No definite 





 evidence for osteomyelitis. No discrete abscess. 2. Unusual multifocal 





 subchondral marrow edema throughout the left foot and ankle as above. The 





 finding is nonspecific. It can be related to immobilization of the foot or 





 limited use of the foot.     Sumit Arteaga MD 








Physical Exam


CONSTITUTIONAL/GENERAL: This is a thin elderly male  patient, in no apparent 

distress.


TUBES/LINES/DRAINS:


SKIN: No jaundice, rashes, or lesions. Ecchymoses on upper extremities. No 

wounds seen anteriorly. Skin temperature appropriate. Not diaphoretic. 


 CARDIOVASCULAR: Regular rate and rhythm without murmurs, gallops, or rubs. No 

JVD. Peripheral pulses symmetric.


RESPIRATORY/CHEST: Symmetric, unlabored respirations. Clear to auscultation. 

Breath sounds equal bilaterally. No wheezes, rales, or rhonchi.  


GASTROINTESTINAL: Abdomen soft, non-tender, nondistended. No hepato-splenomegaly

, or palpable masses. No guarding. Bowel sounds present.


GENITOURINARY: Without palpable bladder distension.  chavez in place with clear 

yellow uyrine


MUSCULOSKELETAL: Extremities without clubbing, cyanosis, 


Fett are warm


Non tender


Lt MT lesion is dry, edema , erythema resolved; today more tener with some thin 

blister with  purulence 


 NEUROLOGICAL: Awake and alert. Baseline L hemiparesis . Follows commands. 

Confused. Moves all extremities.


PSYCHIATRIC: No obvious anxiety/depression. no apparent hallucinations or other 

psychotic thought process.





Assessment & Plan


Remarks


MSSA infected 1st MT wound , left


? gout 


MSSA sepsis - 2/2 foot infection


    2 D echo w/o vegetations


Underlying moderate to severe PVD


 Bladder outlet obstruction - chavez placed 


   - UA/C+S not cw inf


New strep bacteremia ? significance


More pain, purulence of Lt 1 MT 


abx associated diarrhea





   cont  cefazolin


   fu repeat BC ID on strep


     complete 4 weeks of abx 


reconsult podiatry


chk stool forn C.diff











Charu Stanley MD May 28, 2018 13:03

## 2018-05-28 NOTE — PD.POD
Subjective


Podiatric Problems


61-year-old male status post revascularization with left medial foot ulcer


Pain score:  5





Past Med/Surg/Social History


Past Medical History


Cardiovascular:  REPORTS HX OF: Hypertension


Psychiatric:  REPORTS HX OF: Other psychiatric history (substance abuse)





Social History


Smoking Status:  Current Every Day Smoker





Objective


Vital Signs





Vital Signs








  Date Time  Temp Pulse Resp B/P (MAP) Pulse Ox O2 Delivery O2 Flow Rate FiO2


 


5/28/18 12:04      Room Air  


 


5/28/18 09:00 98.1 76 15 119/80 (93) 99   


 


5/28/18 04:53 98.8 78 18 103/57 (72) 98   


 


5/28/18 00:56 99.4 81 18 98/58 (71) 97   


 


5/27/18 21:15      Room Air  


 


5/27/18 20:30 99.6 89 18 120/60 (80) 97   


 


5/27/18 17:07    137/72 (93)    


 


5/27/18 15:48 98.4 95 18 81/52 (62) 95   








Coded Allergies:  


     No Known Allergies (Verified  Allergy, Unknown, 3/5/18)





Assessment & Plan


A/P


Left foot wound, increased pain


   Ordered repeat MRI Left foot with/without contrast to further evaluate/

compare


   Will discuss further treatment with patient based on results











Kelvin Chandra DPM May 28, 2018 14:28

## 2018-05-29 VITALS
DIASTOLIC BLOOD PRESSURE: 83 MMHG | SYSTOLIC BLOOD PRESSURE: 139 MMHG | TEMPERATURE: 98.2 F | HEART RATE: 74 BPM | RESPIRATION RATE: 20 BRPM | OXYGEN SATURATION: 100 %

## 2018-05-29 VITALS
SYSTOLIC BLOOD PRESSURE: 141 MMHG | TEMPERATURE: 98.4 F | RESPIRATION RATE: 20 BRPM | DIASTOLIC BLOOD PRESSURE: 67 MMHG | HEART RATE: 90 BPM | OXYGEN SATURATION: 100 %

## 2018-05-29 VITALS
SYSTOLIC BLOOD PRESSURE: 132 MMHG | TEMPERATURE: 98.1 F | DIASTOLIC BLOOD PRESSURE: 70 MMHG | OXYGEN SATURATION: 99 % | HEART RATE: 83 BPM | RESPIRATION RATE: 19 BRPM

## 2018-05-29 VITALS
RESPIRATION RATE: 20 BRPM | TEMPERATURE: 98.2 F | SYSTOLIC BLOOD PRESSURE: 141 MMHG | HEART RATE: 85 BPM | DIASTOLIC BLOOD PRESSURE: 68 MMHG | OXYGEN SATURATION: 98 %

## 2018-05-29 VITALS
SYSTOLIC BLOOD PRESSURE: 133 MMHG | HEART RATE: 73 BPM | TEMPERATURE: 98 F | DIASTOLIC BLOOD PRESSURE: 68 MMHG | OXYGEN SATURATION: 99 % | RESPIRATION RATE: 19 BRPM

## 2018-05-29 LAB
ALBUMIN SERPL-MCNC: 3 GM/DL (ref 3.4–5)
ALP SERPL-CCNC: 84 U/L (ref 45–117)
ALT SERPL-CCNC: 14 U/L (ref 12–78)
AST SERPL-CCNC: 27 U/L (ref 15–37)
BASOPHILS # BLD AUTO: 0.1 TH/MM3 (ref 0–0.2)
BASOPHILS NFR BLD: 0.7 % (ref 0–2)
BILIRUB SERPL-MCNC: 0.3 MG/DL (ref 0.2–1)
BUN SERPL-MCNC: 20 MG/DL (ref 7–18)
CALCIUM SERPL-MCNC: 8.7 MG/DL (ref 8.5–10.1)
CHLORIDE SERPL-SCNC: 107 MEQ/L (ref 98–107)
CREAT SERPL-MCNC: 0.97 MG/DL (ref 0.6–1.3)
EOSINOPHIL # BLD: 0.2 TH/MM3 (ref 0–0.4)
EOSINOPHIL NFR BLD: 1.9 % (ref 0–4)
ERYTHROCYTE [DISTWIDTH] IN BLOOD BY AUTOMATED COUNT: 16.1 % (ref 11.6–17.2)
GFR SERPLBLD BASED ON 1.73 SQ M-ARVRAT: 95 ML/MIN (ref 89–?)
GLUCOSE SERPL-MCNC: 82 MG/DL (ref 74–106)
HCO3 BLD-SCNC: 25.7 MEQ/L (ref 21–32)
HCT VFR BLD CALC: 32.2 % (ref 39–51)
HGB BLD-MCNC: 10.6 GM/DL (ref 13–17)
LYMPHOCYTES # BLD AUTO: 2.4 TH/MM3 (ref 1–4.8)
LYMPHOCYTES NFR BLD AUTO: 25.5 % (ref 9–44)
MCH RBC QN AUTO: 24.3 PG (ref 27–34)
MCHC RBC AUTO-ENTMCNC: 32.9 % (ref 32–36)
MCV RBC AUTO: 74 FL (ref 80–100)
MONOCYTE #: 0.9 TH/MM3 (ref 0–0.9)
MONOCYTES NFR BLD: 9.8 % (ref 0–8)
NEUTROPHILS # BLD AUTO: 5.8 TH/MM3 (ref 1.8–7.7)
NEUTROPHILS NFR BLD AUTO: 62.1 % (ref 16–70)
PLATELET # BLD: 324 TH/MM3 (ref 150–450)
PMV BLD AUTO: 7.6 FL (ref 7–11)
PROT SERPL-MCNC: 7.3 GM/DL (ref 6.4–8.2)
RBC # BLD AUTO: 4.35 MIL/MM3 (ref 4.5–5.9)
SODIUM SERPL-SCNC: 142 MEQ/L (ref 136–145)
WBC # BLD AUTO: 9.4 TH/MM3 (ref 4–11)

## 2018-05-29 PROCEDURE — 3E10X8Z IRRIGATION OF SKIN AND MUCOUS MEMBRANES USING IRRIGATING SUBSTANCE: ICD-10-PCS

## 2018-05-29 PROCEDURE — 0QBP0ZX EXCISION OF LEFT METATARSAL, OPEN APPROACH, DIAGNOSTIC: ICD-10-PCS

## 2018-05-29 RX ADMIN — TAMSULOSIN HYDROCHLORIDE SCH MG: 0.4 CAPSULE ORAL at 08:45

## 2018-05-29 RX ADMIN — CEFAZOLIN SODIUM SCH MLS/HR: 2 SOLUTION INTRAVENOUS at 21:07

## 2018-05-29 RX ADMIN — CLOPIDOGREL BISULFATE SCH MG: 75 TABLET, FILM COATED ORAL at 08:45

## 2018-05-29 RX ADMIN — Medication SCH ML: at 20:00

## 2018-05-29 RX ADMIN — Medication SCH ML: at 08:45

## 2018-05-29 RX ADMIN — CEFAZOLIN SODIUM SCH MLS/HR: 2 SOLUTION INTRAVENOUS at 12:58

## 2018-05-29 RX ADMIN — CEFAZOLIN SODIUM SCH MLS/HR: 2 SOLUTION INTRAVENOUS at 05:36

## 2018-05-29 RX ADMIN — HYDROCODONE BITARTRATE AND ACETAMINOPHEN PRN TAB: 5; 325 TABLET ORAL at 23:59

## 2018-05-29 NOTE — RADRPT
EXAM DATE:  5/29/2018 2:40 PM EDT

AGE/SEX:        62 years / Male



INDICATIONS:    Osteomyelitis.  Metatarsal phalangeal joint of the great toe wound.



CLINICAL DATA:  This is the patient's subsequent encounter. Patient reports that signs and symptoms h
ave been present for 1 week and indicates a pain score of 4/10. 

                                                                          

MEDICAL/SURGICAL HISTORY:       Hypertension. Coronary artery stent.



COMPARISON:      No prior exams available for comparison.



TECHNIQUE:     Multiplanar, multisequence MRI examination was performed without contrast and after th
e intravenous administration of 11 ml Omniscan (gadodiamide)  single exam dose.







FINDINGS:  

Bones: There is an unusual pattern of edema throughout the foot. The patient has a hallux valgus defo
rmity with some surrounding bony edema and significant bony edema in both of the underlying sesamoid 
bones.



Significant edema involving the first metatarsal base almost suggesting a stress fracture.

Joint Spaces: There is marked enhancement around the first MTP joint.

Tendons: The flexor tendons are intact.

Soft Tissues: Unremarkable.

Other: The plantar fascia is intact.  No signal abnormalities are seen in the plantar musculature.

Post Contrast: Diffuse enhancement throughout the midfoot including around the first MTP joint. No ob
vious fistulous tract is identified



CONCLUSION: 

1.  Significant bony edema in and around the first MTP joint including both of the sesamoid bones. I 
suspect is related to hallux valgus deformity.



2.  Linear edema and enhancement along the first metatarsal base and a pattern suggesting a stress fr
acture



3.  No evidence of osteomyelitis





Electronically signed by: Payam Zendejas MD  5/29/2018 3:32 PM EDT

## 2018-05-29 NOTE — HHI.PR
__________________________________________________





Immediate Post Op Note


Procedure Date:


May 29, 2018


Pre Op Diagnosis:  


Left foot ulcer, possible osteomyelitis left 1st metatarsal head


Post Op Diagnosis:  


same


Surgeon:


Kelvin Chandra DPM


Assistant(s):


Staff


Procedure:


Bone biopsy left foot


Findings:


Consistent with diagnosis. Left wound down to bone noted to medial 1st 

metatarsal head area with mild serous drainage. 


Incision made dorsolinear aspect of 1st MTP joint, excising wound and bone 

resected from medial eminence of 1st metatarsal head and sent to pathology as 

bone biopsy. Culture taken, followed by irrigation with normal saline and 

closure with 3-0 vicryl and 2-0 nylon. 


Dressing with xeroform, 4x4, cast padding, ace left foot


Weightbearing as tolerated left foot in surgical shoe


Additional Information:


No tourniquet utilized. Bled very well. Healthy appearance to tissues prior to 

irrigation and closure.


Complications:


None


Specimen(s) removed:


1. bone biopsy left 1st metatarsal head


2. culture left foot


Estimated blood loss:


10mL


Anesthesia:  General, Local (10mL 0.75% marcaine plain)


Drains:  None


IVF


Tourniquet time (min at mmHg)


n/a


Patient to:  PACU


Patient Condition:  Good


Date/Time of Procedure:  SEE SURGICAL CARE RECORD











Kelvin Chandra DPM May 29, 2018 21:38

## 2018-05-29 NOTE — PD.POD
Subjective


Podiatric Problems


61-year-old male status post revascularization with left medial foot ulcer


Pain score:  5





Past Med/Surg/Social History


Past Medical History


Cardiovascular:  REPORTS HX OF: Hypertension


Psychiatric:  REPORTS HX OF: Other psychiatric history (substance abuse)





Social History


Smoking Status:  Current Every Day Smoker





Objective


Vital Signs





Vital Signs








  Date Time  Temp Pulse Resp B/P (MAP) Pulse Ox O2 Delivery O2 Flow Rate FiO2


 


5/29/18 16:00 98.1 83 19 132/70 (90) 99   


 


5/29/18 12:00 98.2 85 20 141/68 (92) 98   


 


5/29/18 10:30      Room Air  


 


5/29/18 08:00 98.0 73 19 133/68 (89) 99   


 


5/29/18 04:00 98.2 74 20 139/83 (101) 100   


 


5/29/18 00:00 98.4 90 20 141/67 (91) 100   


 


5/28/18 22:09      Room Air  








Coded Allergies:  


     No Known Allergies (Verified  Allergy, Unknown, 3/5/18)





Assessment & Plan


A/P


Left foot wound, increased pain


   Reviewed repeat MRI Left foot and discussed with patient that with wound and 

drainage, I can excise wound and take bone for biopsy under wound area to 

confirm


   He agreed to move forward with bone biopsy left foot and possible joint 

resection 1st MTP joint.


   Kelvin Ryan DPM May 29, 2018 21:31

## 2018-05-29 NOTE — RADRPT
EXAM DATE:  5/29/2018 10:05 PM EDT

AGE/SEX:        62 years / Male



INDICATIONS:  I&D of the left foot.



CLINICAL DATA:  This is the patient's subsequent encounter. Patient reports that signs and symptoms h
ave been present for 1 week and indicates a pain score of 5/10. 

                                                                          

MEDICAL/SURGICAL HISTORY:       Hypertension.  Stroke.  Gastroesophageal reflux disease.  Myocardial 
infarction.   Coronary artery stent.



[place]      [facility] [place][id number][place]/18/2018.  .



FINDINGS:  

Patient is status post a bunionectomy. Postsurgical changes are noted at the first metatarsal-phalang
eal joint. Otherwise, the rest the bony structures are intact. No acute fracture or joint dislocation
 is seen. No foreign bodies are demonstrated.



CONCLUSION: 

Postsurgical changes characteristic of a bunionectomy.









Electronically signed by: Tom Arevalo MD  5/29/2018 10:36 PM EDT

## 2018-05-29 NOTE — HHI.PR
Subjective


Remarks


Repeat MRI and Vascular US ordered for today.  Patient has a decrease in 

hematuria today, compared to prior day(s).  No new complaints from the patient.





Objective





Vital Signs








  Date Time  Temp Pulse Resp B/P (MAP) Pulse Ox O2 Delivery O2 Flow Rate FiO2


 


5/29/18 04:00 98.2 74 20 139/83 (101) 100   


 


5/29/18 00:00 98.4 90 20 141/67 (91) 100   


 


5/28/18 20:00 98.5 97 20 141/77 (98) 99   


 


5/28/18 19:07      Room Air  


 


5/28/18 16:00 98.2 94 18 135/95 (108) 100   


 


5/28/18 12:04      Room Air  


 


5/28/18 12:00 97.7 89 18 129/73 (91) 99   














I/O      


 


 5/28/18 5/28/18 5/28/18 5/29/18 5/29/18 5/29/18





 07:00 15:00 23:00 07:00 15:00 23:00


 


Intake Total    240 ml  


 


Output Total 300 ml   250 ml  


 


Balance -300 ml   -10 ml  


 


      


 


Intake Oral    240 ml  


 


Output Urine Total 300 ml   250 ml  








Result Diagram:  


5/29/18 0440                                                                   

             5/29/18 0640





Objective Remarks


GENERAL: NAD, A&Ox3


HEAD: Normocephalic. 


NECK: Supple, trachea midline. No lymphadenopathy.


EYES: No scleral icterus. No injection or drainage. 


CARDIOVASCULAR: Regular rate and rhythm without murmurs, gallops, or rubs. 


RESPIRATORY: Breath sounds equal bilaterally. No accessory muscle use.


GASTROINTESTINAL: Abdomen soft, non-tender, nondistended. 


MUSCULOSKELETAL: No cyanosis, or edema. 


SKIN: Warm and dry.


NEURO:  No focal neurological deficitis.





A/P


Problem List:  


(1) Cellulitis of left foot


ICD Code:  L03.116 - Cellulitis of left lower limb


Status:  Acute


(2) Hypotension


ICD Code:  I95.9 - Hypotension


Status:  Acute


(3) Hematuria, gross


ICD Code:  R31.0 - Hematuria, gross


Status:  Acute


Assessment and Plan


62-year-old male admitted for left foot pain and swelling. 





Hematuria improving.  Continue to monitor CBC.  Repeat imaging of foot in 

process.  Final decisions about management of the foot infection pending.





Sepsis


Resolved 





Left Foot Cellulitis


septic joint vs osteomyelitis


Continue cefazolin for 4 weeks


Podiatry following


ID following





Peripheral vascular disease


History of femorofemoral bypass


PVD contributory to limb/foot disease


Continue Plavix





Hypotension


Resolved





Urinary obstruction 


hematuria


Follow for improvement


Monitor renal function





DVT prophylaxis


SCDs


No chemoprophylaxis due to active bleeding











Moiz Brizuela MD May 29, 2018 10:19

## 2018-05-30 VITALS
RESPIRATION RATE: 18 BRPM | DIASTOLIC BLOOD PRESSURE: 72 MMHG | HEART RATE: 68 BPM | OXYGEN SATURATION: 98 % | SYSTOLIC BLOOD PRESSURE: 111 MMHG | TEMPERATURE: 98.6 F

## 2018-05-30 VITALS
HEART RATE: 100 BPM | SYSTOLIC BLOOD PRESSURE: 97 MMHG | OXYGEN SATURATION: 97 % | RESPIRATION RATE: 17 BRPM | TEMPERATURE: 99.3 F

## 2018-05-30 VITALS
RESPIRATION RATE: 16 BRPM | HEART RATE: 76 BPM | TEMPERATURE: 98.8 F | DIASTOLIC BLOOD PRESSURE: 55 MMHG | SYSTOLIC BLOOD PRESSURE: 83 MMHG | OXYGEN SATURATION: 98 %

## 2018-05-30 VITALS
HEART RATE: 75 BPM | TEMPERATURE: 97.7 F | RESPIRATION RATE: 14 BRPM | OXYGEN SATURATION: 98 % | DIASTOLIC BLOOD PRESSURE: 68 MMHG | SYSTOLIC BLOOD PRESSURE: 141 MMHG

## 2018-05-30 VITALS
TEMPERATURE: 99.4 F | DIASTOLIC BLOOD PRESSURE: 60 MMHG | HEART RATE: 92 BPM | RESPIRATION RATE: 18 BRPM | SYSTOLIC BLOOD PRESSURE: 135 MMHG | OXYGEN SATURATION: 96 %

## 2018-05-30 VITALS — OXYGEN SATURATION: 98 %

## 2018-05-30 VITALS
SYSTOLIC BLOOD PRESSURE: 118 MMHG | RESPIRATION RATE: 16 BRPM | HEART RATE: 74 BPM | OXYGEN SATURATION: 98 % | TEMPERATURE: 98.3 F | DIASTOLIC BLOOD PRESSURE: 66 MMHG

## 2018-05-30 RX ADMIN — Medication SCH ML: at 21:13

## 2018-05-30 RX ADMIN — CEFAZOLIN SODIUM SCH MLS/HR: 2 SOLUTION INTRAVENOUS at 13:45

## 2018-05-30 RX ADMIN — TAMSULOSIN HYDROCHLORIDE SCH MG: 0.4 CAPSULE ORAL at 08:41

## 2018-05-30 RX ADMIN — HYDROCODONE BITARTRATE AND ACETAMINOPHEN PRN TAB: 5; 325 TABLET ORAL at 05:34

## 2018-05-30 RX ADMIN — HYDROCODONE BITARTRATE AND ACETAMINOPHEN PRN TAB: 5; 325 TABLET ORAL at 13:45

## 2018-05-30 RX ADMIN — CEFAZOLIN SODIUM SCH MLS/HR: 2 SOLUTION INTRAVENOUS at 05:32

## 2018-05-30 RX ADMIN — CLOPIDOGREL BISULFATE SCH MG: 75 TABLET, FILM COATED ORAL at 08:41

## 2018-05-30 RX ADMIN — Medication SCH ML: at 08:42

## 2018-05-30 RX ADMIN — HYDROCODONE BITARTRATE AND ACETAMINOPHEN PRN TAB: 5; 325 TABLET ORAL at 21:12

## 2018-05-30 RX ADMIN — CEFAZOLIN SODIUM SCH MLS/HR: 2 SOLUTION INTRAVENOUS at 21:13

## 2018-05-30 NOTE — HHI.IDPN
Subjective


Subjective


Remarks


MRI w/o osteo


s/ p I+D yday, clx P


c.diff negative


BC with Strep spp in 1/4  bottles, had fever 2 days ago


Antibiotics


cefazoline


Allergies:  


Coded Allergies:  


     No Known Allergies (Verified  Allergy, Unknown, 3/5/18)





Objective


.





Vital Signs








  Date Time  Temp Pulse Resp B/P (MAP) Pulse Ox O2 Delivery O2 Flow Rate FiO2


 


5/30/18 11:53 98.6 68 18 111/72 (85) 98   


 


5/30/18 09:44     98   21


 


5/30/18 08:00 98.3 74 16 118/66 (83) 98   


 


5/30/18 04:00 99.4 92 18 135/60 (85) 96   


 


5/30/18 00:00 97.7 75 14 141/68 (92) 98   


 


5/29/18 22:10 97.8 85 16 113/79 (90) 100 Room Air  


 


5/29/18 22:00  84 17 113/79 (90) 100 Room Air  


 


5/29/18 21:45 97.4 96 16 102/63 (76) 100 Room Air  


 


5/29/18 21:45  92 16 103/71 (82) 99 Room Air  


 


5/29/18 16:00 98.1 83 19 132/70 (90) 99   








.





Laboratory Tests








Test


  5/29/18


04:40


 


White Blood Count 9.4 TH/MM3 


 


Red Blood Count 4.35 MIL/MM3 


 


Hemoglobin 10.6 GM/DL 


 


Hematocrit 32.2 % 


 


Mean Corpuscular Volume 74.0 FL 


 


Mean Corpuscular Hemoglobin 24.3 PG 


 


Mean Corpuscular Hemoglobin


Concent 32.9 % 


 


 


Red Cell Distribution Width 16.1 % 


 


Platelet Count 324 TH/MM3 


 


Mean Platelet Volume 7.6 FL 


 


Neutrophils (%) (Auto) 62.1 % 


 


Lymphocytes (%) (Auto) 25.5 % 


 


Monocytes (%) (Auto) 9.8 % 


 


Eosinophils (%) (Auto) 1.9 % 


 


Basophils (%) (Auto) 0.7 % 


 


Neutrophils # (Auto) 5.8 TH/MM3 


 


Lymphocytes # (Auto) 2.4 TH/MM3 


 


Monocytes # (Auto) 0.9 TH/MM3 


 


Eosinophils # (Auto) 0.2 TH/MM3 


 


Basophils # (Auto) 0.1 TH/MM3 


 


CBC Comment DIFF FINAL 


 


Differential Comment  








Laboratory Tests








Test


  5/29/18


06:40


 


Blood Urea Nitrogen 20 MG/DL 


 


Creatinine 0.97 MG/DL 


 


Random Glucose 82 MG/DL 


 


Total Protein 7.3 GM/DL 


 


Albumin 3.0 GM/DL 


 


Calcium Level 8.7 MG/DL 


 


Alkaline Phosphatase 84 U/L 


 


Aspartate Amino Transf


(AST/SGOT) 27 U/L 


 


 


Alanine Aminotransferase


(ALT/SGPT) 14 U/L 


 


 


Total Bilirubin 0.3 MG/DL 


 


Sodium Level 142 MEQ/L 


 


Potassium Level 4.0 MEQ/L 


 


Chloride Level 107 MEQ/L 


 


Carbon Dioxide Level 25.7 MEQ/L 


 


Anion Gap 9 MEQ/L 


 


Estimat Glomerular Filtration


Rate 95 ML/MIN 


 








Microbiology








 Date/Time


Source Procedure


Growth Status


 


 


 5/29/18 22:12


Wound Foot Gram Stain - Final Resulted


 


 5/29/18 22:12


Wound Foot Wound Culture


Pending Resulted





 5/29/18 22:12


Wound Foot Fungal Smear


Pending Received


 


 5/29/18 22:12


Wound Foot Fungal Culture


Pending Received





 5/29/18 22:12


Wound Foot Acid Fast Stain


Pending Received


 


 5/29/18 22:12


Wound Foot Mycobacterial Culture


Pending Received








Imaging





Last Impressions








Foot X-Ray 5/29/18 0000 Signed





Impressions: 





 CONCLUSION: 





 Postsurgical changes characteristic of a bunionectomy.





  





 





  





 





  





 





  





 


 


Foot MRI 5/29/18 0000 Signed





Impressions: 





 CONCLUSION: 





 1.  Significant bony edema in and around the first MTP joint including both of 





 the sesamoid bones. I suspect is related to hallux valgus deformity.





  





 





 2.  Linear edema and enhancement along the first metatarsal base and a pattern 





 suggesting a stress fracture





  





 





 3.  No evidence of osteomyelitis





  





 





  





 


 


Lower Extremity Angiography 5/23/18 0000 Signed





Impressions: 





 CONCLUSION: 





 1.  Segmental inflow stenosis in the left common iliac and proximal external il





 iac treated with 8 mm stent and 7 mm balloon angioplasty.





 2.  Short segment occlusion with multiple segments of high-grade stenosis at th





 e adductor hiatus bridging the distal SFA and proximal above-knee popliteal. Th





 is area was successfully treated with LS atherectomy and 5 mm balloon angioplas





 ty as detailed above.





  





 


 


Aorta w/Runoff CTA 5/20/18 0000 Signed





Impressions: 





 Service Date/Time:  Ron, May 20, 2018 17:02 - CONCLUSION:  1. Bladder is 





 severely distended which may reflect bladder outlet obstruction or neurogenic 





 bladder. Patient may benefit from Chavez decompression. 2. No significant 

aortic 





 stenosis. 3. Occluded right common iliac origin with reconstitution of the 





 common femoral artery. 4. Moderate long segment stenosis of the left common 





 iliac artery and proximal left external iliac artery. 5. Heavily calcified 





 common femoral bifurcations bilaterally with diffusely diseased profunda. 6. 





 Diffusely diseased bilateral SFA with multiple severe stenoses of the right 

SFA 





 in the mid to distal thigh and short segment occlusion of the left SFA at the 





 adductor canal. 7. Limited two-vessel runoff bilaterally, as above.     

Yvan Sanon MD 








Physical Exam


CONSTITUTIONAL/GENERAL: This is a thin elderly male  patient, in no apparent 

distress.


TUBES/LINES/DRAINS:


SKIN: No jaundice, rashes, or lesions. Ecchymoses on upper extremities. No 

wounds seen anteriorly. Skin temperature appropriate. Not diaphoretic. 


 CARDIOVASCULAR: Regular rate and rhythm without murmurs, gallops, or rubs. No 

JVD. Peripheral pulses symmetric.


RESPIRATORY/CHEST: Symmetric, unlabored respirations. Clear to auscultation. 

Breath sounds equal bilaterally. No wheezes, rales, or rhonchi.  


GASTROINTESTINAL: Abdomen soft, non-tender, nondistended. No hepato-splenomegaly

, or palpable masses. No guarding. Bowel sounds present.


GENITOURINARY: Without palpable bladder distension.  chavez in place with clear 

yellow uyrine


MUSCULOSKELETAL: Extremities without clubbing, cyanosis, 


Fett are warm


Non tender


Lt foot with dressing in place


 NEUROLOGICAL: Awake and alert. Baseline L hemiparesis . Follows commands. 

Confused. Moves all extremities.


PSYCHIATRIC: No obvious anxiety/depression. no apparent hallucinations or other 

psychotic thought process.





Assessment & Plan


Remarks


MSSA infected 1st MT wound , left


? gout 


MSSA sepsis - 2/2 foot infection


    2 D echo w/o vegetations


Underlying moderate to severe PVD


 Bladder outlet obstruction - chavez placed 


   - UA/C+S not cw inf


New viridans strep bacteremia ? significance


    - vir strep is common skin contaminant and in low grade bacteremia likely 

contaminastion


More pain, purulence of Lt 1 MT 


    sp I+D


    clx P


abx associated diarrhea, c.diff negative 





   cont  cefazolin


   fu wound clx 


     complete 4 weeks of abx 


 final rec's per  foot clx, bone bx results











Charu Stanley MD May 30, 2018 12:42

## 2018-05-30 NOTE — PD.POD
Subjective


Podiatric Problems


Status Pod left foot bone bx 05/29/18 with . Pt states he is still 

having pain in the foot but denies any n/v/f/h/c/sob.


Pain score:  4





Past Med/Surg/Social History


Past Medical History


Cardiovascular:  REPORTS HX OF: Hypertension


Psychiatric:  REPORTS HX OF: Other psychiatric history (substance abuse)





Social History


Smoking Status:  Current Every Day Smoker





Objective


Vital Signs





Vital Signs








  Date Time  Temp Pulse Resp B/P (MAP) Pulse Ox O2 Delivery O2 Flow Rate FiO2


 


5/30/18 09:44     98   21


 


5/30/18 08:00 98.3 74 16 118/66 (83) 98   


 


5/30/18 04:00 99.4 92 18 135/60 (85) 96   


 


5/30/18 00:00 97.7 75 14 141/68 (92) 98   


 


5/29/18 22:10 97.8 85 16 113/79 (90) 100 Room Air  


 


5/29/18 22:00  84 17 113/79 (90) 100 Room Air  


 


5/29/18 21:45 97.4 96 16 102/63 (76) 100 Room Air  


 


5/29/18 21:45  92 16 103/71 (82) 99 Room Air  


 


5/29/18 16:00 98.1 83 19 132/70 (90) 99   


 


5/29/18 12:00 98.2 85 20 141/68 (92) 98   








Coded Allergies:  


     No Known Allergies (Verified  Allergy, Unknown, 3/5/18)





Physical Exam


Remarks


Exam limited by dressings. Bandages clean, dry, and intact. No strikethrough. 

AROM to digits WNL. CFT < secs. Calf is supple and non tender to compression.





Assessment & Plan


A/P


1)s/p left foot bone bx


-final bone cxs pending


-Heel WBing only to left foot


-likely dressing change tomorrow with 


-will determine d/c planning pending bx results


-cont iv abx











Drea Pollard DPM May 30, 2018 10:39

## 2018-05-31 VITALS
OXYGEN SATURATION: 96 % | DIASTOLIC BLOOD PRESSURE: 53 MMHG | RESPIRATION RATE: 18 BRPM | HEART RATE: 94 BPM | TEMPERATURE: 98.8 F | SYSTOLIC BLOOD PRESSURE: 96 MMHG

## 2018-05-31 VITALS
DIASTOLIC BLOOD PRESSURE: 69 MMHG | OXYGEN SATURATION: 98 % | RESPIRATION RATE: 19 BRPM | TEMPERATURE: 98 F | SYSTOLIC BLOOD PRESSURE: 99 MMHG | HEART RATE: 89 BPM

## 2018-05-31 VITALS
SYSTOLIC BLOOD PRESSURE: 103 MMHG | TEMPERATURE: 98.4 F | RESPIRATION RATE: 18 BRPM | OXYGEN SATURATION: 95 % | HEART RATE: 80 BPM | DIASTOLIC BLOOD PRESSURE: 64 MMHG

## 2018-05-31 VITALS — OXYGEN SATURATION: 96 %

## 2018-05-31 VITALS
DIASTOLIC BLOOD PRESSURE: 78 MMHG | OXYGEN SATURATION: 96 % | TEMPERATURE: 98.9 F | SYSTOLIC BLOOD PRESSURE: 137 MMHG | RESPIRATION RATE: 18 BRPM | HEART RATE: 118 BPM

## 2018-05-31 VITALS
OXYGEN SATURATION: 98 % | HEART RATE: 88 BPM | SYSTOLIC BLOOD PRESSURE: 100 MMHG | RESPIRATION RATE: 17 BRPM | TEMPERATURE: 98 F | DIASTOLIC BLOOD PRESSURE: 61 MMHG

## 2018-05-31 VITALS — OXYGEN SATURATION: 99 %

## 2018-05-31 VITALS
HEART RATE: 95 BPM | RESPIRATION RATE: 18 BRPM | TEMPERATURE: 98.2 F | DIASTOLIC BLOOD PRESSURE: 54 MMHG | SYSTOLIC BLOOD PRESSURE: 93 MMHG | OXYGEN SATURATION: 99 %

## 2018-05-31 RX ADMIN — Medication SCH ML: at 20:14

## 2018-05-31 RX ADMIN — CEFAZOLIN SODIUM SCH MLS/HR: 2 SOLUTION INTRAVENOUS at 13:42

## 2018-05-31 RX ADMIN — CEFAZOLIN SODIUM SCH MLS/HR: 2 SOLUTION INTRAVENOUS at 05:42

## 2018-05-31 RX ADMIN — CLOPIDOGREL BISULFATE SCH MG: 75 TABLET, FILM COATED ORAL at 08:56

## 2018-05-31 RX ADMIN — Medication SCH ML: at 08:57

## 2018-05-31 RX ADMIN — CEFAZOLIN SODIUM SCH MLS/HR: 2 SOLUTION INTRAVENOUS at 20:14

## 2018-05-31 RX ADMIN — TAMSULOSIN HYDROCHLORIDE SCH MG: 0.4 CAPSULE ORAL at 08:56

## 2018-05-31 NOTE — PD.POD
Subjective


Podiatric Problems


Status post bone biopsy left first metatarsal and ulcer excision left medial 

foot. 5/29/18 Dr Chandra


Pain score:  4





Past Med/Surg/Social History


Past Medical History


Cardiovascular:  REPORTS HX OF: Hypertension


Psychiatric:  REPORTS HX OF: Other psychiatric history (substance abuse)





Social History


Smoking Status:  Current Every Day Smoker





Objective


Vital Signs





Vital Signs








  Date Time  Temp Pulse Resp B/P (MAP) Pulse Ox O2 Delivery O2 Flow Rate FiO2


 


5/31/18 20:48 98.8 94 18 96/53 (67) 96   


 


5/31/18 16:00 98.9 118 18 137/78 (97) 96   


 


5/31/18 14:51     99   21


 


5/31/18 12:42 98.2 95 18 93/54 (67) 99   


 


5/31/18 08:34 98.4 80 18 103/64 (77) 95   


 


5/31/18 05:45 98.0 89 19 99/69 (79) 98   


 


5/31/18 00:00 98.0 88 17 100/61 (74) 98   


 


5/30/18 21:30 99.3 100 17 97/ 97   








Coded Allergies:  


     No Known Allergies (Verified  Allergy, Unknown, 3/5/18)





Exam-Podiatry


Remarks


Left medial foot with sutures intact. No drainage. Mild pain. No sign of 

infection.





Assessment & Plan


A/P


Status post bone biopsy left first metatarsal and ulcer excision left medial 

foot. 5/29/18 Dr Chandra


   Await bone biopsy results.


   Changed dressing. Ordered q5days dressing dry sterile per nursing while in-

house.


   Needs to follow up in clinic for dressing change 1 week after discharge.


   Leave bandage clean, dry, intact between hospital discharge and clinic 

follow up 1 week later.


   Weightbearing as tolerated in surgical shoe left foot. OK to remove when at 

rest.











Kelvin Chandra DPM May 31, 2018 21:02

## 2018-06-01 VITALS
HEART RATE: 98 BPM | DIASTOLIC BLOOD PRESSURE: 63 MMHG | OXYGEN SATURATION: 94 % | RESPIRATION RATE: 18 BRPM | TEMPERATURE: 98 F | SYSTOLIC BLOOD PRESSURE: 108 MMHG

## 2018-06-01 VITALS
TEMPERATURE: 97.8 F | OXYGEN SATURATION: 99 % | RESPIRATION RATE: 20 BRPM | HEART RATE: 79 BPM | DIASTOLIC BLOOD PRESSURE: 80 MMHG | SYSTOLIC BLOOD PRESSURE: 97 MMHG

## 2018-06-01 VITALS
RESPIRATION RATE: 20 BRPM | HEART RATE: 108 BPM | TEMPERATURE: 98.6 F | OXYGEN SATURATION: 98 % | SYSTOLIC BLOOD PRESSURE: 111 MMHG | DIASTOLIC BLOOD PRESSURE: 63 MMHG

## 2018-06-01 VITALS
OXYGEN SATURATION: 99 % | TEMPERATURE: 98 F | DIASTOLIC BLOOD PRESSURE: 75 MMHG | SYSTOLIC BLOOD PRESSURE: 125 MMHG | RESPIRATION RATE: 18 BRPM | HEART RATE: 76 BPM

## 2018-06-01 VITALS
OXYGEN SATURATION: 98 % | DIASTOLIC BLOOD PRESSURE: 75 MMHG | RESPIRATION RATE: 18 BRPM | HEART RATE: 88 BPM | SYSTOLIC BLOOD PRESSURE: 121 MMHG | TEMPERATURE: 97.7 F

## 2018-06-01 VITALS
DIASTOLIC BLOOD PRESSURE: 49 MMHG | HEART RATE: 100 BPM | SYSTOLIC BLOOD PRESSURE: 99 MMHG | TEMPERATURE: 97.9 F | RESPIRATION RATE: 20 BRPM | OXYGEN SATURATION: 98 %

## 2018-06-01 RX ADMIN — Medication SCH ML: at 08:24

## 2018-06-01 RX ADMIN — Medication SCH ML: at 21:53

## 2018-06-01 RX ADMIN — CLOPIDOGREL BISULFATE SCH MG: 75 TABLET, FILM COATED ORAL at 08:23

## 2018-06-01 RX ADMIN — CEFAZOLIN SODIUM SCH MLS/HR: 2 SOLUTION INTRAVENOUS at 05:46

## 2018-06-01 RX ADMIN — CEFAZOLIN SODIUM SCH MLS/HR: 2 SOLUTION INTRAVENOUS at 21:53

## 2018-06-01 RX ADMIN — TAMSULOSIN HYDROCHLORIDE SCH MG: 0.4 CAPSULE ORAL at 08:23

## 2018-06-01 RX ADMIN — CEFAZOLIN SODIUM SCH MLS/HR: 2 SOLUTION INTRAVENOUS at 12:46

## 2018-06-01 NOTE — HHI.FF
Face to Face Verification


Diagnosis:  


(1) Cellulitis of left foot


Physical Therapy


Order:  Evaluate and Treat, Improve ambulation, Strength and gait training





Occupational Therapy


Order:  Evaluate and Treat, Gross motor coordination, Fine motor coordination





Home Health Nursing








Order: Wound care and dressing changes








Instructions:


Gauze covering, change daily till closed.  May use ace bandages for padding.











I have seen patient Dominik Brian Jr Marques on 6/1/18. My clinical findings 

support the need for the requested home health care services because:








 Ltd mobility - disease progression





 Deconditioned w/ increased weakness





 Limited ability to care for self





 High risk of falls














I certify that my clinical findings support that this patient is homebound 

because:








 Post-op weakness





 Unsteady gait/balance





 Unsafe to leave home unassisted





 Non-ambulatory-confined bed/chair





 Unable to use public transportation

















Moiz Brizuela MD Jun 1, 2018 13:55

## 2018-06-01 NOTE — HHI.PR
Subjective


Remarks


Working well with PT. Wound cultures and bone biopsy pending.  Pain controlled.





Objective





Vital Signs








  Date Time  Temp Pulse Resp B/P (MAP) Pulse Ox O2 Delivery O2 Flow Rate FiO2


 


6/1/18 10:50        21


 


6/1/18 08:25 97.8 79 20 97/80 (86) 99   


 


6/1/18 04:55 98.0 76 18 125/75 (92) 99   


 


6/1/18 00:18 97.7 88 18 121/75 (90) 98   


 


5/31/18 22:27     96   21


 


5/31/18 20:48 98.8 94 18 96/53 (67) 96   


 


5/31/18 16:00 98.9 118 18 137/78 (97) 96   


 


5/31/18 14:51     99   21


 


5/31/18 12:42 98.2 95 18 93/54 (67) 99   














I/O      


 


 5/31/18 5/31/18 5/31/18 6/1/18 6/1/18 6/1/18





 07:00 15:00 23:00 07:00 15:00 23:00


 


Intake Total 954 ml 600 ml 50 ml   


 


Output Total 850 ml 800 ml  450 ml  


 


Balance 104 ml -200 ml 50 ml -450 ml  


 


      


 


Intake Oral 850 ml 600 ml    


 


IV Total 104 ml  50 ml   


 


Output Urine Total 850 ml 800 ml  450 ml  


 


# Bowel Movements 0     








Result Diagram:  


5/29/18 0440                                                                   

             5/29/18 0640





Objective Remarks


GENERAL: NAD, A&Ox3


HEAD: Normocephalic. 


NECK: Supple, trachea midline. No lymphadenopathy.


EYES: No scleral icterus. No injection or drainage. 


CARDIOVASCULAR: Regular rate and rhythm without murmurs, gallops, or rubs. 


RESPIRATORY: Breath sounds equal bilaterally. No accessory muscle use.


GASTROINTESTINAL: Abdomen soft, non-tender, nondistended. 


MUSCULOSKELETAL: No cyanosis, or edema. 


SKIN: Warm and dry.


NEURO:  No focal neurological deficitis.





A/P


Problem List:  


(1) Cellulitis of left foot


ICD Code:  L03.116 - Cellulitis of left lower limb


Status:  Acute


(2) Hypotension


ICD Code:  I95.9 - Hypotension


Status:  Acute


(3) Hematuria, gross


ICD Code:  R31.0 - Hematuria, gross


Status:  Acute


Assessment and Plan


62-year-old male admitted for left foot pain and swelling. 





Flomax continued.  Plan to do a void trial 1-2 days.  Following for report of 

wound culture and bone biopsy.





Sepsis


Resolved 





Left Foot Cellulitis


septic joint vs osteomyelitis


Continue cefazolin for 4 weeks


Podiatry following


ID following





Peripheral vascular disease


History of femorofemoral bypass


PVD contributory to limb/foot disease


Continue Plavix





Hypotension


Resolved





Urinary obstruction 


hematuria


Follow for improvement


Monitor renal function


Flomax





DVT prophylaxis


SCDs


No chemoprophylaxis due to active bleeding











Moiz Brizuela MD Jun 1, 2018 11:10

## 2018-06-02 VITALS
DIASTOLIC BLOOD PRESSURE: 69 MMHG | HEART RATE: 102 BPM | OXYGEN SATURATION: 100 % | TEMPERATURE: 98.2 F | RESPIRATION RATE: 18 BRPM | SYSTOLIC BLOOD PRESSURE: 133 MMHG

## 2018-06-02 VITALS
TEMPERATURE: 98.1 F | SYSTOLIC BLOOD PRESSURE: 125 MMHG | OXYGEN SATURATION: 100 % | RESPIRATION RATE: 18 BRPM | DIASTOLIC BLOOD PRESSURE: 77 MMHG | HEART RATE: 93 BPM

## 2018-06-02 VITALS
TEMPERATURE: 97.7 F | HEART RATE: 91 BPM | DIASTOLIC BLOOD PRESSURE: 87 MMHG | SYSTOLIC BLOOD PRESSURE: 125 MMHG | OXYGEN SATURATION: 100 % | RESPIRATION RATE: 19 BRPM

## 2018-06-02 VITALS
HEART RATE: 86 BPM | RESPIRATION RATE: 19 BRPM | DIASTOLIC BLOOD PRESSURE: 73 MMHG | OXYGEN SATURATION: 100 % | SYSTOLIC BLOOD PRESSURE: 109 MMHG | TEMPERATURE: 98.4 F

## 2018-06-02 VITALS
TEMPERATURE: 98.6 F | HEART RATE: 90 BPM | OXYGEN SATURATION: 100 % | RESPIRATION RATE: 19 BRPM | DIASTOLIC BLOOD PRESSURE: 63 MMHG | SYSTOLIC BLOOD PRESSURE: 131 MMHG

## 2018-06-02 VITALS
OXYGEN SATURATION: 98 % | TEMPERATURE: 98.5 F | RESPIRATION RATE: 18 BRPM | DIASTOLIC BLOOD PRESSURE: 71 MMHG | HEART RATE: 87 BPM | SYSTOLIC BLOOD PRESSURE: 111 MMHG

## 2018-06-02 RX ADMIN — CLOPIDOGREL BISULFATE SCH MG: 75 TABLET, FILM COATED ORAL at 09:25

## 2018-06-02 RX ADMIN — TAMSULOSIN HYDROCHLORIDE SCH MG: 0.4 CAPSULE ORAL at 09:25

## 2018-06-02 RX ADMIN — Medication SCH ML: at 23:32

## 2018-06-02 RX ADMIN — Medication SCH ML: at 09:26

## 2018-06-02 RX ADMIN — CEFAZOLIN SODIUM SCH MLS/HR: 2 SOLUTION INTRAVENOUS at 23:33

## 2018-06-02 RX ADMIN — CEFAZOLIN SODIUM SCH MLS/HR: 2 SOLUTION INTRAVENOUS at 05:16

## 2018-06-02 RX ADMIN — CEFAZOLIN SODIUM SCH MLS/HR: 2 SOLUTION INTRAVENOUS at 13:00

## 2018-06-02 NOTE — PD.POD
Subjective


Podiatric Problems


Status post bone biopsy left first metatarsal and ulcer excision left medial 

foot. 5/29/18 Dr Chandra


Pain score:  4





Past Med/Surg/Social History


Past Medical History


Cardiovascular:  REPORTS HX OF: Hypertension


Psychiatric:  REPORTS HX OF: Other psychiatric history (substance abuse)





Social History


Smoking Status:  Current Every Day Smoker





Objective


Vital Signs





Vital Signs








  Date Time  Temp Pulse Resp B/P (MAP) Pulse Ox O2 Delivery O2 Flow Rate FiO2


 


6/2/18 20:13 98.5 87 18 111/71 (84) 98   


 


6/2/18 16:00 97.7 91 19 125/87 (100) 100   


 


6/2/18 12:00 98.6 90 19 131/63 (85) 100   


 


6/2/18 08:00 98.4 86 19 109/73 (85) 100   


 


6/2/18 04:48 98.1 93 18 125/77 (93) 100   


 


6/2/18 00:31 98.2 102 18 133/69 (90) 100   








Coded Allergies:  


     No Known Allergies (Verified  Allergy, Unknown, 3/5/18)





Assessment & Plan


A/P


Status post bone biopsy left first metatarsal and ulcer excision left medial 

foot. 5/29/18 Dr Chandra


   Bone biopsy positive for chronic osteomyelitis. Operative cultures negative, 

but pre-procedure culture results likely indicate organism involved.


   Patient needs 6 weeks IV antibiotics per ID recommendations per original 

culture result. 


   Continue q5days dressing dry sterile per nursing while in-house.


   Needs to follow up in clinic for dressing change 1 week after discharge.


   Leave bandage clean, dry, intact between hospital discharge and clinic 

follow up 1 week later.


   Weightbearing as tolerated in surgical shoe left foot. OK to remove when at 

rest.


   Podiatry signing off











Kelvin Chandra DPM Jun 2, 2018 22:09

## 2018-06-02 NOTE — HHI.PR
Subjective


Remarks


Cultures are negative at 72 hours.  Bone biopsy shows osteomyelitis.  Patient 

has no new complaints today.





Objective





Vital Signs








  Date Time  Temp Pulse Resp B/P (MAP) Pulse Ox O2 Delivery O2 Flow Rate FiO2


 


6/2/18 08:00 98.4 86 19 109/73 (85) 100   


 


6/2/18 04:48 98.1 93 18 125/77 (93) 100   


 


6/2/18 00:31 98.2 102 18 133/69 (90) 100   


 


6/1/18 21:02 98.0 98 18 108/63 (78) 94   


 


6/1/18 17:38        21


 


6/1/18 16:00 98.6 108 20 111/63 (79) 98   


 


6/1/18 12:20 97.9 100 20 99/49 (66) 98   


 


6/1/18 10:50        21














I/O      


 


 6/1/18 6/1/18 6/1/18 6/2/18 6/2/18 6/2/18





 07:00 15:00 23:00 07:00 15:00 23:00


 


Intake Total  480 ml 50 ml 290 ml  


 


Output Total 450 ml 750 ml    


 


Balance -450 ml -270 ml 50 ml 290 ml  


 


      


 


Intake Oral  480 ml  240 ml  


 


IV Total   50 ml 50 ml  


 


Output Urine Total 450 ml 750 ml    








Result Diagram:  


5/29/18 0440                                                                   

             5/29/18 0640





Objective Remarks


GENERAL: NAD, A&Ox3


HEAD: Normocephalic. 


NECK: Supple, trachea midline. No lymphadenopathy.


EYES: No scleral icterus. No injection or drainage. 


CARDIOVASCULAR: Regular rate and rhythm without murmurs, gallops, or rubs. 


RESPIRATORY: Breath sounds equal bilaterally. No accessory muscle use.


GASTROINTESTINAL: Abdomen soft, non-tender, nondistended. 


MUSCULOSKELETAL: No cyanosis, or edema. 


SKIN: Warm and dry.


NEURO:  No focal neurological deficitis.





A/P


Problem List:  


(1) Cellulitis of left foot


ICD Code:  L03.116 - Cellulitis of left lower limb


Status:  Acute


(2) Hypotension


ICD Code:  I95.9 - Hypotension


Status:  Acute


(3) Hematuria, gross


ICD Code:  R31.0 - Hematuria, gross


Status:  Acute


Assessment and Plan


62-year-old male admitted for left foot pain and swelling. 





Bone biopsy shows ostium mellitus.  Further management to be determined.  Most 

recent wound cultures negative at 72 hours.  Antibiotic selection in process.





Sepsis


Resolved 





Left Foot Cellulitis


septic joint vs osteomyelitis


Continue cefazolin for 4 weeks


Podiatry following


ID following





Peripheral vascular disease


History of femorofemoral bypass


PVD contributory to limb/foot disease


Continue Plavix





Hypotension


Resolved





Urinary obstruction 


hematuria


Follow for improvement


Monitor renal function


Flomax





DVT prophylaxis


SCDs


No chemoprophylaxis due to active bleeding











Moiz Brizuela MD Jun 2, 2018 09:59

## 2018-06-03 VITALS
DIASTOLIC BLOOD PRESSURE: 59 MMHG | HEART RATE: 101 BPM | RESPIRATION RATE: 19 BRPM | SYSTOLIC BLOOD PRESSURE: 97 MMHG | TEMPERATURE: 97.9 F | OXYGEN SATURATION: 98 %

## 2018-06-03 VITALS
TEMPERATURE: 98.9 F | HEART RATE: 90 BPM | RESPIRATION RATE: 19 BRPM | SYSTOLIC BLOOD PRESSURE: 113 MMHG | DIASTOLIC BLOOD PRESSURE: 58 MMHG | OXYGEN SATURATION: 99 %

## 2018-06-03 VITALS
HEART RATE: 89 BPM | TEMPERATURE: 98.6 F | RESPIRATION RATE: 19 BRPM | SYSTOLIC BLOOD PRESSURE: 121 MMHG | DIASTOLIC BLOOD PRESSURE: 72 MMHG | OXYGEN SATURATION: 99 %

## 2018-06-03 VITALS
SYSTOLIC BLOOD PRESSURE: 124 MMHG | HEART RATE: 81 BPM | OXYGEN SATURATION: 99 % | DIASTOLIC BLOOD PRESSURE: 66 MMHG | RESPIRATION RATE: 18 BRPM | TEMPERATURE: 98.5 F

## 2018-06-03 VITALS
RESPIRATION RATE: 18 BRPM | SYSTOLIC BLOOD PRESSURE: 100 MMHG | TEMPERATURE: 98 F | OXYGEN SATURATION: 100 % | HEART RATE: 88 BPM | DIASTOLIC BLOOD PRESSURE: 64 MMHG

## 2018-06-03 VITALS
TEMPERATURE: 97.8 F | SYSTOLIC BLOOD PRESSURE: 140 MMHG | RESPIRATION RATE: 18 BRPM | DIASTOLIC BLOOD PRESSURE: 81 MMHG | HEART RATE: 75 BPM

## 2018-06-03 LAB
ALBUMIN SERPL-MCNC: 3.8 GM/DL (ref 3.4–5)
ALP SERPL-CCNC: 90 U/L (ref 45–117)
ALT SERPL-CCNC: 14 U/L (ref 12–78)
AST SERPL-CCNC: 28 U/L (ref 15–37)
BASOPHILS # BLD AUTO: 0.1 TH/MM3 (ref 0–0.2)
BASOPHILS NFR BLD: 0.9 % (ref 0–2)
BILIRUB SERPL-MCNC: 0.2 MG/DL (ref 0.2–1)
BUN SERPL-MCNC: 19 MG/DL (ref 7–18)
CALCIUM SERPL-MCNC: 9.8 MG/DL (ref 8.5–10.1)
CHLORIDE SERPL-SCNC: 104 MEQ/L (ref 98–107)
CREAT SERPL-MCNC: 1.12 MG/DL (ref 0.6–1.3)
EOSINOPHIL # BLD: 0.1 TH/MM3 (ref 0–0.4)
EOSINOPHIL NFR BLD: 1.1 % (ref 0–4)
ERYTHROCYTE [DISTWIDTH] IN BLOOD BY AUTOMATED COUNT: 16.5 % (ref 11.6–17.2)
GFR SERPLBLD BASED ON 1.73 SQ M-ARVRAT: 80 ML/MIN (ref 89–?)
GLUCOSE SERPL-MCNC: 78 MG/DL (ref 74–106)
HCO3 BLD-SCNC: 26.5 MEQ/L (ref 21–32)
HCT VFR BLD CALC: 35.7 % (ref 39–51)
HGB BLD-MCNC: 11.5 GM/DL (ref 13–17)
LYMPHOCYTES # BLD AUTO: 3.3 TH/MM3 (ref 1–4.8)
LYMPHOCYTES NFR BLD AUTO: 31.7 % (ref 9–44)
MCH RBC QN AUTO: 23.9 PG (ref 27–34)
MCHC RBC AUTO-ENTMCNC: 32.1 % (ref 32–36)
MCV RBC AUTO: 74.5 FL (ref 80–100)
MONOCYTE #: 0.9 TH/MM3 (ref 0–0.9)
MONOCYTES NFR BLD: 8.4 % (ref 0–8)
NEUTROPHILS # BLD AUTO: 6 TH/MM3 (ref 1.8–7.7)
NEUTROPHILS NFR BLD AUTO: 57.9 % (ref 16–70)
PLATELET # BLD: 489 TH/MM3 (ref 150–450)
PMV BLD AUTO: 7.3 FL (ref 7–11)
PROT SERPL-MCNC: 8.6 GM/DL (ref 6.4–8.2)
RBC # BLD AUTO: 4.79 MIL/MM3 (ref 4.5–5.9)
SODIUM SERPL-SCNC: 139 MEQ/L (ref 136–145)
WBC # BLD AUTO: 10.3 TH/MM3 (ref 4–11)

## 2018-06-03 RX ADMIN — CEFAZOLIN SODIUM SCH MLS/HR: 2 SOLUTION INTRAVENOUS at 13:03

## 2018-06-03 RX ADMIN — CLOPIDOGREL BISULFATE SCH MG: 75 TABLET, FILM COATED ORAL at 09:24

## 2018-06-03 RX ADMIN — TAMSULOSIN HYDROCHLORIDE SCH MG: 0.4 CAPSULE ORAL at 09:24

## 2018-06-03 RX ADMIN — Medication SCH ML: at 20:46

## 2018-06-03 RX ADMIN — Medication SCH ML: at 09:24

## 2018-06-03 RX ADMIN — CEFAZOLIN SODIUM SCH MLS/HR: 2 SOLUTION INTRAVENOUS at 05:43

## 2018-06-03 RX ADMIN — CEFAZOLIN SODIUM SCH MLS/HR: 2 SOLUTION INTRAVENOUS at 20:43

## 2018-06-03 NOTE — HHI.PR
Subjective


Remarks


Outpatient antibiotics for 6 weeks planned.  Finalization in process.  Patient 

has not been able to void after removal of catheter.





Objective





Vital Signs








  Date Time  Temp Pulse Resp B/P (MAP) Pulse Ox O2 Delivery O2 Flow Rate FiO2


 


6/3/18 08:00 98.6 89 19 121/72 (88) 99   


 


6/3/18 05:04 97.8 75 18 140/81 (100)    


 


6/3/18 00:42 98.5 81 18 124/66 (85) 99   


 


6/2/18 20:13 98.5 87 18 111/71 (84) 98   


 


6/2/18 16:00 97.7 91 19 125/87 (100) 100   


 


6/2/18 12:00 98.6 90 19 131/63 (85) 100   














I/O      


 


 6/2/18 6/2/18 6/2/18 6/3/18 6/3/18 6/3/18





 07:00 15:00 23:00 07:00 15:00 23:00


 


Intake Total 290 ml     


 


Output Total  950 ml    


 


Balance 290 ml -950 ml    


 


      


 


Intake Oral 240 ml     


 


IV Total 50 ml     


 


Output Urine Total  950 ml    


 


Bladder Scan Volume Amount    833 ml 584 ml 





    833 ml  





    103 ml  





    103 ml  








Result Diagram:  


6/3/18 0539                                                                    

            6/3/18 0559





Objective Remarks


GENERAL: NAD, A&Ox3


HEAD: Normocephalic. 


NECK: Supple, trachea midline. No lymphadenopathy.


EYES: No scleral icterus. No injection or drainage. 


CARDIOVASCULAR: Regular rate and rhythm without murmurs, gallops, or rubs. 


RESPIRATORY: Breath sounds equal bilaterally. No accessory muscle use.


GASTROINTESTINAL: Abdomen soft, non-tender, nondistended. 


MUSCULOSKELETAL: No cyanosis, or edema. 


SKIN: Warm and dry.


NEURO:  No focal neurological deficitis.





A/P


Problem List:  


(1) Cellulitis of left foot


ICD Code:  L03.116 - Cellulitis of left lower limb


Status:  Acute


(2) Hypotension


ICD Code:  I95.9 - Hypotension


Status:  Acute


(3) Hematuria, gross


ICD Code:  R31.0 - Hematuria, gross


Status:  Acute


Assessment and Plan


62-year-old male admitted for left foot pain and swelling. 





Osteomyelitis on bone biopsy.  6 weeks of antibiotics plan is treatment.  

Outpatient IV antibiotic arrangements in process.  Void trial failed.  Only 

catheter reinserted and patient will need to follow-up with urology as an 

outpatient to manage this.








Sepsis


Resolved 





Left Foot Cellulitis


septic joint vs osteomyelitis


Continue cefazolin for 4 weeks


Podiatry following


ID following





Peripheral vascular disease


History of femorofemoral bypass


PVD contributory to limb/foot disease


Continue Plavix





Hypotension


Resolved





Urinary obstruction 


hematuria


Follow for improvement


Monitor renal function


Flomax





DVT prophylaxis


SCDs


No chemoprophylaxis due to active bleeding











Moiz Brizuela MD Jerome 3, 2018 11:15

## 2018-06-04 VITALS
TEMPERATURE: 98.8 F | DIASTOLIC BLOOD PRESSURE: 68 MMHG | OXYGEN SATURATION: 99 % | RESPIRATION RATE: 18 BRPM | HEART RATE: 68 BPM | SYSTOLIC BLOOD PRESSURE: 105 MMHG

## 2018-06-04 VITALS
OXYGEN SATURATION: 100 % | HEART RATE: 80 BPM | RESPIRATION RATE: 19 BRPM | DIASTOLIC BLOOD PRESSURE: 65 MMHG | TEMPERATURE: 97.6 F | SYSTOLIC BLOOD PRESSURE: 101 MMHG

## 2018-06-04 VITALS
SYSTOLIC BLOOD PRESSURE: 143 MMHG | HEART RATE: 78 BPM | RESPIRATION RATE: 18 BRPM | DIASTOLIC BLOOD PRESSURE: 79 MMHG | TEMPERATURE: 98.1 F | OXYGEN SATURATION: 98 %

## 2018-06-04 VITALS
SYSTOLIC BLOOD PRESSURE: 118 MMHG | TEMPERATURE: 98 F | DIASTOLIC BLOOD PRESSURE: 53 MMHG | OXYGEN SATURATION: 95 % | HEART RATE: 86 BPM | RESPIRATION RATE: 18 BRPM

## 2018-06-04 VITALS — OXYGEN SATURATION: 98 %

## 2018-06-04 VITALS
RESPIRATION RATE: 18 BRPM | DIASTOLIC BLOOD PRESSURE: 56 MMHG | HEART RATE: 69 BPM | SYSTOLIC BLOOD PRESSURE: 103 MMHG | OXYGEN SATURATION: 100 % | TEMPERATURE: 97.9 F

## 2018-06-04 VITALS
TEMPERATURE: 98.5 F | OXYGEN SATURATION: 100 % | HEART RATE: 92 BPM | SYSTOLIC BLOOD PRESSURE: 134 MMHG | DIASTOLIC BLOOD PRESSURE: 67 MMHG | RESPIRATION RATE: 18 BRPM

## 2018-06-04 RX ADMIN — CEFAZOLIN SODIUM SCH MLS/HR: 2 SOLUTION INTRAVENOUS at 04:25

## 2018-06-04 RX ADMIN — CEFAZOLIN SODIUM SCH MLS/HR: 2 SOLUTION INTRAVENOUS at 20:01

## 2018-06-04 RX ADMIN — CEFAZOLIN SODIUM SCH MLS/HR: 2 SOLUTION INTRAVENOUS at 13:19

## 2018-06-04 RX ADMIN — CLOPIDOGREL BISULFATE SCH MG: 75 TABLET, FILM COATED ORAL at 08:38

## 2018-06-04 RX ADMIN — TAMSULOSIN HYDROCHLORIDE SCH MG: 0.4 CAPSULE ORAL at 08:38

## 2018-06-04 RX ADMIN — Medication SCH ML: at 08:38

## 2018-06-04 RX ADMIN — Medication SCH ML: at 20:01

## 2018-06-04 NOTE — HHI.PR
Subjective


Remarks


in no acute distress.


denies pain.


no fever.





Objective


Vitals





Vital Signs








  Date Time  Temp Pulse Resp B/P (MAP) Pulse Ox O2 Delivery O2 Flow Rate FiO2


 


6/4/18 08:00 97.9 69 18 103/56 (72) 100   


 


6/4/18 04:00 98.8 68 18 105/68 (80) 99   


 


6/4/18 00:00 97.6 80 19 101/65 (77) 100   


 


6/3/18 21:00 98.0 88 18 100/64 (76) 100   


 


6/3/18 16:00 98.9 90 19 113/58 (76) 99   


 


6/3/18 12:00 97.9 101 19 97/59 (72) 98   














I/O      


 


 6/3/18 6/3/18 6/3/18 6/4/18 6/4/18 6/4/18





 07:00 15:00 23:00 07:00 15:00 23:00


 


Intake Total   500 ml 800 ml  


 


Output Total   1000 ml 400 ml  


 


Balance   -500 ml 400 ml  


 


      


 


Intake Oral   500 ml 800 ml  


 


Output Urine Total   1000 ml 400 ml  


 


Bladder Scan Volume Amount 833 ml 584 ml    





 833 ml     





 103 ml     





 103 ml     


 


# Bowel Movements   0 0  








Result Diagram:  


6/3/18 0539                                                                    

            6/3/18 0559





Imaging





Last Impressions








Foot X-Ray 5/29/18 0000 Signed





Impressions: 





 CONCLUSION: 





 Postsurgical changes characteristic of a bunionectomy.





  





 





  





 





  





 





  





 


 


Foot MRI 5/29/18 0000 Signed





Impressions: 





 CONCLUSION: 





 1.  Significant bony edema in and around the first MTP joint including both of 





 the sesamoid bones. I suspect is related to hallux valgus deformity.





  





 





 2.  Linear edema and enhancement along the first metatarsal base and a pattern 





 suggesting a stress fracture





  





 





 3.  No evidence of osteomyelitis





  





 





  





 


 


Lower Extremity Angiography 5/23/18 0000 Signed





Impressions: 





 CONCLUSION: 





 1.  Segmental inflow stenosis in the left common iliac and proximal external il





 iac treated with 8 mm stent and 7 mm balloon angioplasty.





 2.  Short segment occlusion with multiple segments of high-grade stenosis at th





 e adductor hiatus bridging the distal SFA and proximal above-knee popliteal. Th





 is area was successfully treated with LS atherectomy and 5 mm balloon angioplas





 ty as detailed above.





  





 


 


Aorta w/Runoff CTA 5/20/18 0000 Signed





Impressions: 





 Service Date/Time:  Ron, May 20, 2018 17:02 - CONCLUSION:  1. Bladder is 





 severely distended which may reflect bladder outlet obstruction or neurogenic 





 bladder. Patient may benefit from Roman decompression. 2. No significant 

aortic 





 stenosis. 3. Occluded right common iliac origin with reconstitution of the 





 common femoral artery. 4. Moderate long segment stenosis of the left common 





 iliac artery and proximal left external iliac artery. 5. Heavily calcified 





 common femoral bifurcations bilaterally with diffusely diseased profunda. 6. 





 Diffusely diseased bilateral SFA with multiple severe stenoses of the right 

SFA 





 in the mid to distal thigh and short segment occlusion of the left SFA at the 





 adductor canal. 7. Limited two-vessel runoff bilaterally, as above.     

Yvan Sanon MD 








Objective Remarks


GENERAL: This is a well-nourished, well-developed patient, in no apparent 

distress.


CARDIOVASCULAR: Regular rate and regular rhythm without murmurs, gallops, or 

rubs. 


RESPIRATORY: Clear to auscultation. Breath sounds equal bilaterally. No wheezes

, rales, or rhonchi.  


GASTROINTESTINAL: Abdomen soft, non-tender, nondistended. 


Normal, active bowel sounds


MUSCULOSKELETAL: left foot is swollen-seems to be improving.


NEURO:  Alert & Oriented x4 to person, place, time, situation.  Moves all ext x4


Procedures


Left common iliac and external iliac balloon angioplasty and stent,  


left SFA Hawk 1 endarterectomy and balloon angioplasty, left external 


iliac and common femoral endarterectomy patch angioplasty, right 


external iliac and common femoral endarterectomy with patch 


angioplasty and femoral-femoral bypass.


Medications and IVs





Inpatient Medications


Acetaminophen (Tylenol) 650 mg Q6H  PRN PO FEVER/PAIN SCALE 1 TO 2 Last 

administered on 5/25/18at 22:49;  Start 5/19/18 at 02:00


Acetaminophen/ Hydrocodone Bitart (Norco  5-325 Mg) 1 tab Q4H  PRN PO PAIN 

SCALE 3 TO 5 Last administered on 5/30/18at 21:12;  Start 5/19/18 at 02:00


Albumin Human 500 ml @  250 mls/hr NOW IV  Last administered on 5/24/18at 01:39

;  Start 5/24/18 at 01:45;  Stop 5/24/18 at 03:44;  Status DC


Bisacodyl (Dulcolax Supp) 10 mg DAILY  PRN RECTAL SEVERE CONSITIPATION;  Start 5 /19/18 at 02:00


Cefazolin Sodium/ Dextrose 50 ml @  100 mls/hr Q8H IV  Last administered on 6/4/ 18at 04:25;  Start 5/23/18 at 21:00


Cefazolin/Sodium Chloride 100 ml @  200 mls/hr Q8H IV  Last administered on 5/23 /18at 04:34;  Start 5/20/18 at 20:00;  Stop 5/23/18 at 21:06;  Status DC


Chlorhexidine Gluconate (Chlorhexidine 2% Cloth) 3 pack ON CALL  PRN TOPICAL 

SEE LABEL COMMENTS;  Start 5/29/18 at 17:45;  Stop 6/1/18 at 17:44;  Status DC


Clindamycin/ Sodium Chloride 50 ml @  100 mls/hr Q8H IV  Last administered on 5/ 19/18at 05:50;  Start 5/19/18 at 06:00;  Stop 5/19/18 at 10:37;  Status DC


Clopidogrel Bisulfate (Plavix) 75 mg DAILY PO  Last administered on 6/4/18at 08:

38;  Start 5/23/18 at 20:00


Lactated Ringer's 1,000 ml @  30 mls/hr Q24H PRN IV SEE LABEL COMMENTS;  Start 5 /29/18 at 17:45;  Stop 6/1/18 at 17:44;  Status DC


Lactulose (Lactulose Liq) 30 ml DAILY  PRN PO SEVERE CONSITIPATION;  Start 5/19/ 18 at 02:00


Magnesium Hydroxide (Milk Of Magnesia Liq) 30 ml Q12H  PRN PO Mild constipation

;  Start 5/19/18 at 02:00


Metoclopramide HCl (Reglan Inj) 5 mg Q6H  PRN IV PUSH NAUSEA OR VOMITING;  

Start 5/19/18 at 02:00


Miscellaneous Information (Misc Nursing Information) ALL NURSING DEPARTME... 

UNSCH  PRN .XX SEE LABEL COMMENTS;  Start 5/29/18 at 22:15;  Stop 5/30/18 at 22:

14;  Status DC


Miscellaneous Information ([facility] Ordered Lab Info) SPECIFIC LAB TO BE 

JUN... ONCE  ONCE .XX ;  Start 5/22/18 at 11:45;  Stop 5/22/18 at 11:45;  

Status DC


Morphine Sulfate (Morphine Inj) 2 mg Q3H  PRN IV PUSH Pain 6-10 Last 

administered on 5/19/18at 12:07;  Start 5/19/18 at 02:15


Pharmacy Profile Note 0 ml @ 0 mls/hr UNSCH OTHER ;  Start 5/19/18 at 10:45;  

Stop 5/20/18 at 19:38;  Status DC


Potassium Chloride (KCl Powder) 40 meq ONCE  ONCE PO  Last administered on 5/27/ 18at 11:45;  Start 5/27/18 at 11:45;  Stop 5/27/18 at 11:46;  Status DC


Povidone Iodine (Betadine 5% Antisepsis Kit) 1 applic ON CALL  PRN EACH NARE 

SEE LABEL COMMENTS;  Start 5/29/18 at 17:45;  Stop 6/1/18 at 17:44;  Status DC


Senna/Docusate Sodium (Yeimi-Colace) 1 tab BID PO ;  Start 5/19/18 at 09:00;  

Status Future Hold


Sennosides (Senokot) 17.2 mg Q12H  PRN PO Moderate constipation;  Start 5/19/18 

at 02:00


Sodium Chloride 500 ml @  30 mls/hr [id number] PRN IV SEE LABEL COMMENTS;  Start 5/ 29/18 at 17:45;  Stop 6/1/18 at 17:44;  Status DC


Sodium Chloride (NS Flush) 2 ml BID IV FLUSH  Last administered on 6/4/18at 08:

38;  Start 5/19/18 at 09:00


Sodium Chloride 1200 ml/Syringe / Bag 1,200 ml @  3,600 mls/hr BOLUS  STAT IV ;

  Start 5/26/18 at 12:19;  Stop 5/26/18 at 12:23;  Status DC


Tamsulosin HCl (Flomax) 0.4 mg DAILY PO  Last administered on 6/4/18at 08:38;  

Start 5/28/18 at 11:00


Vancomycin HCl 1000 mg/Sodium Chloride 250 ml @  250 mls/hr Q24H IV  Last 

administered on 5/20/18at 11:48;  Start 5/19/18 at 12:00;  Stop 5/20/18 at 19:38

;  Status DC





A/P


Problem List:  


(1) Cellulitis of left foot


ICD Code:  L03.116 - Cellulitis of left lower limb


Status:  Acute


(2) Intractable pain


ICD Code:  R52 - Pain, unspecified


Status:  Acute


Assessment and Plan


Sepsis


Resolved 





Left Foot Cellulitis


septic joint vs osteomyelitis


Continue cefazolin for 4 weeks


f/u with podiatry as outpatient.


ID following





Peripheral vascular disease


History of femorofemoral bypass


PVD contributory to limb/foot disease


Status post bilateral open and endovascular leg revascularization femorofemoral 

bypass


Continue Plavix


cleared by vascular surgery for discharge.





Hypotension


Resolved





Urinary obstruction 


hematuria


Follow for improvement


Monitor renal function


Flomax





DVT prophylaxis


SCDs


No chemoprophylaxis due to active bleeding


Discharge Planning


dc planning to SNF when authorization has been received.











Enmanuel Dutta MD Jun 4, 2018 09:10

## 2018-06-05 VITALS
SYSTOLIC BLOOD PRESSURE: 154 MMHG | TEMPERATURE: 98.2 F | HEART RATE: 79 BPM | DIASTOLIC BLOOD PRESSURE: 77 MMHG | RESPIRATION RATE: 18 BRPM | OXYGEN SATURATION: 99 %

## 2018-06-05 VITALS — OXYGEN SATURATION: 98 %

## 2018-06-05 VITALS
DIASTOLIC BLOOD PRESSURE: 82 MMHG | OXYGEN SATURATION: 98 % | HEART RATE: 84 BPM | SYSTOLIC BLOOD PRESSURE: 170 MMHG | TEMPERATURE: 98.1 F | RESPIRATION RATE: 17 BRPM

## 2018-06-05 VITALS
SYSTOLIC BLOOD PRESSURE: 117 MMHG | DIASTOLIC BLOOD PRESSURE: 59 MMHG | HEART RATE: 92 BPM | TEMPERATURE: 98.1 F | OXYGEN SATURATION: 100 % | RESPIRATION RATE: 18 BRPM

## 2018-06-05 VITALS
HEART RATE: 81 BPM | RESPIRATION RATE: 18 BRPM | TEMPERATURE: 98.5 F | SYSTOLIC BLOOD PRESSURE: 152 MMHG | OXYGEN SATURATION: 99 % | DIASTOLIC BLOOD PRESSURE: 85 MMHG

## 2018-06-05 VITALS
TEMPERATURE: 98.7 F | SYSTOLIC BLOOD PRESSURE: 119 MMHG | DIASTOLIC BLOOD PRESSURE: 68 MMHG | OXYGEN SATURATION: 99 % | HEART RATE: 82 BPM | RESPIRATION RATE: 18 BRPM

## 2018-06-05 VITALS
HEART RATE: 74 BPM | RESPIRATION RATE: 18 BRPM | OXYGEN SATURATION: 99 % | SYSTOLIC BLOOD PRESSURE: 151 MMHG | TEMPERATURE: 98 F | DIASTOLIC BLOOD PRESSURE: 74 MMHG

## 2018-06-05 RX ADMIN — HYDROCODONE BITARTRATE AND ACETAMINOPHEN PRN TAB: 5; 325 TABLET ORAL at 20:36

## 2018-06-05 RX ADMIN — TAMSULOSIN HYDROCHLORIDE SCH MG: 0.4 CAPSULE ORAL at 09:23

## 2018-06-05 RX ADMIN — Medication SCH ML: at 20:36

## 2018-06-05 RX ADMIN — Medication SCH ML: at 09:23

## 2018-06-05 RX ADMIN — CEFAZOLIN SODIUM SCH MLS/HR: 2 SOLUTION INTRAVENOUS at 04:48

## 2018-06-05 RX ADMIN — CLOPIDOGREL BISULFATE SCH MG: 75 TABLET, FILM COATED ORAL at 09:23

## 2018-06-05 RX ADMIN — CEFAZOLIN SODIUM SCH MLS/HR: 2 SOLUTION INTRAVENOUS at 13:00

## 2018-06-05 RX ADMIN — CEFAZOLIN SODIUM SCH MLS/HR: 2 SOLUTION INTRAVENOUS at 20:36

## 2018-06-05 NOTE — HHI.PR
Subjective


Remarks


in no acute distress.


no new complaints.


afebrile.





Objective


Vitals





Vital Signs








  Date Time  Temp Pulse Resp B/P (MAP) Pulse Ox O2 Delivery O2 Flow Rate FiO2


 


6/5/18 10:08     98   


 


6/5/18 08:00 98.1 84 17 170/82 (111) 98   


 


6/5/18 04:00 98.7 82 18 119/68 (85) 99   


 


6/5/18 00:00 98.5 81 18 152/85 (107) 99   


 


6/4/18 20:59     98   


 


6/4/18 20:00 98.5 92 18 134/67 (89) 100   


 


6/4/18 16:00 98.1 78 18 143/79 (100) 98   


 


6/4/18 12:00 98.0 86 18 118/53 (74) 95   














I/O      


 


 6/4/18 6/4/18 6/4/18 6/5/18 6/5/18 6/5/18





 07:00 15:00 23:00 07:00 15:00 23:00


 


Intake Total 800 ml 780 ml 50 ml 50 ml  


 


Output Total 400 ml 950 ml  200 ml  


 


Balance 400 ml -170 ml 50 ml -150 ml  


 


      


 


Intake Oral 800 ml 780 ml    


 


IV Total   50 ml 50 ml  


 


Output Urine Total 400 ml 950 ml  200 ml  


 


# Bowel Movements 0 0  1  








Result Diagram:  


6/3/18 0539                                                                    

            6/3/18 0559





Imaging





Last Impressions








Foot X-Ray 5/29/18 0000 Signed





Impressions: 





 CONCLUSION: 





 Postsurgical changes characteristic of a bunionectomy.





  





 





  





 





  





 





  





 


 


Foot MRI 5/29/18 0000 Signed





Impressions: 





 CONCLUSION: 





 1.  Significant bony edema in and around the first MTP joint including both of 





 the sesamoid bones. I suspect is related to hallux valgus deformity.





  





 





 2.  Linear edema and enhancement along the first metatarsal base and a pattern 





 suggesting a stress fracture





  





 





 3.  No evidence of osteomyelitis





  





 





  





 


 


Lower Extremity Angiography 5/23/18 0000 Signed





Impressions: 





 CONCLUSION: 





 1.  Segmental inflow stenosis in the left common iliac and proximal external il





 iac treated with 8 mm stent and 7 mm balloon angioplasty.





 2.  Short segment occlusion with multiple segments of high-grade stenosis at th





 e adductor hiatus bridging the distal SFA and proximal above-knee popliteal. Th





 is area was successfully treated with LS atherectomy and 5 mm balloon angioplas





 ty as detailed above.





  





 


 


Aorta w/Runoff CTA 5/20/18 0000 Signed





Impressions: 





 Service Date/Time:  Ron, May 20, 2018 17:02 - CONCLUSION:  1. Bladder is 





 severely distended which may reflect bladder outlet obstruction or neurogenic 





 bladder. Patient may benefit from Roman decompression. 2. No significant 

aortic 





 stenosis. 3. Occluded right common iliac origin with reconstitution of the 





 common femoral artery. 4. Moderate long segment stenosis of the left common 





 iliac artery and proximal left external iliac artery. 5. Heavily calcified 





 common femoral bifurcations bilaterally with diffusely diseased profunda. 6. 





 Diffusely diseased bilateral SFA with multiple severe stenoses of the right 

SFA 





 in the mid to distal thigh and short segment occlusion of the left SFA at the 





 adductor canal. 7. Limited two-vessel runoff bilaterally, as above.     

Yvan Sanon MD 








Objective Remarks


GENERAL: This is a well-nourished, well-developed patient, in no apparent 

distress.


CARDIOVASCULAR: Regular rate and regular rhythm without murmurs, gallops, or 

rubs. 


RESPIRATORY: Clear to auscultation. Breath sounds equal bilaterally. No wheezes

, rales, or rhonchi.  


GASTROINTESTINAL: Abdomen soft, non-tender, nondistended. 


Normal, active bowel sounds


MUSCULOSKELETAL: left foot is swollen-seems to be improving.


NEURO:  Alert & Oriented x4 to person, place, time, situation.  Moves all ext x4


Procedures


Left common iliac and external iliac balloon angioplasty and stent,  


left SFA Hawk 1 endarterectomy and balloon angioplasty, left external 


iliac and common femoral endarterectomy patch angioplasty, right 


external iliac and common femoral endarterectomy with patch 


angioplasty and femoral-femoral bypass.


Medications and IVs





Inpatient Medications


Acetaminophen (Tylenol) 650 mg Q6H  PRN PO FEVER/PAIN SCALE 1 TO 2 Last 

administered on 5/25/18at 22:49;  Start 5/19/18 at 02:00


Acetaminophen/ Hydrocodone Bitart (Norco  5-325 Mg) 1 tab Q4H  PRN PO PAIN 

SCALE 3 TO 5 Last administered on 5/30/18at 21:12;  Start 5/19/18 at 02:00


Albumin Human 500 ml @  250 mls/hr NOW IV  Last administered on 5/24/18at 01:39

;  Start 5/24/18 at 01:45;  Stop 5/24/18 at 03:44;  Status DC


Bisacodyl (Dulcolax Supp) 10 mg DAILY  PRN RECTAL SEVERE CONSITIPATION;  Start 5 /19/18 at 02:00


Cefazolin Sodium/ Dextrose 50 ml @  100 mls/hr Q8H IV  Last administered on 6/5/ 18at 04:48;  Start 5/23/18 at 21:00


Cefazolin/Sodium Chloride 100 ml @  200 mls/hr Q8H IV  Last administered on 5/23 /18at 04:34;  Start 5/20/18 at 20:00;  Stop 5/23/18 at 21:06;  Status DC


Chlorhexidine Gluconate (Chlorhexidine 2% Cloth) 3 pack ON CALL  PRN TOPICAL 

SEE LABEL COMMENTS;  Start 5/29/18 at 17:45;  Stop 6/1/18 at 17:44;  Status DC


Clindamycin/ Sodium Chloride 50 ml @  100 mls/hr Q8H IV  Last administered on 5/ 19/18at 05:50;  Start 5/19/18 at 06:00;  Stop 5/19/18 at 10:37;  Status DC


Clopidogrel Bisulfate (Plavix) 75 mg DAILY PO  Last administered on 6/5/18at 09:

23;  Start 5/23/18 at 20:00


Lactated Ringer's 1,000 ml @  30 mls/hr Q24H PRN IV SEE LABEL COMMENTS;  Start 5 /29/18 at 17:45;  Stop 6/1/18 at 17:44;  Status DC


Lactulose (Lactulose Liq) 30 ml DAILY  PRN PO SEVERE CONSITIPATION;  Start 5/19/ 18 at 02:00


Magnesium Hydroxide (Milk Of Magnesia Liq) 30 ml Q12H  PRN PO Mild constipation

;  Start 5/19/18 at 02:00


Metoclopramide HCl (Reglan Inj) 5 mg Q6H  PRN IV PUSH NAUSEA OR VOMITING;  

Start 5/19/18 at 02:00


Miscellaneous Information (Misc Nursing Information) ALL NURSING DEPARTME... 

UNSCH  PRN .XX SEE LABEL COMMENTS;  Start 5/29/18 at 22:15;  Stop 5/30/18 at 22:

14;  Status DC


Miscellaneous Information ([facility] Ordered Lab Info) SPECIFIC LAB TO BE 

JUN... ONCE  ONCE .XX ;  Start 5/22/18 at 11:45;  Stop 5/22/18 at 11:45;  

Status DC


Morphine Sulfate (Morphine Inj) 2 mg Q3H  PRN IV PUSH Pain 6-10 Last 

administered on 5/19/18at 12:07;  Start 5/19/18 at 02:15


Pharmacy Profile Note 0 ml @ 0 mls/hr UNSCH OTHER ;  Start 5/19/18 at 10:45;  

Stop 5/20/18 at 19:38;  Status DC


Potassium Chloride (KCl Powder) 40 meq ONCE  ONCE PO  Last administered on 5/27/ 18at 11:45;  Start 5/27/18 at 11:45;  Stop 5/27/18 at 11:46;  Status DC


Povidone Iodine (Betadine 5% Antisepsis Kit) 1 applic ON CALL  PRN EACH NARE 

SEE LABEL COMMENTS;  Start 5/29/18 at 17:45;  Stop 6/1/18 at 17:44;  Status DC


Senna/Docusate Sodium (Yeimi-Colace) 1 tab BID PO ;  Start 5/19/18 at 09:00;  

Status Future Hold


Sennosides (Senokot) 17.2 mg Q12H  PRN PO Moderate constipation;  Start 5/19/18 

at 02:00


Sodium Chloride 500 ml @  30 mls/hr [id number] PRN IV SEE LABEL COMMENTS;  Start 5/ 29/18 at 17:45;  Stop 6/1/18 at 17:44;  Status DC


Sodium Chloride (NS Flush) 2 ml BID IV FLUSH  Last administered on 6/5/18at 09:

23;  Start 5/19/18 at 09:00


Sodium Chloride 1200 ml/Syringe / Bag 1,200 ml @  3,600 mls/hr BOLUS  STAT IV ;

  Start 5/26/18 at 12:19;  Stop 5/26/18 at 12:23;  Status DC


Tamsulosin HCl (Flomax) 0.4 mg DAILY PO  Last administered on 6/5/18at 09:23;  

Start 5/28/18 at 11:00


Vancomycin HCl 1000 mg/Sodium Chloride 250 ml @  250 mls/hr Q24H IV  Last 

administered on 5/20/18at 11:48;  Start 5/19/18 at 12:00;  Stop 5/20/18 at 19:38

;  Status DC





A/P


Problem List:  


(1) Cellulitis of left foot


ICD Code:  L03.116 - Cellulitis of left lower limb


Status:  Acute


(2) Intractable pain


ICD Code:  R52 - Pain, unspecified


Status:  Acute


Assessment and Plan


Sepsis


Resolved 





Left Foot Cellulitis


septic joint vs osteomyelitis


Continue cefazolin for 4 weeks


f/u with podiatry as outpatient.


ID following





Peripheral vascular disease


History of femorofemoral bypass


PVD contributory to limb/foot disease


Status post bilateral open and endovascular leg revascularization femorofemoral 

bypass


Continue Plavix


cleared by vascular surgery for discharge.





Hypotension


Resolved





Urinary obstruction 


hematuria


Follow for improvement


Monitor renal function


voiding trial as outpatient- per urology.


Flomax





DVT prophylaxis


SCDs


No chemoprophylaxis due to active bleeding


Discharge Planning


dc planning to SNF when authorization has been received.











Enmanuel Dutta MD Jun 5, 2018 10:58

## 2018-06-06 VITALS
OXYGEN SATURATION: 100 % | RESPIRATION RATE: 18 BRPM | HEART RATE: 78 BPM | SYSTOLIC BLOOD PRESSURE: 153 MMHG | TEMPERATURE: 98.2 F | DIASTOLIC BLOOD PRESSURE: 82 MMHG

## 2018-06-06 VITALS
HEART RATE: 87 BPM | RESPIRATION RATE: 16 BRPM | OXYGEN SATURATION: 100 % | TEMPERATURE: 98.7 F | DIASTOLIC BLOOD PRESSURE: 69 MMHG | SYSTOLIC BLOOD PRESSURE: 121 MMHG

## 2018-06-06 VITALS
DIASTOLIC BLOOD PRESSURE: 63 MMHG | HEART RATE: 105 BPM | SYSTOLIC BLOOD PRESSURE: 123 MMHG | RESPIRATION RATE: 18 BRPM | TEMPERATURE: 98.4 F | OXYGEN SATURATION: 100 %

## 2018-06-06 VITALS
RESPIRATION RATE: 18 BRPM | SYSTOLIC BLOOD PRESSURE: 100 MMHG | TEMPERATURE: 98.4 F | OXYGEN SATURATION: 100 % | HEART RATE: 98 BPM | DIASTOLIC BLOOD PRESSURE: 59 MMHG

## 2018-06-06 VITALS
HEART RATE: 89 BPM | DIASTOLIC BLOOD PRESSURE: 57 MMHG | RESPIRATION RATE: 18 BRPM | OXYGEN SATURATION: 100 % | SYSTOLIC BLOOD PRESSURE: 112 MMHG | TEMPERATURE: 98 F

## 2018-06-06 VITALS
HEART RATE: 84 BPM | OXYGEN SATURATION: 100 % | TEMPERATURE: 97.9 F | SYSTOLIC BLOOD PRESSURE: 124 MMHG | RESPIRATION RATE: 16 BRPM | DIASTOLIC BLOOD PRESSURE: 72 MMHG

## 2018-06-06 LAB
AMORPHOUS SEDIMENT, URINE: (no result)
BACTERIA #/AREA URNS HPF: (no result) /HPF
COLOR UR: YELLOW
GLUCOSE UR STRIP-MCNC: (no result) MG/DL
HGB UR QL STRIP: (no result)
KETONES UR STRIP-MCNC: (no result) MG/DL
NITRITE UR QL STRIP: (no result)
SP GR UR STRIP: 1.02 (ref 1–1.03)
SQUAMOUS #/AREA URNS HPF: <1 /HPF (ref 0–5)
URINE LEUKOCYTE ESTERASE: (no result)

## 2018-06-06 RX ADMIN — Medication SCH ML: at 20:32

## 2018-06-06 RX ADMIN — CEFAZOLIN SODIUM SCH MLS/HR: 2 SOLUTION INTRAVENOUS at 20:27

## 2018-06-06 RX ADMIN — HYDROCODONE BITARTRATE AND ACETAMINOPHEN PRN TAB: 5; 325 TABLET ORAL at 21:07

## 2018-06-06 RX ADMIN — CEFAZOLIN SODIUM SCH MLS/HR: 2 SOLUTION INTRAVENOUS at 16:29

## 2018-06-06 RX ADMIN — CEFAZOLIN SODIUM SCH MLS/HR: 2 SOLUTION INTRAVENOUS at 05:55

## 2018-06-06 RX ADMIN — Medication SCH ML: at 09:48

## 2018-06-06 RX ADMIN — TAMSULOSIN HYDROCHLORIDE SCH MG: 0.4 CAPSULE ORAL at 09:47

## 2018-06-06 RX ADMIN — CLOPIDOGREL BISULFATE SCH MG: 75 TABLET, FILM COATED ORAL at 09:48

## 2018-06-06 NOTE — MP
cc:

Kelvin Cahndra DPM, Hilaree DPM

****

 

 

DATE OF OPERATION:

05/29/2018

 

INDICATIONS FOR PROCEDURE:

The patient was seen in the hospital with a left foot ulcer with 

possible osteomyelitis of the left first metatarsal head.  He had a 

wound with exposed bone.  I discussed with the patient the risks, 

benefits, potential complications of surgery and that he would benefit

from removing this bone, that there was a greater than 90% chance that

the bone was infected, that we could send a piece as a bone biopsy.  

He agreed to undergo bone biopsy with excision of the wound to the 

left foot.

 

PROCEDURE IN DETAIL:

He was seen in preop holding by myself, nursing staff, and anesthesia 

where the correct patient, side and site were all confirmed to  be 

correct and the left foot.  He was then taken to the surgical suite in

supine position.  The left foot was prepped and draped in normal 

sterile fashion.

 

Following timeout as per facility protocol, attention was directed to 

the left medial aspect of the forefoot, where there was noted to be a 

wound down to exposed bone at the level of the medial first metatarsal

head with mild serous drainage present.  At that time, an incision was

made at the dorsal linear aspect of the first metatarsophalangeal 

joint, encompassing the wound down to the level of bone and bone that 

was at the level of the medial eminence of the first was abductor 

hallux was dissected  and sent to pathology as bone biopsy.  Culture 

was taken of the wound followed by irrigation with normal saline and 

closure with 3-0 Vicryl and 2-0 nylon suture.  There was no gross 

purulence or debris noted within the wound.  It appeared to be clean 

and healthy and bled readily.  A dressing consisting of Xeroform, 4 x 

4's, cast padding, and Ace bandage was applied to the left foot.  He 

will be weightbearing as tolerated and left in surgical shoe and we 

will await bone biopsy results to determine further treatment.

SHORT OPERATIVE NOTE:

 

SURGEON:

Raúl Chandra DPM

 

ASSISTANT:

Staff.

 

PREOPERATIVE DIAGNOSIS:

Left foot ulcer with possible osteomyelitis, left first metatarsal 

head.

 

POSTOPERATIVE DIAGNOSIS:

Left foot ulcer with possible osteomyelitis, left first metatarsal 

head.

 

PROCEDURE PERFORMED:

Bone biopsy, left foot.

 

PATHOLOGY:

1.  Bone biopsy, left first metatarsal head.

2.  Culture left foot.

 

ANESTHESIA:

General endotracheal anesthesia plus local consisting of 10 mL of 

0.75% Marcaine plain.

 

ESTIMATED BLOOD LOSS:

10 mL

 

TOURNIQUET TIME:

No tourniquet utilized.

 

CONDITION:

Stable to PACU.

 

COMPLICATIONS:

None.

 

DISPOSITION:

Weightbearing as tolerated with left surgical shoe, await bone biopsy 

results to determine further treatment.

 

 

__________________________________

JENIFFER Baldwin/

D: 06/06/2018, 08:06 PM

T: 06/06/2018, 08:55 PM

Visit #: [id number]

Job #: [id number]

## 2018-06-06 NOTE — HHI.PR
Subjective


Remarks


in no acute distress.


pain is controlled.


urine looks dark.


no fever.


d/w the RN and no acute issues over night.





Objective


Vitals





Vital Signs








  Date Time  Temp Pulse Resp B/P (MAP) Pulse Ox O2 Delivery O2 Flow Rate FiO2


 


6/6/18 04:00 98.4 105 18 123/63 (83) 100   


 


6/6/18 00:00 98.0 89 18 112/57 (75) 100   


 


6/5/18 20:00 98.1 92 18 117/59 (78) 100   


 


6/5/18 16:00 98.0 74 18 151/74 (99) 99   


 


6/5/18 12:00 98.2 79 18 154/77 (102) 99   














I/O      


 


 6/5/18 6/5/18 6/5/18 6/6/18 6/6/18 6/6/18





 07:00 15:00 23:00 07:00 15:00 23:00


 


Intake Total 50 ml  50 ml 50 ml  


 


Output Total 200 ml 1250 ml 150 ml 500 ml  


 


Balance -150 ml -1250 ml -100 ml -450 ml  


 


      


 


IV Total 50 ml  50 ml 50 ml  


 


Output Urine Total 200 ml 1250 ml 150 ml 500 ml  


 


# Bowel Movements 1   2  








Result Diagram:  


6/3/18 0539                                                                    

            6/3/18 0559





Imaging





Last Impressions








Foot X-Ray 5/29/18 0000 Signed





Impressions: 





 CONCLUSION: 





 Postsurgical changes characteristic of a bunionectomy.





  





 





  





 





  





 





  





 


 


Foot MRI 5/29/18 0000 Signed





Impressions: 





 CONCLUSION: 





 1.  Significant bony edema in and around the first MTP joint including both of 





 the sesamoid bones. I suspect is related to hallux valgus deformity.





  





 





 2.  Linear edema and enhancement along the first metatarsal base and a pattern 





 suggesting a stress fracture





  





 





 3.  No evidence of osteomyelitis





  





 





  





 


 


Lower Extremity Angiography 5/23/18 0000 Signed





Impressions: 





 CONCLUSION: 





 1.  Segmental inflow stenosis in the left common iliac and proximal external il





 iac treated with 8 mm stent and 7 mm balloon angioplasty.





 2.  Short segment occlusion with multiple segments of high-grade stenosis at th





 e adductor hiatus bridging the distal SFA and proximal above-knee popliteal. Th





 is area was successfully treated with LS atherectomy and 5 mm balloon angioplas





 ty as detailed above.





  





 


 


Aorta w/Runoff CTA 5/20/18 0000 Signed





Impressions: 





 Service Date/Time:  Ron, May 20, 2018 17:02 - CONCLUSION:  1. Bladder is 





 severely distended which may reflect bladder outlet obstruction or neurogenic 





 bladder. Patient may benefit from Roman decompression. 2. No significant 

aortic 





 stenosis. 3. Occluded right common iliac origin with reconstitution of the 





 common femoral artery. 4. Moderate long segment stenosis of the left common 





 iliac artery and proximal left external iliac artery. 5. Heavily calcified 





 common femoral bifurcations bilaterally with diffusely diseased profunda. 6. 





 Diffusely diseased bilateral SFA with multiple severe stenoses of the right 

SFA 





 in the mid to distal thigh and short segment occlusion of the left SFA at the 





 adductor canal. 7. Limited two-vessel runoff bilaterally, as above.     

Yvan Sanon MD 








Objective Remarks


GENERAL: This is a well-nourished, well-developed patient, in no apparent 

distress.


CARDIOVASCULAR: Regular rate and regular rhythm without murmurs, gallops, or 

rubs. 


RESPIRATORY: Clear to auscultation. Breath sounds equal bilaterally. No wheezes

, rales, or rhonchi.  


GASTROINTESTINAL: Abdomen soft, non-tender, nondistended. 


Normal, active bowel sounds


MUSCULOSKELETAL: left foot is swollen-seems to be improving.


NEURO:  Alert & Oriented x4 to person, place, time, situation.  Moves all ext x4


Procedures


Left common iliac and external iliac balloon angioplasty and stent,  


left SFA Hawk 1 endarterectomy and balloon angioplasty, left external 


iliac and common femoral endarterectomy patch angioplasty, right 


external iliac and common femoral endarterectomy with patch 


angioplasty and femoral-femoral bypass.


Medications and IVs





Inpatient Medications


Acetaminophen (Tylenol) 650 mg Q6H  PRN PO FEVER/PAIN SCALE 1 TO 2 Last 

administered on 5/25/18at 22:49;  Start 5/19/18 at 02:00


Acetaminophen/ Hydrocodone Bitart (Norco  5-325 Mg) 1 tab Q4H  PRN PO PAIN 

SCALE 3 TO 5 Last administered on 6/5/18at 20:36;  Start 5/19/18 at 02:00


Albumin Human 500 ml @  250 mls/hr NOW IV  Last administered on 5/24/18at 01:39

;  Start 5/24/18 at 01:45;  Stop 5/24/18 at 03:44;  Status DC


Bisacodyl (Dulcolax Supp) 10 mg DAILY  PRN RECTAL SEVERE CONSITIPATION;  Start 5 /19/18 at 02:00


Cefazolin Sodium/ Dextrose 50 ml @  100 mls/hr Q8H IV  Last administered on 6/6/ 18at 05:55;  Start 5/23/18 at 21:00


Cefazolin/Sodium Chloride 100 ml @  200 mls/hr Q8H IV  Last administered on 5/23 /18at 04:34;  Start 5/20/18 at 20:00;  Stop 5/23/18 at 21:06;  Status DC


Chlorhexidine Gluconate (Chlorhexidine 2% Cloth) 3 pack ON CALL  PRN TOPICAL 

SEE LABEL COMMENTS;  Start 5/29/18 at 17:45;  Stop 6/1/18 at 17:44;  Status DC


Clindamycin/ Sodium Chloride 50 ml @  100 mls/hr Q8H IV  Last administered on 5/ 19/18at 05:50;  Start 5/19/18 at 06:00;  Stop 5/19/18 at 10:37;  Status DC


Clopidogrel Bisulfate (Plavix) 75 mg DAILY PO  Last administered on 6/6/18at 09:

48;  Start 5/23/18 at 20:00


Lactated Ringer's 1,000 ml @  30 mls/hr Q24H PRN IV SEE LABEL COMMENTS;  Start 5 /29/18 at 17:45;  Stop 6/1/18 at 17:44;  Status DC


Lactulose (Lactulose Liq) 30 ml DAILY  PRN PO SEVERE CONSITIPATION;  Start 5/19/ 18 at 02:00


Magnesium Hydroxide (Milk Of Magnesia Liq) 30 ml Q12H  PRN PO Mild constipation

;  Start 5/19/18 at 02:00


Metoclopramide HCl (Reglan Inj) 5 mg Q6H  PRN IV PUSH NAUSEA OR VOMITING;  

Start 5/19/18 at 02:00


Miscellaneous Information (Misc Nursing Information) ALL NURSING DEPARTME... 

UNSCH  PRN .XX SEE LABEL COMMENTS;  Start 5/29/18 at 22:15;  Stop 5/30/18 at 22:

14;  Status DC


Miscellaneous Information ([facility] Ordered Lab Info) SPECIFIC LAB TO BE 

JUN... ONCE  ONCE .XX ;  Start 5/22/18 at 11:45;  Stop 5/22/18 at 11:45;  

Status DC


Morphine Sulfate (Morphine Inj) 2 mg Q3H  PRN IV PUSH Pain 6-10 Last 

administered on 5/19/18at 12:07;  Start 5/19/18 at 02:15


Pharmacy Profile Note 0 ml @ 0 mls/hr UNSCH OTHER ;  Start 5/19/18 at 10:45;  

Stop 5/20/18 at 19:38;  Status DC


Potassium Chloride (KCl Powder) 40 meq ONCE  ONCE PO  Last administered on 5/27/ 18at 11:45;  Start 5/27/18 at 11:45;  Stop 5/27/18 at 11:46;  Status DC


Povidone Iodine (Betadine 5% Antisepsis Kit) 1 applic ON CALL  PRN EACH NARE 

SEE LABEL COMMENTS;  Start 5/29/18 at 17:45;  Stop 6/1/18 at 17:44;  Status DC


Senna/Docusate Sodium (Yeimi-Colace) 1 tab BID PO ;  Start 5/19/18 at 09:00;  

Status Future Hold


Sennosides (Senokot) 17.2 mg Q12H  PRN PO Moderate constipation;  Start 5/19/18 

at 02:00


Sodium Chloride 500 ml @  30 mls/hr [id number] PRN IV SEE LABEL COMMENTS;  Start 5/ 29/18 at 17:45;  Stop 6/1/18 at 17:44;  Status DC


Sodium Chloride (NS Flush) 2 ml BID IV FLUSH  Last administered on 6/6/18at 09:

48;  Start 5/19/18 at 09:00


Sodium Chloride 1200 ml/Syringe / Bag 1,200 ml @  3,600 mls/hr BOLUS  STAT IV ;

  Start 5/26/18 at 12:19;  Stop 5/26/18 at 12:23;  Status DC


Tamsulosin HCl (Flomax) 0.4 mg DAILY PO  Last administered on 6/6/18at 09:47;  

Start 5/28/18 at 11:00


Vancomycin HCl 1000 mg/Sodium Chloride 250 ml @  250 mls/hr Q24H IV  Last 

administered on 5/20/18at 11:48;  Start 5/19/18 at 12:00;  Stop 5/20/18 at 19:38

;  Status DC





A/P


Problem List:  


(1) Cellulitis of left foot


ICD Code:  L03.116 - Cellulitis of left lower limb


Status:  Acute


(2) Intractable pain


ICD Code:  R52 - Pain, unspecified


Status:  Acute


Assessment and Plan


Sepsis


Resolved 





Left Foot Cellulitis


septic joint vs osteomyelitis


Continue cefazolin for 4 weeks


f/u with podiatry as outpatient.


ID following





Peripheral vascular disease


History of femorofemoral bypass


PVD contributory to limb/foot disease


Status post bilateral open and endovascular leg revascularization femorofemoral 

bypass


Continue Plavix


cleared by vascular surgery for discharge.





Hypotension


Resolved





Urinary obstruction 


hematuria


repeat UA


Monitor renal function


voiding trial as outpatient- per urology.


Flomax





DVT prophylaxis


SCDs


No chemoprophylaxis due to active bleeding


Discharge Planning


dc planning to SNF when authorization has been received.


f/u; pcp, ortho, vascular surgery.


see med list.


d/w the patient and RN.


d/w the case management.





time spent 35 min.











Enmanuel Dutta MD Jun 6, 2018 10:30

## 2018-06-06 NOTE — HHI.DS
__________________________________________________





Discharge Summary


Admission Date


May 19, 2018 at 12:49


Discharge Date:  Jun 6, 2018


Admitting Diagnosis





Left foot cellulitis, intractable foot pain, R/O osteomyelitis





(1) Cellulitis of left foot


ICD Code:  L03.116 - Cellulitis of left lower limb


Diagnosis:  Principal


Status:  Acute


(2) Intractable pain


ICD Code:  R52 - Pain, unspecified


Diagnosis:  Principal


Status:  Acute


Procedures


Left common iliac and external iliac balloon angioplasty and stent,  


left SFA Hawk 1 endarterectomy and balloon angioplasty, left external 


iliac and common femoral endarterectomy patch angioplasty, right 


external iliac and common femoral endarterectomy with patch 


angioplasty and femoral-femoral bypass.


Brief History - From Admission


This is a 62-year-old male with a PMH of HTN, Hyperlipidemia, BPH and h/o CVA w

/ Left-Sided Weakness who was brought to the ER for left foot pain and 

swelling.  Notes ongoing swelling and tenderness to left great toe for approx 

1mo, however symptoms progressively worse since yesterday.  Now w/ foul-

smelling drainage.  Pain is constant, severe, 8/10, non-radiating, worse w/ 

ambulation.  [facility] for evaluation.  Denies 

fever or chills.  On arrival, /54, HR 98, O2 sat 99% RA, Afebrile.  WBC 

14.5.  Chemistry unremarkable.  CRP 2.0.  INR 1.1.  Foot X-ray suspected 

chronic deformity of hindfoot.  MRI Foot with no definitive evidence of 

osteomyelitis however unusual multifocal marrow edema throughout left foot and 

ankle, nonspecific.  S/p Clinda in ER in addition to multiple doses of pain 

medication w/ minimal improvement.


CBC/BMP:  


6/3/18 0539                                                                    

            6/3/18 0559





Imaging





Last Impressions








Foot X-Ray 5/29/18 0000 Signed





Impressions: 





 CONCLUSION: 





 Postsurgical changes characteristic of a bunionectomy.





  





 





  





 





  





 





  





 


 


Foot MRI 5/29/18 0000 Signed





Impressions: 





 CONCLUSION: 





 1.  Significant bony edema in and around the first MTP joint including both of 





 the sesamoid bones. I suspect is related to hallux valgus deformity.





  





 





 2.  Linear edema and enhancement along the first metatarsal base and a pattern 





 suggesting a stress fracture





  





 





 3.  No evidence of osteomyelitis





  





 





  





 


 


Lower Extremity Angiography 5/23/18 0000 Signed





Impressions: 





 CONCLUSION: 





 1.  Segmental inflow stenosis in the left common iliac and proximal external il





 iac treated with 8 mm stent and 7 mm balloon angioplasty.





 2.  Short segment occlusion with multiple segments of high-grade stenosis at th





 e adductor hiatus bridging the distal SFA and proximal above-knee popliteal. Th





 is area was successfully treated with LS atherectomy and 5 mm balloon angioplas





 ty as detailed above.





  





 


 


Aorta w/Runoff CTA 5/20/18 0000 Signed





Impressions: 





 Service Date/Time:  Ron, May 20, 2018 17:02 - CONCLUSION:  1. Bladder is 





 severely distended which may reflect bladder outlet obstruction or neurogenic 





 bladder. Patient may benefit from Roman decompression. 2. No significant 

aortic 





 stenosis. 3. Occluded right common iliac origin with reconstitution of the 





 common femoral artery. 4. Moderate long segment stenosis of the left common 





 iliac artery and proximal left external iliac artery. 5. Heavily calcified 





 common femoral bifurcations bilaterally with diffusely diseased profunda. 6. 





 Diffusely diseased bilateral SFA with multiple severe stenoses of the right 

SFA 





 in the mid to distal thigh and short segment occlusion of the left SFA at the 





 adductor canal. 7. Limited two-vessel runoff bilaterally, as above.     

Yvan Sanon MD 








PE at Discharge


GENERAL: Not in distress


CARDIOVASCULAR: Regular rate and regular rhythm without murmurs, gallops, or 

rubs. 


RESPIRATORY: Clear to auscultation. Breath sounds equal bilaterally. No wheezes

, rales, or rhonchi.  


GASTROINTESTINAL: Abdomen soft, mild suprapubic tenderness, Romna catheter in 

place, draining yellowish urine, previously bloody.


MUSCULOSKELETAL: Left foot swelling improved, bilateral inguinal area, no 

significant hematoma.


NEURO:  Alert & Oriented x4 to person, place, time, situation.  Left hemiparesis

,


Hospital Course


Sepsis


Resolved 





Left Foot Cellulitis


septic joint vs osteomyelitis


Continue cefazolin for 4 weeks


f/u with podiatry as outpatient.


ID following





Peripheral vascular disease


History of femorofemoral bypass


PVD contributory to limb/foot disease


Status post bilateral open and endovascular leg revascularization femorofemoral 

bypass


Continue Plavix


cleared by vascular surgery for discharge.





Hypotension


Resolved





Urinary obstruction 


hematuria


repeat UA


Monitor renal function


voiding trial as outpatient- per urology.


Flomax





DVT prophylaxis


SCDs


Pt Condition on Discharge:  Fair


Discharge Disposition:  Discharge to SNF


Discharge Time:  > 30 minutes


Discharge Instructions


DIET: Follow Instructions for:  Heart Healthy Diet


Activities you can perform:  Regular-No Restrictions











Enmanuel Dutta MD Jun 6, 2018 14:01

## 2018-06-07 VITALS
DIASTOLIC BLOOD PRESSURE: 53 MMHG | HEART RATE: 76 BPM | OXYGEN SATURATION: 99 % | TEMPERATURE: 98.6 F | RESPIRATION RATE: 18 BRPM | SYSTOLIC BLOOD PRESSURE: 105 MMHG

## 2018-06-07 VITALS
HEART RATE: 88 BPM | RESPIRATION RATE: 18 BRPM | DIASTOLIC BLOOD PRESSURE: 59 MMHG | TEMPERATURE: 98 F | OXYGEN SATURATION: 100 % | SYSTOLIC BLOOD PRESSURE: 108 MMHG

## 2018-06-07 VITALS
OXYGEN SATURATION: 100 % | TEMPERATURE: 98.4 F | RESPIRATION RATE: 18 BRPM | SYSTOLIC BLOOD PRESSURE: 129 MMHG | HEART RATE: 87 BPM | DIASTOLIC BLOOD PRESSURE: 67 MMHG

## 2018-06-07 VITALS
SYSTOLIC BLOOD PRESSURE: 118 MMHG | TEMPERATURE: 98.1 F | RESPIRATION RATE: 18 BRPM | HEART RATE: 82 BPM | DIASTOLIC BLOOD PRESSURE: 64 MMHG | OXYGEN SATURATION: 98 %

## 2018-06-07 VITALS
TEMPERATURE: 98.2 F | OXYGEN SATURATION: 100 % | HEART RATE: 82 BPM | RESPIRATION RATE: 18 BRPM | DIASTOLIC BLOOD PRESSURE: 66 MMHG | SYSTOLIC BLOOD PRESSURE: 124 MMHG

## 2018-06-07 VITALS
DIASTOLIC BLOOD PRESSURE: 62 MMHG | TEMPERATURE: 97.9 F | SYSTOLIC BLOOD PRESSURE: 129 MMHG | OXYGEN SATURATION: 99 % | RESPIRATION RATE: 18 BRPM | HEART RATE: 18 BPM

## 2018-06-07 RX ADMIN — CLOPIDOGREL BISULFATE SCH MG: 75 TABLET, FILM COATED ORAL at 09:09

## 2018-06-07 RX ADMIN — HYDROCODONE BITARTRATE AND ACETAMINOPHEN PRN TAB: 5; 325 TABLET ORAL at 09:09

## 2018-06-07 RX ADMIN — CEFAZOLIN SODIUM SCH MLS/HR: 2 SOLUTION INTRAVENOUS at 05:54

## 2018-06-07 RX ADMIN — AZTREONAM SCH MLS/HR: 2 INJECTION, POWDER, LYOPHILIZED, FOR SOLUTION INTRAMUSCULAR; INTRAVENOUS at 14:34

## 2018-06-07 RX ADMIN — CEFAZOLIN SODIUM SCH MLS/HR: 2 SOLUTION INTRAVENOUS at 13:50

## 2018-06-07 RX ADMIN — Medication SCH ML: at 21:09

## 2018-06-07 RX ADMIN — CEFAZOLIN SODIUM SCH MLS/HR: 2 SOLUTION INTRAVENOUS at 21:09

## 2018-06-07 RX ADMIN — TAMSULOSIN HYDROCHLORIDE SCH MG: 0.4 CAPSULE ORAL at 09:09

## 2018-06-07 RX ADMIN — AZTREONAM SCH MLS/HR: 2 INJECTION, POWDER, LYOPHILIZED, FOR SOLUTION INTRAMUSCULAR; INTRAVENOUS at 21:42

## 2018-06-07 RX ADMIN — Medication SCH ML: at 09:10

## 2018-06-07 NOTE — HHI.IDPN
Subjective


Subjective


Remarks


Dr Al asked me to re-evaluate th e pt 2/2 UTI


Pt with chronic chavez


Noted hematuria


UA cw infx ( WBC -innum, + LE), growing PSAE in urine culture


I added azactam


Antibiotics


cefazoline


azactam


Allergies:  


Coded Allergies:  


     No Known Allergies (Verified  Allergy, Unknown, 3/5/18)





Objective


.





Vital Signs








  Date Time  Temp Pulse Resp B/P (MAP) Pulse Ox O2 Delivery O2 Flow Rate FiO2


 


6/7/18 12:00 97.9 18 18 129/62 (84) 99   


 


6/7/18 08:00 98.1 82 18 118/64 (82) 98   


 


6/7/18 04:00 98.4 87 18 129/67 (87) 100   


 


6/7/18 00:00 98.6 76 18 105/53 (70) 99   


 


6/6/18 20:00 98.4 98 18 100/59 (73) 100   








.





Microbiology








 Date/Time


Source Procedure


Growth Status


 


 


 6/6/18 14:20


Urine Catheterized Urine Urine Culture - Preliminary


Pseudomonas Species Resulted








Imaging





Last Impressions








Foot X-Ray 5/29/18 0000 Signed





Impressions: 





 CONCLUSION: 





 Postsurgical changes characteristic of a bunionectomy.





  





 





  





 





  





 





  





 


 


Foot MRI 5/29/18 0000 Signed





Impressions: 





 CONCLUSION: 





 1.  Significant bony edema in and around the first MTP joint including both of 





 the sesamoid bones. I suspect is related to hallux valgus deformity.





  





 





 2.  Linear edema and enhancement along the first metatarsal base and a pattern 





 suggesting a stress fracture





  





 





 3.  No evidence of osteomyelitis





  





 





  





 


 


Lower Extremity Angiography 5/23/18 0000 Signed





Impressions: 





 CONCLUSION: 





 1.  Segmental inflow stenosis in the left common iliac and proximal external il





 iac treated with 8 mm stent and 7 mm balloon angioplasty.





 2.  Short segment occlusion with multiple segments of high-grade stenosis at th





 e adductor hiatus bridging the distal SFA and proximal above-knee popliteal. Th





 is area was successfully treated with LS atherectomy and 5 mm balloon angioplas





 ty as detailed above.





  





 


 


Aorta w/Runoff CTA 5/20/18 0000 Signed





Impressions: 





 Service Date/Time:  Ron, May 20, 2018 17:02 - CONCLUSION:  1. Bladder is 





 severely distended which may reflect bladder outlet obstruction or neurogenic 





 bladder. Patient may benefit from Chavez decompression. 2. No significant 

aortic 





 stenosis. 3. Occluded right common iliac origin with reconstitution of the 





 common femoral artery. 4. Moderate long segment stenosis of the left common 





 iliac artery and proximal left external iliac artery. 5. Heavily calcified 





 common femoral bifurcations bilaterally with diffusely diseased profunda. 6. 





 Diffusely diseased bilateral SFA with multiple severe stenoses of the right 

SFA 





 in the mid to distal thigh and short segment occlusion of the left SFA at the 





 adductor canal. 7. Limited two-vessel runoff bilaterally, as above.     

Yvan Sanon MD 








Physical Exam


CONSTITUTIONAL/GENERAL: This is a thin elderly male  patient, in no apparent 

distress.


TUBES/LINES/DRAINS:


SKIN: No jaundice, rashes, or lesions. Ecchymoses on upper extremities. No 

wounds seen anteriorly. Skin temperature appropriate. Not diaphoretic. 


 CARDIOVASCULAR: Regular rate and rhythm without murmurs, gallops, or rubs. No 

JVD. Peripheral pulses symmetric.


RESPIRATORY/CHEST: Symmetric, unlabored respirations. Clear to auscultation. 

Breath sounds equal bilaterally. No wheezes, rales, or rhonchi.  


GASTROINTESTINAL: Abdomen soft, non-tender, nondistended. No hepato-splenomegaly

, or palpable masses. No guarding. Bowel sounds present.


GENITOURINARY: Without palpable bladder distension.  chavez in place with cloudy 

yellow uyrine


MUSCULOSKELETAL: Extremities without clubbing, cyanosis, 


Fett are warm


Non tender


Lt foot incision is dry and well approximated 


 NEUROLOGICAL: Awake and alert. Baseline L hemiparesis . Follows commands. 

Confused. Moves all extremities.


PSYCHIATRIC: No obvious anxiety/depression. no apparent hallucinations or other 

psychotic thought process.





Assessment & Plan


Remarks


MSSA infected 1st MT wound , left


   - osteo - confirmed on path


   - clx neg can be 2/2 prior abx use


MSSA sepsis - 2/2 foot infection


    2 D echo w/o vegetations


Underlying moderate to severe PVD


 Bladder outlet obstruction - chavez placed 


   - UA/C+S not cw inf


  viridans strep bacteremia ? significance


    - vir strep is common skin contaminant and in low grade bacteremia likely 

contaminastion


More pain, purulence of Lt 1 MT 


    sp I+D


    clx P


abx associated diarrhea, c.diff negative 


PSAE UTI - new issue; S P





   cont  cefazolin


         complete 6 weeks of abx 


    azactam for UTI





dw Charu Le MD Jun 7, 2018 16:23

## 2018-06-07 NOTE — HHI.PR
Subjective


Remarks


in no acute distress.


denies pain.


no fever.


hematuria has much improved.


no new complaints.





Objective


Vitals





Vital Signs








  Date Time  Temp Pulse Resp B/P (MAP) Pulse Ox O2 Delivery O2 Flow Rate FiO2


 


6/7/18 08:00 98.1 82 18 118/64 (82) 98   


 


6/7/18 04:00 98.4 87 18 129/67 (87) 100   


 


6/7/18 00:00 98.6 76 18 105/53 (70) 99   


 


6/6/18 20:00 98.4 98 18 100/59 (73) 100   


 


6/6/18 16:00 97.9 84 16 124/72 (89) 100   


 


6/6/18 12:00 98.7 87 16 121/69 (86) 100   














I/O      


 


 6/6/18 6/6/18 6/6/18 6/7/18 6/7/18 6/7/18





 07:00 15:00 23:00 07:00 15:00 23:00


 


Intake Total 50 ml     


 


Output Total 500 ml   1100 ml  


 


Balance -450 ml   -1100 ml  


 


      


 


IV Total 50 ml     


 


Output Urine Total 500 ml   1100 ml  


 


# Voids    2  


 


# Bowel Movements 2     








Result Diagram:  


6/3/18 0539                                                                    

            6/3/18 0559





Imaging





Last Impressions








Foot X-Ray 5/29/18 0000 Signed





Impressions: 





 CONCLUSION: 





 Postsurgical changes characteristic of a bunionectomy.





  





 





  





 





  





 





  





 


 


Foot MRI 5/29/18 0000 Signed





Impressions: 





 CONCLUSION: 





 1.  Significant bony edema in and around the first MTP joint including both of 





 the sesamoid bones. I suspect is related to hallux valgus deformity.





  





 





 2.  Linear edema and enhancement along the first metatarsal base and a pattern 





 suggesting a stress fracture





  





 





 3.  No evidence of osteomyelitis





  





 





  





 


 


Lower Extremity Angiography 5/23/18 0000 Signed





Impressions: 





 CONCLUSION: 





 1.  Segmental inflow stenosis in the left common iliac and proximal external il





 iac treated with 8 mm stent and 7 mm balloon angioplasty.





 2.  Short segment occlusion with multiple segments of high-grade stenosis at th





 e adductor hiatus bridging the distal SFA and proximal above-knee popliteal. Th





 is area was successfully treated with LS atherectomy and 5 mm balloon angioplas





 ty as detailed above.





  





 


 


Aorta w/Runoff CTA 5/20/18 0000 Signed





Impressions: 





 Service Date/Time:  Ron, May 20, 2018 17:02 - CONCLUSION:  1. Bladder is 





 severely distended which may reflect bladder outlet obstruction or neurogenic 





 bladder. Patient may benefit from Roman decompression. 2. No significant 

aortic 





 stenosis. 3. Occluded right common iliac origin with reconstitution of the 





 common femoral artery. 4. Moderate long segment stenosis of the left common 





 iliac artery and proximal left external iliac artery. 5. Heavily calcified 





 common femoral bifurcations bilaterally with diffusely diseased profunda. 6. 





 Diffusely diseased bilateral SFA with multiple severe stenoses of the right 

SFA 





 in the mid to distal thigh and short segment occlusion of the left SFA at the 





 adductor canal. 7. Limited two-vessel runoff bilaterally, as above.     

Yvan Sanon MD 








Objective Remarks


GENERAL: This is a well-nourished, well-developed patient, in no apparent 

distress.


CARDIOVASCULAR: Regular rate and regular rhythm without murmurs, gallops, or 

rubs. 


RESPIRATORY: Clear to auscultation. Breath sounds equal bilaterally. No wheezes

, rales, or rhonchi.  


GASTROINTESTINAL: Abdomen soft, non-tender, nondistended. 


Normal, active bowel sounds


MUSCULOSKELETAL: left foot is swollen-seems to be improving.


NEURO:  Alert & Oriented x4 to person, place, time, situation.  Moves all ext x4


Procedures


Left common iliac and external iliac balloon angioplasty and stent,  


left SFA Hawk 1 endarterectomy and balloon angioplasty, left external 


iliac and common femoral endarterectomy patch angioplasty, right 


external iliac and common femoral endarterectomy with patch 


angioplasty and femoral-femoral bypass.


Medications and IVs





Inpatient Medications


Acetaminophen (Tylenol) 650 mg Q6H  PRN PO FEVER/PAIN SCALE 1 TO 2 Last 

administered on 5/25/18at 22:49;  Start 5/19/18 at 02:00


Acetaminophen/ Hydrocodone Bitart (Norco  5-325 Mg) 1 tab Q4H  PRN PO PAIN 

SCALE 3 TO 5 Last administered on 6/7/18at 09:09;  Start 5/19/18 at 02:00


Albumin Human 500 ml @  250 mls/hr NOW IV  Last administered on 5/24/18at 01:39

;  Start 5/24/18 at 01:45;  Stop 5/24/18 at 03:44;  Status DC


Bisacodyl (Dulcolax Supp) 10 mg DAILY  PRN RECTAL SEVERE CONSITIPATION;  Start 5 /19/18 at 02:00


Cefazolin Sodium/ Dextrose 50 ml @  100 mls/hr Q8H IV  Last administered on 6/7/ 18at 05:54;  Start 5/23/18 at 21:00


Cefazolin/Sodium Chloride 100 ml @  200 mls/hr Q8H IV  Last administered on 5/23 /18at 04:34;  Start 5/20/18 at 20:00;  Stop 5/23/18 at 21:06;  Status DC


Chlorhexidine Gluconate (Chlorhexidine 2% Cloth) 3 pack ON CALL  PRN TOPICAL 

SEE LABEL COMMENTS;  Start 5/29/18 at 17:45;  Stop 6/1/18 at 17:44;  Status DC


Clindamycin/ Sodium Chloride 50 ml @  100 mls/hr Q8H IV  Last administered on 5/ 19/18at 05:50;  Start 5/19/18 at 06:00;  Stop 5/19/18 at 10:37;  Status DC


Clopidogrel Bisulfate (Plavix) 75 mg DAILY PO  Last administered on 6/7/18at 09:

09;  Start 5/23/18 at 20:00


Lactated Ringer's 1,000 ml @  30 mls/hr Q24H PRN IV SEE LABEL COMMENTS;  Start 5 /29/18 at 17:45;  Stop 6/1/18 at 17:44;  Status DC


Lactulose (Lactulose Liq) 30 ml DAILY  PRN PO SEVERE CONSITIPATION;  Start 5/19/ 18 at 02:00


Magnesium Hydroxide (Milk Of Magnesia Liq) 30 ml Q12H  PRN PO Mild constipation

;  Start 5/19/18 at 02:00


Metoclopramide HCl (Reglan Inj) 5 mg Q6H  PRN IV PUSH NAUSEA OR VOMITING;  

Start 5/19/18 at 02:00


Miscellaneous Information (Misc Nursing Information) ALL NURSING DEPARTME... 

UNSCH  PRN .XX SEE LABEL COMMENTS;  Start 5/29/18 at 22:15;  Stop 5/30/18 at 22:

14;  Status DC


Miscellaneous Information ([facility] Ordered Lab Info) SPECIFIC LAB TO BE 

JUN... ONCE  ONCE .XX ;  Start 5/22/18 at 11:45;  Stop 5/22/18 at 11:45;  

Status DC


Morphine Sulfate (Morphine Inj) 2 mg Q3H  PRN IV PUSH Pain 6-10 Last 

administered on 5/19/18at 12:07;  Start 5/19/18 at 02:15


Pharmacy Profile Note 0 ml @ 0 mls/hr UNSCH OTHER ;  Start 5/19/18 at 10:45;  

Stop 5/20/18 at 19:38;  Status DC


Potassium Chloride (KCl Powder) 40 meq ONCE  ONCE PO  Last administered on 5/27/ 18at 11:45;  Start 5/27/18 at 11:45;  Stop 5/27/18 at 11:46;  Status DC


Povidone Iodine (Betadine 5% Antisepsis Kit) 1 applic ON CALL  PRN EACH NARE 

SEE LABEL COMMENTS;  Start 5/29/18 at 17:45;  Stop 6/1/18 at 17:44;  Status DC


Senna/Docusate Sodium (Yeimi-Colace) 1 tab BID PO ;  Start 5/19/18 at 09:00;  

Status Future Hold


Sennosides (Senokot) 17.2 mg Q12H  PRN PO Moderate constipation;  Start 5/19/18 

at 02:00


Sodium Chloride 500 ml @  30 mls/hr [id number] PRN IV SEE LABEL COMMENTS;  Start 5/ 29/18 at 17:45;  Stop 6/1/18 at 17:44;  Status DC


Sodium Chloride (NS Flush) 2 ml BID IV FLUSH  Last administered on 6/7/18at 09:

10;  Start 5/19/18 at 09:00


Sodium Chloride 1200 ml/Syringe / Bag 1,200 ml @  3,600 mls/hr BOLUS  STAT IV ;

  Start 5/26/18 at 12:19;  Stop 5/26/18 at 12:23;  Status DC


Tamsulosin HCl (Flomax) 0.4 mg DAILY PO  Last administered on 6/7/18at 09:09;  

Start 5/28/18 at 11:00


Vancomycin HCl 1000 mg/Sodium Chloride 250 ml @  250 mls/hr Q24H IV  Last 

administered on 5/20/18at 11:48;  Start 5/19/18 at 12:00;  Stop 5/20/18 at 19:38

;  Status DC





A/P


Problem List:  


(1) Cellulitis of left foot


ICD Code:  L03.116 - Cellulitis of left lower limb


Status:  Acute


(2) Intractable pain


ICD Code:  R52 - Pain, unspecified


Status:  Acute


Assessment and Plan


Sepsis


Resolved 





Left Foot Cellulitis


septic joint vs osteomyelitis


Continue cefazolin for 4 weeks


f/u with podiatry as outpatient.


evaluated by ID.





Peripheral vascular disease


History of femorofemoral bypass


PVD contributory to limb/foot disease


Status post bilateral open and endovascular leg revascularization femorofemoral 

bypass


Continue Plavix


cleared by vascular surgery for discharge.





Hypotension


Resolved





Urinary obstruction 


hematuria- has much improved.


UTI


continue antibiotic


follow the UC.


voiding trial as outpatient- per urology.


Flomax





DVT prophylaxis


SCDs


No chemoprophylaxis due to active bleeding


Discharge Planning


dc planning to SNF within the next 24 hrs.


f/u; pcp, ortho, vascular surgery.


see med list.


d/w the patient and RN.


d/w the case management.





time spent 35 min.











Enmanuel Dutta MD Jun 7, 2018 10:23

## 2018-06-08 VITALS
SYSTOLIC BLOOD PRESSURE: 164 MMHG | DIASTOLIC BLOOD PRESSURE: 74 MMHG | OXYGEN SATURATION: 100 % | HEART RATE: 85 BPM | TEMPERATURE: 97.8 F | RESPIRATION RATE: 16 BRPM

## 2018-06-08 VITALS
SYSTOLIC BLOOD PRESSURE: 120 MMHG | HEART RATE: 80 BPM | DIASTOLIC BLOOD PRESSURE: 62 MMHG | TEMPERATURE: 97.1 F | RESPIRATION RATE: 17 BRPM | OXYGEN SATURATION: 100 %

## 2018-06-08 VITALS
SYSTOLIC BLOOD PRESSURE: 139 MMHG | RESPIRATION RATE: 18 BRPM | HEART RATE: 117 BPM | OXYGEN SATURATION: 100 % | DIASTOLIC BLOOD PRESSURE: 85 MMHG | TEMPERATURE: 98.3 F

## 2018-06-08 VITALS
RESPIRATION RATE: 16 BRPM | SYSTOLIC BLOOD PRESSURE: 120 MMHG | HEART RATE: 62 BPM | DIASTOLIC BLOOD PRESSURE: 65 MMHG | OXYGEN SATURATION: 98 % | TEMPERATURE: 98 F

## 2018-06-08 VITALS
OXYGEN SATURATION: 95 % | HEART RATE: 100 BPM | SYSTOLIC BLOOD PRESSURE: 133 MMHG | TEMPERATURE: 98.2 F | DIASTOLIC BLOOD PRESSURE: 71 MMHG | RESPIRATION RATE: 18 BRPM

## 2018-06-08 VITALS
RESPIRATION RATE: 18 BRPM | DIASTOLIC BLOOD PRESSURE: 82 MMHG | HEART RATE: 104 BPM | SYSTOLIC BLOOD PRESSURE: 115 MMHG | TEMPERATURE: 98.3 F | OXYGEN SATURATION: 97 %

## 2018-06-08 RX ADMIN — CEFAZOLIN SODIUM SCH MLS/HR: 2 SOLUTION INTRAVENOUS at 05:24

## 2018-06-08 RX ADMIN — Medication SCH ML: at 20:32

## 2018-06-08 RX ADMIN — Medication SCH ML: at 09:12

## 2018-06-08 RX ADMIN — CEFAZOLIN SODIUM SCH MLS/HR: 2 SOLUTION INTRAVENOUS at 20:32

## 2018-06-08 RX ADMIN — CEFAZOLIN SODIUM SCH MLS/HR: 2 SOLUTION INTRAVENOUS at 13:57

## 2018-06-08 RX ADMIN — LEVOFLOXACIN SCH MG: 500 TABLET, FILM COATED ORAL at 20:32

## 2018-06-08 RX ADMIN — AZTREONAM SCH MLS/HR: 2 INJECTION, POWDER, LYOPHILIZED, FOR SOLUTION INTRAMUSCULAR; INTRAVENOUS at 14:56

## 2018-06-08 RX ADMIN — AZTREONAM SCH MLS/HR: 2 INJECTION, POWDER, LYOPHILIZED, FOR SOLUTION INTRAMUSCULAR; INTRAVENOUS at 05:25

## 2018-06-08 RX ADMIN — CLOPIDOGREL BISULFATE SCH MG: 75 TABLET, FILM COATED ORAL at 09:12

## 2018-06-08 RX ADMIN — TAMSULOSIN HYDROCHLORIDE SCH MG: 0.4 CAPSULE ORAL at 09:12

## 2018-06-08 NOTE — HHI.PR
Subjective


Remarks


in no acute distress.


denies pain.


no fever.


hematuria has resolved.





Objective


Vitals





Vital Signs








  Date Time  Temp Pulse Resp B/P (MAP) Pulse Ox O2 Delivery O2 Flow Rate FiO2


 


6/8/18 08:00 98.1 80 18 133/71 (91) 100   


 


6/8/18 04:00 97.1 80 17 120/62 (81) 100   


 


6/8/18 00:00 98.0 62 16 120/65 (83) 98   


 


6/7/18 20:00 98.2 82 18 124/66 (85) 100   


 


6/7/18 16:00 98.0 88 18 108/59 (75) 100   


 


6/7/18 12:00 97.9 18 18 129/62 (84) 99   














I/O      


 


 6/7/18 6/7/18 6/7/18 6/8/18 6/8/18 6/8/18





 07:00 15:00 23:00 07:00 15:00 23:00


 


Intake Total    700 ml  


 


Output Total 1100 ml 850 ml  1100 ml  


 


Balance -1100 ml -850 ml  -400 ml  


 


      


 


Intake Oral    700 ml  


 


Output Urine Total 1100 ml 850 ml  1100 ml  


 


# Voids 2     


 


# Bowel Movements  1    








Imaging





Last Impressions








Foot X-Ray 5/29/18 0000 Signed





Impressions: 





 CONCLUSION: 





 Postsurgical changes characteristic of a bunionectomy.





  





 





  





 





  





 





  





 


 


Foot MRI 5/29/18 0000 Signed





Impressions: 





 CONCLUSION: 





 1.  Significant bony edema in and around the first MTP joint including both of 





 the sesamoid bones. I suspect is related to hallux valgus deformity.





  





 





 2.  Linear edema and enhancement along the first metatarsal base and a pattern 





 suggesting a stress fracture





  





 





 3.  No evidence of osteomyelitis





  





 





  





 


 


Lower Extremity Angiography 5/23/18 0000 Signed





Impressions: 





 CONCLUSION: 





 1.  Segmental inflow stenosis in the left common iliac and proximal external il





 iac treated with 8 mm stent and 7 mm balloon angioplasty.





 2.  Short segment occlusion with multiple segments of high-grade stenosis at th





 e adductor hiatus bridging the distal SFA and proximal above-knee popliteal. Th





 is area was successfully treated with LS atherectomy and 5 mm balloon angioplas





 ty as detailed above.





  





 


 


Aorta w/Runoff CTA 5/20/18 0000 Signed





Impressions: 





 Service Date/Time:  Ron, May 20, 2018 17:02 - CONCLUSION:  1. Bladder is 





 severely distended which may reflect bladder outlet obstruction or neurogenic 





 bladder. Patient may benefit from Roman decompression. 2. No significant 

aortic 





 stenosis. 3. Occluded right common iliac origin with reconstitution of the 





 common femoral artery. 4. Moderate long segment stenosis of the left common 





 iliac artery and proximal left external iliac artery. 5. Heavily calcified 





 common femoral bifurcations bilaterally with diffusely diseased profunda. 6. 





 Diffusely diseased bilateral SFA with multiple severe stenoses of the right 

SFA 





 in the mid to distal thigh and short segment occlusion of the left SFA at the 





 adductor canal. 7. Limited two-vessel runoff bilaterally, as above.     

Yvan Sanon MD 








Objective Remarks


GENERAL: This is a well-nourished, well-developed patient, in no apparent 

distress.


CARDIOVASCULAR: Regular rate and regular rhythm without murmurs, gallops, or 

rubs. 


RESPIRATORY: Clear to auscultation. Breath sounds equal bilaterally. No wheezes

, rales, or rhonchi.  


GASTROINTESTINAL: Abdomen soft, non-tender, nondistended. 


Normal, active bowel sounds


MUSCULOSKELETAL: left foot is swollen-seems to be improving.


NEURO:  Alert & Oriented x4 to person, place, time, situation.  Moves all ext x4


Procedures


Left common iliac and external iliac balloon angioplasty and stent,  


left SFA Hawk 1 endarterectomy and balloon angioplasty, left external 


iliac and common femoral endarterectomy patch angioplasty, right 


external iliac and common femoral endarterectomy with patch 


angioplasty and femoral-femoral bypass.


Medications and IVs





Inpatient Medications


Acetaminophen (Tylenol) 650 mg Q6H  PRN PO FEVER/PAIN SCALE 1 TO 2 Last 

administered on 5/25/18at 22:49;  Start 5/19/18 at 02:00


Acetaminophen/ Hydrocodone Bitart (Norco  5-325 Mg) 1 tab Q4H  PRN PO PAIN 

SCALE 3 TO 5 Last administered on 6/7/18at 09:09;  Start 5/19/18 at 02:00


Albumin Human 500 ml @  250 mls/hr NOW IV  Last administered on 5/24/18at 01:39

;  Start 5/24/18 at 01:45;  Stop 5/24/18 at 03:44;  Status DC


Aztreonam 1000 mg/ Sodium Chloride 100 ml @  200 mls/hr Q8H IV  Last 

administered on 6/8/18at 05:25;  Start 6/7/18 at 14:00


Bisacodyl (Dulcolax Supp) 10 mg DAILY  PRN RECTAL SEVERE CONSITIPATION;  Start 5 /19/18 at 02:00


Cefazolin Sodium/ Dextrose 50 ml @  100 mls/hr Q8H IV  Last administered on 6/8/ 18at 05:24;  Start 5/23/18 at 21:00


Cefazolin/Sodium Chloride 100 ml @  200 mls/hr Q8H IV  Last administered on 5/23 /18at 04:34;  Start 5/20/18 at 20:00;  Stop 5/23/18 at 21:06;  Status DC


Chlorhexidine Gluconate (Chlorhexidine 2% Cloth) 3 pack ON CALL  PRN TOPICAL 

SEE LABEL COMMENTS;  Start 5/29/18 at 17:45;  Stop 6/1/18 at 17:44;  Status DC


Clindamycin/ Sodium Chloride 50 ml @  100 mls/hr Q8H IV  Last administered on 5/ 19/18at 05:50;  Start 5/19/18 at 06:00;  Stop 5/19/18 at 10:37;  Status DC


Clopidogrel Bisulfate (Plavix) 75 mg DAILY PO  Last administered on 6/8/18at 09:

12;  Start 5/23/18 at 20:00


Lactated Ringer's 1,000 ml @  30 mls/hr Q24H PRN IV SEE LABEL COMMENTS;  Start 5 /29/18 at 17:45;  Stop 6/1/18 at 17:44;  Status DC


Lactulose (Lactulose Liq) 30 ml DAILY  PRN PO SEVERE CONSITIPATION;  Start 5/19/ 18 at 02:00


Magnesium Hydroxide (Milk Of Magnesia Liq) 30 ml Q12H  PRN PO Mild constipation

;  Start 5/19/18 at 02:00


Metoclopramide HCl (Reglan Inj) 5 mg Q6H  PRN IV PUSH NAUSEA OR VOMITING;  

Start 5/19/18 at 02:00


Miscellaneous Information (Misc Nursing Information) ALL NURSING DEPARTME... 

UNSCH  PRN .XX SEE LABEL COMMENTS;  Start 5/29/18 at 22:15;  Stop 5/30/18 at 22:

14;  Status DC


Miscellaneous Information ([facility] Ordered Lab Info) SPECIFIC LAB TO BE 

JUN... ONCE  ONCE .XX ;  Start 5/22/18 at 11:45;  Stop 5/22/18 at 11:45;  

Status DC


Morphine Sulfate (Morphine Inj) 2 mg Q3H  PRN IV PUSH Pain 6-10 Last 

administered on 5/19/18at 12:07;  Start 5/19/18 at 02:15


Pharmacy Profile Note 0 ml @ 0 mls/hr UNSCH OTHER ;  Start 5/19/18 at 10:45;  

Stop 5/20/18 at 19:38;  Status DC


Potassium Chloride (KCl Powder) 40 meq ONCE  ONCE PO  Last administered on 5/27/ 18at 11:45;  Start 5/27/18 at 11:45;  Stop 5/27/18 at 11:46;  Status DC


Povidone Iodine (Betadine 5% Antisepsis Kit) 1 applic ON CALL  PRN EACH NARE 

SEE LABEL COMMENTS;  Start 5/29/18 at 17:45;  Stop 6/1/18 at 17:44;  Status DC


Senna/Docusate Sodium (Yeimi-Colace) 1 tab BID PO ;  Start 5/19/18 at 09:00;  

Status Future Hold


Sennosides (Senokot) 17.2 mg Q12H  PRN PO Moderate constipation;  Start 5/19/18 

at 02:00


Sodium Chloride 500 ml @  30 mls/hr [id number] PRN IV SEE LABEL COMMENTS;  Start 5/ 29/18 at 17:45;  Stop 6/1/18 at 17:44;  Status DC


Sodium Chloride (NS Flush) 2 ml BID IV FLUSH  Last administered on 6/8/18at 09:

12;  Start 5/19/18 at 09:00


Sodium Chloride 1200 ml/Syringe / Bag 1,200 ml @  3,600 mls/hr BOLUS  STAT IV ;

  Start 5/26/18 at 12:19;  Stop 5/26/18 at 12:23;  Status DC


Tamsulosin HCl (Flomax) 0.4 mg DAILY PO  Last administered on 6/8/18at 09:12;  

Start 5/28/18 at 11:00


Vancomycin HCl 1000 mg/Sodium Chloride 250 ml @  250 mls/hr Q24H IV  Last 

administered on 5/20/18at 11:48;  Start 5/19/18 at 12:00;  Stop 5/20/18 at 19:38

;  Status DC





A/P


Problem List:  


(1) Cellulitis of left foot


ICD Code:  L03.116 - Cellulitis of left lower limb


Status:  Acute


(2) Intractable pain


ICD Code:  R52 - Pain, unspecified


Status:  Acute


Assessment and Plan


Sepsis


Resolved 





Left Foot Cellulitis


septic joint vs osteomyelitis


Continue cefazolin for 4 weeks


f/u with podiatry as outpatient.


evaluated by ID.





Peripheral vascular disease


History of femorofemoral bypass


PVD contributory to limb/foot disease


Status post bilateral open and endovascular leg revascularization femorofemoral 

bypass


Continue Plavix


cleared by vascular surgery for discharge.





Hypotension


Resolved





Urinary obstruction 


hematuria- has much improved.


UTI with pseudomonas


continue antibiotics; Azactam was added to his regimen on 6/7/18.


voiding trial as outpatient- per urology.


Flomax





DVT prophylaxis


SCDs


No chemoprophylaxis due to active bleeding


Discharge Planning


dc planning to SNF - when cleared by ID.


f/u; pcp, ortho, vascular surgery.


see med list.


d/w the patient .


d/w the case management.





time spent 35 min.











Enmanuel Dutta MD Jun 8, 2018 11:50

## 2018-06-08 NOTE — HHI.IDPN
Subjective


Subjective


Remarks


doinh OK


Urine clx grew  PSAE panS


no fever


Antibiotics


cefazoline


azactam


Allergies:  


Coded Allergies:  


     No Known Allergies (Verified  Allergy, Unknown, 3/5/18)





Objective


.





Vital Signs








  Date Time  Temp Pulse Resp B/P (MAP) Pulse Ox O2 Delivery O2 Flow Rate FiO2


 


6/8/18 16:00 98.3 117 18 157/107 (124) 100   





    139/85 (103)    


 


6/8/18 12:00 98.3 104 18 115/82 (93) 97   


 


6/8/18 08:00 98.1 80 18 133/71 (91) 100   


 


6/8/18 04:00 97.1 80 17 120/62 (81) 100   


 


6/8/18 00:00 98.0 62 16 120/65 (83) 98   


 


6/7/18 20:00 98.2 82 18 124/66 (85) 100   








.





Microbiology








 Date/Time


Source Procedure


Growth Status


 


 


 6/6/18 14:20


Urine Catheterized Urine Urine Culture - Preliminary


Pseudomonas Aeruginosa Resulted








Imaging


 


Last Impressions








Foot X-Ray 5/29/18 0000 Signed





Impressions: 





 CONCLUSION: 





 Postsurgical changes characteristic of a bunionectomy.





  





 





  





 





  





 





  





 


 


Foot MRI 5/29/18 0000 Signed





Impressions: 





 CONCLUSION: 





 1.  Significant bony edema in and around the first MTP joint including both of 





 the sesamoid bones. I suspect is related to hallux valgus deformity.





  





 





 2.  Linear edema and enhancement along the first metatarsal base and a pattern 





 suggesting a stress fracture





  





 





 3.  No evidence of osteomyelitis





  





 





  





 


 


Lower Extremity Angiography 5/23/18 0000 Signed





Impressions: 





 CONCLUSION: 





 1.  Segmental inflow stenosis in the left common iliac and proximal external il





 iac treated with 8 mm stent and 7 mm balloon angioplasty.





 2.  Short segment occlusion with multiple segments of high-grade stenosis at th





 e adductor hiatus bridging the distal SFA and proximal above-knee popliteal. Th





 is area was successfully treated with LS atherectomy and 5 mm balloon angioplas





 ty as detailed above.





  





 


 


Aorta w/Runoff CTA 5/20/18 0000 Signed





Impressions: 





 Service Date/Time:  Ron, May 20, 2018 17:02 - CONCLUSION:  1. Bladder is 





 severely distended which may reflect bladder outlet obstruction or neurogenic 





 bladder. Patient may benefit from Chavez decompression. 2. No significant 

aortic 





 stenosis. 3. Occluded right common iliac origin with reconstitution of the 





 common femoral artery. 4. Moderate long segment stenosis of the left common 





 iliac artery and proximal left external iliac artery. 5. Heavily calcified 





 common femoral bifurcations bilaterally with diffusely diseased profunda. 6. 





 Diffusely diseased bilateral SFA with multiple severe stenoses of the right 

SFA 





 in the mid to distal thigh and short segment occlusion of the left SFA at the 





 adductor canal. 7. Limited two-vessel runoff bilaterally, as above.     

Yvan Sanon MD 








Physical Exam


CONSTITUTIONAL/GENERAL: This is a thin elderly male  patient, in no apparent 

distress.


TUBES/LINES/DRAINS:


SKIN: No jaundice, rashes, or lesions. Ecchymoses on upper extremities. No 

wounds seen anteriorly. Skin temperature appropriate. Not diaphoretic. 


 CARDIOVASCULAR: Regular rate and rhythm without murmurs, gallops, or rubs. No 

JVD. Peripheral pulses symmetric.


RESPIRATORY/CHEST: Symmetric, unlabored respirations. Clear to auscultation. 

Breath sounds equal bilaterally. No wheezes, rales, or rhonchi.  


GASTROINTESTINAL: Abdomen soft, non-tender, nondistended. No hepato-splenomegaly

, or palpable masses. No guarding. Bowel sounds present.


GENITOURINARY: Without palpable bladder distension.  chavez in place with clear  

yellow urine


MUSCULOSKELETAL: Extremities without clubbing, cyanosis, 


 Lt foot  dressing in place


 NEUROLOGICAL: Awake and alert. Baseline L hemiparesis . Follows commands. 

Confused. Moves all extremities.


PSYCHIATRIC: No obvious anxiety/depression. no apparent hallucinations or other 

psychotic thought process.





Assessment & Plan


Remarks


MSSA infected 1st MT wound , left


   - osteo - confirmed on path


   - clx neg can be 2/2 prior abx use


MSSA sepsis - 2/2 foot infection


    2 D echo w/o vegetations


Underlying moderate to severe PVD


 Bladder outlet obstruction - chavez placed 


   - UA/C+S not cw inf


  viridans strep bacteremia ? significance


    - vir strep is common skin contaminant and in low grade bacteremia likely 

contaminastion


More pain, purulence of Lt 1 MT 


    sp I+D


    clx P


abx associated diarrhea, c.diff negative 


PSAE UTI - pan S





   cont  cefazolin


         complete 6 weeks of abx 


    PO levaquin 500 mg  x 14 days for UTI





dw Dr Madsen


OK to dc pt











Charu Stanley MD Jun 8, 2018 18:32

## 2018-06-09 VITALS
TEMPERATURE: 98.4 F | RESPIRATION RATE: 18 BRPM | OXYGEN SATURATION: 100 % | HEART RATE: 82 BPM | SYSTOLIC BLOOD PRESSURE: 132 MMHG | DIASTOLIC BLOOD PRESSURE: 62 MMHG

## 2018-06-09 VITALS
HEART RATE: 79 BPM | SYSTOLIC BLOOD PRESSURE: 128 MMHG | OXYGEN SATURATION: 100 % | RESPIRATION RATE: 16 BRPM | DIASTOLIC BLOOD PRESSURE: 64 MMHG | TEMPERATURE: 98.2 F

## 2018-06-09 VITALS
DIASTOLIC BLOOD PRESSURE: 60 MMHG | SYSTOLIC BLOOD PRESSURE: 105 MMHG | OXYGEN SATURATION: 99 % | RESPIRATION RATE: 18 BRPM | HEART RATE: 101 BPM | TEMPERATURE: 97.7 F

## 2018-06-09 VITALS
TEMPERATURE: 98.6 F | HEART RATE: 103 BPM | RESPIRATION RATE: 18 BRPM | SYSTOLIC BLOOD PRESSURE: 135 MMHG | OXYGEN SATURATION: 100 % | DIASTOLIC BLOOD PRESSURE: 65 MMHG

## 2018-06-09 RX ADMIN — CEFAZOLIN SODIUM SCH MLS/HR: 2 SOLUTION INTRAVENOUS at 13:07

## 2018-06-09 RX ADMIN — Medication SCH ML: at 08:45

## 2018-06-09 RX ADMIN — TAMSULOSIN HYDROCHLORIDE SCH MG: 0.4 CAPSULE ORAL at 08:44

## 2018-06-09 RX ADMIN — LEVOFLOXACIN SCH MG: 500 TABLET, FILM COATED ORAL at 08:44

## 2018-06-09 RX ADMIN — CEFAZOLIN SODIUM SCH MLS/HR: 2 SOLUTION INTRAVENOUS at 05:34

## 2018-06-09 RX ADMIN — CLOPIDOGREL BISULFATE SCH MG: 75 TABLET, FILM COATED ORAL at 08:44

## 2018-06-09 NOTE — HHI.PR
Subjective


Remarks


in no acute distress.


looks comfortable.


no fever.


no new complaints.





Objective


Vitals





Vital Signs








  Date Time  Temp Pulse Resp B/P (MAP) Pulse Ox O2 Delivery O2 Flow Rate FiO2


 


6/9/18 07:58 98.6 103 18 135/65 (88) 100   


 


6/9/18 04:00 98.2 79 16 128/64 (85) 100   


 


6/9/18 00:00 98.4 82 18 132/62 (85) 100   


 


6/8/18 20:00 97.8 85 16 164/74 (104) 100   


 


6/8/18 16:00 98.3 117 18 157/107 (124) 100   





    139/85 (103)    


 


6/8/18 12:00 98.3 104 18 115/82 (93) 97   














I/O      


 


 6/8/18 6/8/18 6/8/18 6/9/18 6/9/18 6/9/18





 07:00 15:00 23:00 07:00 15:00 23:00


 


Intake Total 700 ml  50 ml 52 ml  


 


Output Total 1100 ml   650 ml  


 


Balance -400 ml  50 ml -598 ml  


 


      


 


Intake Oral 700 ml     


 


IV Total   50 ml 52 ml  


 


Output Urine Total 1100 ml   650 ml  








Imaging





Last Impressions








Foot X-Ray 5/29/18 0000 Signed





Impressions: 





 CONCLUSION: 





 Postsurgical changes characteristic of a bunionectomy.





  





 





  





 





  





 





  





 


 


Foot MRI 5/29/18 0000 Signed





Impressions: 





 CONCLUSION: 





 1.  Significant bony edema in and around the first MTP joint including both of 





 the sesamoid bones. I suspect is related to hallux valgus deformity.





  





 





 2.  Linear edema and enhancement along the first metatarsal base and a pattern 





 suggesting a stress fracture





  





 





 3.  No evidence of osteomyelitis





  





 





  





 


 


Lower Extremity Angiography 5/23/18 0000 Signed





Impressions: 





 CONCLUSION: 





 1.  Segmental inflow stenosis in the left common iliac and proximal external il





 iac treated with 8 mm stent and 7 mm balloon angioplasty.





 2.  Short segment occlusion with multiple segments of high-grade stenosis at th





 e adductor hiatus bridging the distal SFA and proximal above-knee popliteal. Th





 is area was successfully treated with LS atherectomy and 5 mm balloon angioplas





 ty as detailed above.





  





 


 


Aorta w/Runoff CTA 5/20/18 0000 Signed





Impressions: 





 Service Date/Time:  Ron, May 20, 2018 17:02 - CONCLUSION:  1. Bladder is 





 severely distended which may reflect bladder outlet obstruction or neurogenic 





 bladder. Patient may benefit from Roman decompression. 2. No significant 

aortic 





 stenosis. 3. Occluded right common iliac origin with reconstitution of the 





 common femoral artery. 4. Moderate long segment stenosis of the left common 





 iliac artery and proximal left external iliac artery. 5. Heavily calcified 





 common femoral bifurcations bilaterally with diffusely diseased profunda. 6. 





 Diffusely diseased bilateral SFA with multiple severe stenoses of the right 

SFA 





 in the mid to distal thigh and short segment occlusion of the left SFA at the 





 adductor canal. 7. Limited two-vessel runoff bilaterally, as above.     

Yvan Snaon MD 








Objective Remarks


GENERAL: This is a well-nourished, well-developed patient, in no apparent 

distress.


CARDIOVASCULAR: Regular rate and regular rhythm without murmurs, gallops, or 

rubs. 


RESPIRATORY: Clear to auscultation. Breath sounds equal bilaterally. No wheezes

, rales, or rhonchi.  


GASTROINTESTINAL: Abdomen soft, non-tender, nondistended. 


Normal, active bowel sounds


MUSCULOSKELETAL: left foot is swollen-seems to be improving.


NEURO:  Alert & Oriented x4 to person, place, time, situation.  Moves all ext x4


Procedures


Left common iliac and external iliac balloon angioplasty and stent,  


left SFA Hawk 1 endarterectomy and balloon angioplasty, left external 


iliac and common femoral endarterectomy patch angioplasty, right 


external iliac and common femoral endarterectomy with patch 


angioplasty and femoral-femoral bypass.


Medications and IVs





Inpatient Medications


Acetaminophen (Tylenol) 650 mg Q6H  PRN PO FEVER/PAIN SCALE 1 TO 2 Last 

administered on 5/25/18at 22:49;  Start 5/19/18 at 02:00


Acetaminophen/ Hydrocodone Bitart (Norco  5-325 Mg) 1 tab Q4H  PRN PO PAIN 

SCALE 3 TO 5 Last administered on 6/7/18at 09:09;  Start 5/19/18 at 02:00


Albumin Human 500 ml @  250 mls/hr NOW IV  Last administered on 5/24/18at 01:39

;  Start 5/24/18 at 01:45;  Stop 5/24/18 at 03:44;  Status DC


Aztreonam 1000 mg/ Sodium Chloride 100 ml @  200 mls/hr Q8H IV  Last 

administered on 6/8/18at 14:56;  Start 6/7/18 at 14:00;  Stop 6/8/18 at 18:31;  

Status DC


Bisacodyl (Dulcolax Supp) 10 mg DAILY  PRN RECTAL SEVERE CONSITIPATION;  Start 5 /19/18 at 02:00


Cefazolin Sodium/ Dextrose 50 ml @  100 mls/hr Q8H IV  Last administered on 6/9/ 18at 05:34;  Start 5/23/18 at 21:00


Cefazolin/Sodium Chloride 100 ml @  200 mls/hr Q8H IV  Last administered on 5/23 /18at 04:34;  Start 5/20/18 at 20:00;  Stop 5/23/18 at 21:06;  Status DC


Chlorhexidine Gluconate (Chlorhexidine 2% Cloth) 3 pack ON CALL  PRN TOPICAL 

SEE LABEL COMMENTS;  Start 5/29/18 at 17:45;  Stop 6/1/18 at 17:44;  Status DC


Clindamycin/ Sodium Chloride 50 ml @  100 mls/hr Q8H IV  Last administered on 5/ 19/18at 05:50;  Start 5/19/18 at 06:00;  Stop 5/19/18 at 10:37;  Status DC


Clopidogrel Bisulfate (Plavix) 75 mg DAILY PO  Last administered on 6/9/18at 08:

44;  Start 5/23/18 at 20:00


Lactated Ringer's 1,000 ml @  30 mls/hr Q24H PRN IV SEE LABEL COMMENTS;  Start 5 /29/18 at 17:45;  Stop 6/1/18 at 17:44;  Status DC


Lactulose (Lactulose Liq) 30 ml DAILY  PRN PO SEVERE CONSITIPATION;  Start 5/19/ 18 at 02:00


Levofloxacin (Levaquin) 500 mg DAILY PO  Last administered on 6/9/18at 08:44;  

Start 6/8/18 at 20:00


Magnesium Hydroxide (Milk Of Magnesia Liq) 30 ml Q12H  PRN PO Mild constipation

;  Start 5/19/18 at 02:00


Metoclopramide HCl (Reglan Inj) 5 mg Q6H  PRN IV PUSH NAUSEA OR VOMITING;  

Start 5/19/18 at 02:00


Miscellaneous Information (Misc Nursing Information) ALL NURSING DEPARTME... 

UNSCH  PRN .XX SEE LABEL COMMENTS;  Start 5/29/18 at 22:15;  Stop 5/30/18 at 22:

14;  Status DC


Miscellaneous Information ([facility] Ordered Lab Info) SPECIFIC LAB TO BE 

.. ONCE  ONCE .XX ;  Start 5/22/18 at 11:45;  Stop 5/22/18 at 11:45;  

Status DC


Morphine Sulfate (Morphine Inj) 2 mg Q3H  PRN IV PUSH Pain 6-10 Last 

administered on 5/19/18at 12:07;  Start 5/19/18 at 02:15


Pharmacy Profile Note 0 ml @ 0 mls/hr UNSCH OTHER ;  Start 5/19/18 at 10:45;  

Stop 5/20/18 at 19:38;  Status DC


Potassium Chloride (KCl Powder) 40 meq ONCE  ONCE PO  Last administered on 5/27/ 18at 11:45;  Start 5/27/18 at 11:45;  Stop 5/27/18 at 11:46;  Status DC


Povidone Iodine (Betadine 5% Antisepsis Kit) 1 applic ON CALL  PRN EACH NARE 

SEE LABEL COMMENTS;  Start 5/29/18 at 17:45;  Stop 6/1/18 at 17:44;  Status DC


Senna/Docusate Sodium (Yeimi-Colace) 1 tab BID PO ;  Start 5/19/18 at 09:00;  

Status Future Hold


Sennosides (Senokot) 17.2 mg Q12H  PRN PO Moderate constipation;  Start 5/19/18 

at 02:00


Sodium Chloride 500 ml @  30 mls/hr [id number] PRN IV SEE LABEL COMMENTS;  Start 5/ 29/18 at 17:45;  Stop 6/1/18 at 17:44;  Status DC


Sodium Chloride (NS Flush) 2 ml BID IV FLUSH  Last administered on 6/9/18at 08:

45;  Start 5/19/18 at 09:00


Sodium Chloride 1200 ml/Syringe / Bag 1,200 ml @  3,600 mls/hr BOLUS  STAT IV ;

  Start 5/26/18 at 12:19;  Stop 5/26/18 at 12:23;  Status DC


Tamsulosin HCl (Flomax) 0.4 mg DAILY PO  Last administered on 6/9/18at 08:44;  

Start 5/28/18 at 11:00


Vancomycin HCl 1000 mg/Sodium Chloride 250 ml @  250 mls/hr Q24H IV  Last 

administered on 5/20/18at 11:48;  Start 5/19/18 at 12:00;  Stop 5/20/18 at 19:38

;  Status DC





A/P


Problem List:  


(1) Cellulitis of left foot


ICD Code:  L03.116 - Cellulitis of left lower limb


Status:  Acute


(2) Intractable pain


ICD Code:  R52 - Pain, unspecified


Status:  Acute


Assessment and Plan


Sepsis


Resolved 





Left Foot Cellulitis


septic joint vs osteomyelitis


Continue cefazolin for 4 weeks


f/u with podiatry as outpatient.


evaluated by ID.





Peripheral vascular disease


History of femorofemoral bypass


PVD contributory to limb/foot disease


Status post bilateral open and endovascular leg revascularization femorofemoral 

bypass


Continue Plavix


cleared by vascular surgery for discharge.





Hypotension


Resolved





Urinary obstruction 


hematuria- has much improved.


UTI with pseudomonas


continue antibiotics; Azactam was dc'ed and started on PO Levaquin ( 14 days of 

treatment) per ID.


voiding trial as outpatient- per urology.


Flomax





DVT prophylaxis


SCDs


No chemoprophylaxis due to active bleeding


Discharge Planning


dc planning to SNF - when arrangements made.


might need to wait till Monday - for authorization.


f/u; pcp, podiatry, vascular surgery.


see med list.


d/w the patient .


previously d/w the case management.





time spent 35 min.











Enmanuel Dutta MD Jun 9, 2018 10:53